# Patient Record
Sex: FEMALE | Race: WHITE | Employment: OTHER | URBAN - METROPOLITAN AREA
[De-identification: names, ages, dates, MRNs, and addresses within clinical notes are randomized per-mention and may not be internally consistent; named-entity substitution may affect disease eponyms.]

---

## 2017-09-07 ENCOUNTER — HOSPITAL ENCOUNTER (OUTPATIENT)
Dept: PREADMISSION TESTING | Age: 82
Discharge: HOME | End: 2017-09-07
Payer: MEDICARE

## 2017-09-07 VITALS
TEMPERATURE: 97.8 F | HEIGHT: 63 IN | WEIGHT: 157.6 LBS | RESPIRATION RATE: 16 BRPM | HEART RATE: 85 BPM | DIASTOLIC BLOOD PRESSURE: 96 MMHG | SYSTOLIC BLOOD PRESSURE: 176 MMHG | OXYGEN SATURATION: 94 % | BODY MASS INDEX: 27.93 KG/M2

## 2017-09-07 DIAGNOSIS — M17.11 PRIMARY OSTEOARTHRITIS OF RIGHT KNEE: Chronic | ICD-10-CM

## 2017-09-07 DIAGNOSIS — Z96.651 STATUS POST TOTAL KNEE REPLACEMENT, RIGHT: Primary | ICD-10-CM

## 2017-09-07 DIAGNOSIS — H35.30 MACULAR DEGENERATION: Chronic | ICD-10-CM

## 2017-09-07 DIAGNOSIS — I10 ESSENTIAL HYPERTENSION: Chronic | ICD-10-CM

## 2017-09-07 PROBLEM — E78.5 HYPERLIPIDEMIA: Status: ACTIVE | Noted: 2017-09-07

## 2017-09-07 LAB
ABO/RH: NORMAL
ANION GAP SERPL CALCULATED.3IONS-SCNC: 20 MEQ/L (ref 7–13)
ANTIBODY SCREEN: NORMAL
BACTERIA: ABNORMAL /HPF
BILIRUBIN URINE: NEGATIVE
BLOOD, URINE: ABNORMAL
BUN BLDV-MCNC: 24 MG/DL (ref 8–23)
CALCIUM SERPL-MCNC: 9.8 MG/DL (ref 8.6–10.2)
CHLORIDE BLD-SCNC: 97 MEQ/L (ref 98–107)
CLARITY: CLEAR
CO2: 25 MEQ/L (ref 22–29)
COLOR: YELLOW
CREAT SERPL-MCNC: 0.55 MG/DL (ref 0.5–0.9)
EPITHELIAL CELLS, UA: ABNORMAL /HPF
GFR AFRICAN AMERICAN: >60
GFR NON-AFRICAN AMERICAN: >60
GLUCOSE BLD-MCNC: 142 MG/DL (ref 74–109)
GLUCOSE URINE: NEGATIVE MG/DL
HCT VFR BLD CALC: 46.7 % (ref 37–47)
HEMOGLOBIN: 16 G/DL (ref 12–16)
KETONES, URINE: NEGATIVE MG/DL
LEUKOCYTE ESTERASE, URINE: ABNORMAL
MCH RBC QN AUTO: 29.8 PG (ref 27–31.3)
MCHC RBC AUTO-ENTMCNC: 34.3 % (ref 33–37)
MCV RBC AUTO: 86.7 FL (ref 82–100)
NITRITE, URINE: NEGATIVE
PDW BLD-RTO: 13.5 % (ref 11.5–14.5)
PH UA: 6.5 (ref 5–9)
PLATELET # BLD: 231 K/UL (ref 130–400)
POTASSIUM SERPL-SCNC: 4 MEQ/L (ref 3.5–5.1)
PROTEIN UA: NEGATIVE MG/DL
RBC # BLD: 5.39 M/UL (ref 4.2–5.4)
RBC UA: ABNORMAL /HPF (ref 0–2)
SODIUM BLD-SCNC: 142 MEQ/L (ref 132–144)
SPECIFIC GRAVITY UA: 1.02 (ref 1–1.03)
URINE REFLEX TO CULTURE: YES
UROBILINOGEN, URINE: 0.2 E.U./DL
WBC # BLD: 8.6 K/UL (ref 4.8–10.8)
WBC UA: ABNORMAL /HPF (ref 0–5)
YEAST: PRESENT

## 2017-09-07 PROCEDURE — 86850 RBC ANTIBODY SCREEN: CPT

## 2017-09-07 PROCEDURE — 80048 BASIC METABOLIC PNL TOTAL CA: CPT

## 2017-09-07 PROCEDURE — 86900 BLOOD TYPING SEROLOGIC ABO: CPT

## 2017-09-07 PROCEDURE — 81001 URINALYSIS AUTO W/SCOPE: CPT

## 2017-09-07 PROCEDURE — 85027 COMPLETE CBC AUTOMATED: CPT

## 2017-09-07 PROCEDURE — 87086 URINE CULTURE/COLONY COUNT: CPT

## 2017-09-07 PROCEDURE — 86901 BLOOD TYPING SEROLOGIC RH(D): CPT

## 2017-09-07 RX ORDER — SAW/VIT E/SOD SEL/LYC/BETA/PYG 160-100
TABLET ORAL
COMMUNITY

## 2017-09-07 RX ORDER — SODIUM CHLORIDE 0.9 % (FLUSH) 0.9 %
10 SYRINGE (ML) INJECTION EVERY 12 HOURS SCHEDULED
Status: CANCELLED | OUTPATIENT
Start: 2017-09-07

## 2017-09-07 RX ORDER — SODIUM CHLORIDE, SODIUM LACTATE, POTASSIUM CHLORIDE, CALCIUM CHLORIDE 600; 310; 30; 20 MG/100ML; MG/100ML; MG/100ML; MG/100ML
INJECTION, SOLUTION INTRAVENOUS CONTINUOUS
Status: CANCELLED | OUTPATIENT
Start: 2017-09-07

## 2017-09-07 RX ORDER — SODIUM CHLORIDE 0.9 % (FLUSH) 0.9 %
10 SYRINGE (ML) INJECTION PRN
Status: CANCELLED | OUTPATIENT
Start: 2017-09-07

## 2017-09-07 RX ORDER — LIDOCAINE HYDROCHLORIDE 10 MG/ML
1 INJECTION, SOLUTION EPIDURAL; INFILTRATION; INTRACAUDAL; PERINEURAL
Status: CANCELLED | OUTPATIENT
Start: 2017-09-07 | End: 2017-09-07

## 2017-09-09 LAB — URINE CULTURE, ROUTINE: NORMAL

## 2017-09-14 ENCOUNTER — ANESTHESIA EVENT (OUTPATIENT)
Dept: OPERATING ROOM | Age: 82
DRG: 470 | End: 2017-09-14
Payer: MEDICARE

## 2017-09-15 ENCOUNTER — APPOINTMENT (OUTPATIENT)
Dept: GENERAL RADIOLOGY | Age: 82
DRG: 470 | End: 2017-09-15
Attending: ORTHOPAEDIC SURGERY
Payer: MEDICARE

## 2017-09-15 ENCOUNTER — HOSPITAL ENCOUNTER (INPATIENT)
Age: 82
LOS: 4 days | Discharge: HOME HEALTH | DRG: 470 | End: 2017-09-19
Attending: ORTHOPAEDIC SURGERY | Admitting: ORTHOPAEDIC SURGERY
Payer: MEDICARE

## 2017-09-15 ENCOUNTER — ANESTHESIA (OUTPATIENT)
Dept: OPERATING ROOM | Age: 82
DRG: 470 | End: 2017-09-15
Payer: MEDICARE

## 2017-09-15 VITALS — OXYGEN SATURATION: 97 % | DIASTOLIC BLOOD PRESSURE: 56 MMHG | TEMPERATURE: 95.9 F | SYSTOLIC BLOOD PRESSURE: 104 MMHG

## 2017-09-15 DIAGNOSIS — Z96.651 STATUS POST TOTAL KNEE REPLACEMENT, RIGHT: Primary | ICD-10-CM

## 2017-09-15 DIAGNOSIS — M51.37 DEGENERATION OF INTERVERTEBRAL DISC OF LUMBOSACRAL REGION: ICD-10-CM

## 2017-09-15 DIAGNOSIS — M17.11 PRIMARY OSTEOARTHRITIS OF RIGHT KNEE: ICD-10-CM

## 2017-09-15 PROCEDURE — 2500000003 HC RX 250 WO HCPCS: Performed by: ORTHOPAEDIC SURGERY

## 2017-09-15 PROCEDURE — 2580000003 HC RX 258: Performed by: ORTHOPAEDIC SURGERY

## 2017-09-15 PROCEDURE — C1729 CATH, DRAINAGE: HCPCS | Performed by: ORTHOPAEDIC SURGERY

## 2017-09-15 PROCEDURE — 6360000002 HC RX W HCPCS: Performed by: ORTHOPAEDIC SURGERY

## 2017-09-15 PROCEDURE — 2500000003 HC RX 250 WO HCPCS: Performed by: NURSE ANESTHETIST, CERTIFIED REGISTERED

## 2017-09-15 PROCEDURE — 3700000001 HC ADD 15 MINUTES (ANESTHESIA): Performed by: ORTHOPAEDIC SURGERY

## 2017-09-15 PROCEDURE — 6370000000 HC RX 637 (ALT 250 FOR IP): Performed by: INTERNAL MEDICINE

## 2017-09-15 PROCEDURE — 1210000000 HC MED SURG R&B

## 2017-09-15 PROCEDURE — 3600000014 HC SURGERY LEVEL 4 ADDTL 15MIN: Performed by: ORTHOPAEDIC SURGERY

## 2017-09-15 PROCEDURE — 6360000002 HC RX W HCPCS: Performed by: NURSE ANESTHETIST, CERTIFIED REGISTERED

## 2017-09-15 PROCEDURE — 3600000004 HC SURGERY LEVEL 4 BASE: Performed by: ORTHOPAEDIC SURGERY

## 2017-09-15 PROCEDURE — 3700000000 HC ANESTHESIA ATTENDED CARE: Performed by: ORTHOPAEDIC SURGERY

## 2017-09-15 PROCEDURE — 6360000002 HC RX W HCPCS: Performed by: ANESTHESIOLOGY

## 2017-09-15 PROCEDURE — 7100000000 HC PACU RECOVERY - FIRST 15 MIN: Performed by: ORTHOPAEDIC SURGERY

## 2017-09-15 PROCEDURE — 2580000003 HC RX 258: Performed by: STUDENT IN AN ORGANIZED HEALTH CARE EDUCATION/TRAINING PROGRAM

## 2017-09-15 PROCEDURE — 7100000001 HC PACU RECOVERY - ADDTL 15 MIN: Performed by: ORTHOPAEDIC SURGERY

## 2017-09-15 PROCEDURE — G8988 SELF CARE GOAL STATUS: HCPCS

## 2017-09-15 PROCEDURE — 97166 OT EVAL MOD COMPLEX 45 MIN: CPT

## 2017-09-15 PROCEDURE — G8979 MOBILITY GOAL STATUS: HCPCS

## 2017-09-15 PROCEDURE — 6370000000 HC RX 637 (ALT 250 FOR IP): Performed by: ORTHOPAEDIC SURGERY

## 2017-09-15 PROCEDURE — C1713 ANCHOR/SCREW BN/BN,TIS/BN: HCPCS | Performed by: ORTHOPAEDIC SURGERY

## 2017-09-15 PROCEDURE — 2500000003 HC RX 250 WO HCPCS: Performed by: STUDENT IN AN ORGANIZED HEALTH CARE EDUCATION/TRAINING PROGRAM

## 2017-09-15 PROCEDURE — G8987 SELF CARE CURRENT STATUS: HCPCS

## 2017-09-15 PROCEDURE — A6199 ALGINATE DRSG WOUND FILLER: HCPCS | Performed by: ORTHOPAEDIC SURGERY

## 2017-09-15 PROCEDURE — 97162 PT EVAL MOD COMPLEX 30 MIN: CPT

## 2017-09-15 PROCEDURE — C1776 JOINT DEVICE (IMPLANTABLE): HCPCS | Performed by: ORTHOPAEDIC SURGERY

## 2017-09-15 PROCEDURE — 73560 X-RAY EXAM OF KNEE 1 OR 2: CPT

## 2017-09-15 PROCEDURE — 64447 NJX AA&/STRD FEMORAL NRV IMG: CPT | Performed by: ANESTHESIOLOGY

## 2017-09-15 PROCEDURE — G8978 MOBILITY CURRENT STATUS: HCPCS

## 2017-09-15 DEVICE — COMPONENT FEM SZ 6 STD R KNEE CO CHROM CEM POST STBL COR: Type: IMPLANTABLE DEVICE | Site: KNEE | Status: FUNCTIONAL

## 2017-09-15 DEVICE — COMPONENT PAT DIA32MM THK8.5MM KNEE POLY CEM CONVENTIONAL: Type: IMPLANTABLE DEVICE | Site: KNEE | Status: FUNCTIONAL

## 2017-09-15 DEVICE — COMPONENT ARTC SURF PS 6-9 EF 12 MM RT TIB KNEE POLYETH: Type: IMPLANTABLE DEVICE | Site: KNEE | Status: FUNCTIONAL

## 2017-09-15 DEVICE — PSN TIB STM 5 DEG SZ E R: Type: IMPLANTABLE DEVICE | Site: KNEE | Status: FUNCTIONAL

## 2017-09-15 DEVICE — CEMENT BNE 20ML 40GM ACRYL RESIN HI VISC RADPQ FAST SET: Type: IMPLANTABLE DEVICE | Site: KNEE | Status: FUNCTIONAL

## 2017-09-15 RX ORDER — OXYCODONE HYDROCHLORIDE AND ACETAMINOPHEN 5; 325 MG/1; MG/1
1 TABLET ORAL EVERY 4 HOURS PRN
Status: DISCONTINUED | OUTPATIENT
Start: 2017-09-15 | End: 2017-09-16

## 2017-09-15 RX ORDER — AMLODIPINE BESYLATE 5 MG/1
5 TABLET ORAL DAILY
Status: DISCONTINUED | OUTPATIENT
Start: 2017-09-16 | End: 2017-09-19 | Stop reason: HOSPADM

## 2017-09-15 RX ORDER — METOPROLOL SUCCINATE 50 MG/1
50 TABLET, EXTENDED RELEASE ORAL DAILY
Status: DISCONTINUED | OUTPATIENT
Start: 2017-09-15 | End: 2017-09-19 | Stop reason: HOSPADM

## 2017-09-15 RX ORDER — FENTANYL CITRATE 50 UG/ML
INJECTION, SOLUTION INTRAMUSCULAR; INTRAVENOUS
Status: DISCONTINUED
Start: 2017-09-15 | End: 2017-09-15

## 2017-09-15 RX ORDER — ONDANSETRON 2 MG/ML
INJECTION INTRAMUSCULAR; INTRAVENOUS PRN
Status: DISCONTINUED | OUTPATIENT
Start: 2017-09-15 | End: 2017-09-15 | Stop reason: SDUPTHER

## 2017-09-15 RX ORDER — OXYCODONE HYDROCHLORIDE AND ACETAMINOPHEN 5; 325 MG/1; MG/1
2 TABLET ORAL EVERY 4 HOURS PRN
Status: DISCONTINUED | OUTPATIENT
Start: 2017-09-15 | End: 2017-09-16

## 2017-09-15 RX ORDER — ACETAMINOPHEN 325 MG/1
325 TABLET ORAL EVERY 4 HOURS PRN
Status: DISCONTINUED | OUTPATIENT
Start: 2017-09-15 | End: 2017-09-15 | Stop reason: HOSPADM

## 2017-09-15 RX ORDER — PRAVASTATIN SODIUM 40 MG
40 TABLET ORAL NIGHTLY
Status: DISCONTINUED | OUTPATIENT
Start: 2017-09-15 | End: 2017-09-19 | Stop reason: HOSPADM

## 2017-09-15 RX ORDER — SODIUM CHLORIDE 0.9 % (FLUSH) 0.9 %
10 SYRINGE (ML) INJECTION EVERY 12 HOURS SCHEDULED
Status: DISCONTINUED | OUTPATIENT
Start: 2017-09-15 | End: 2017-09-15 | Stop reason: HOSPADM

## 2017-09-15 RX ORDER — BISACODYL 10 MG
10 SUPPOSITORY, RECTAL RECTAL DAILY PRN
Status: DISCONTINUED | OUTPATIENT
Start: 2017-09-15 | End: 2017-09-19 | Stop reason: HOSPADM

## 2017-09-15 RX ORDER — ROPIVACAINE HYDROCHLORIDE 5 MG/ML
INJECTION, SOLUTION EPIDURAL; INFILTRATION; PERINEURAL PRN
Status: DISCONTINUED | OUTPATIENT
Start: 2017-09-15 | End: 2017-09-15 | Stop reason: SDUPTHER

## 2017-09-15 RX ORDER — MIDAZOLAM HYDROCHLORIDE 1 MG/ML
INJECTION INTRAMUSCULAR; INTRAVENOUS PRN
Status: DISCONTINUED | OUTPATIENT
Start: 2017-09-15 | End: 2017-09-15 | Stop reason: SDUPTHER

## 2017-09-15 RX ORDER — SODIUM CHLORIDE, SODIUM LACTATE, POTASSIUM CHLORIDE, CALCIUM CHLORIDE 600; 310; 30; 20 MG/100ML; MG/100ML; MG/100ML; MG/100ML
INJECTION, SOLUTION INTRAVENOUS CONTINUOUS
Status: DISCONTINUED | OUTPATIENT
Start: 2017-09-15 | End: 2017-09-15

## 2017-09-15 RX ORDER — HYDROCODONE BITARTRATE AND ACETAMINOPHEN 5; 325 MG/1; MG/1
1 TABLET ORAL PRN
Status: DISCONTINUED | OUTPATIENT
Start: 2017-09-15 | End: 2017-09-15 | Stop reason: HOSPADM

## 2017-09-15 RX ORDER — GLYCOPYRROLATE 0.2 MG/ML
INJECTION INTRAMUSCULAR; INTRAVENOUS PRN
Status: DISCONTINUED | OUTPATIENT
Start: 2017-09-15 | End: 2017-09-15 | Stop reason: SDUPTHER

## 2017-09-15 RX ORDER — MORPHINE SULFATE 4 MG/ML
4 INJECTION, SOLUTION INTRAMUSCULAR; INTRAVENOUS
Status: DISCONTINUED | OUTPATIENT
Start: 2017-09-15 | End: 2017-09-16

## 2017-09-15 RX ORDER — SODIUM CHLORIDE 0.9 % (FLUSH) 0.9 %
10 SYRINGE (ML) INJECTION EVERY 12 HOURS SCHEDULED
Status: DISCONTINUED | OUTPATIENT
Start: 2017-09-15 | End: 2017-09-19 | Stop reason: HOSPADM

## 2017-09-15 RX ORDER — LIDOCAINE HYDROCHLORIDE 10 MG/ML
1 INJECTION, SOLUTION EPIDURAL; INFILTRATION; INTRACAUDAL; PERINEURAL
Status: COMPLETED | OUTPATIENT
Start: 2017-09-15 | End: 2017-09-15

## 2017-09-15 RX ORDER — MIDAZOLAM HYDROCHLORIDE 1 MG/ML
INJECTION INTRAMUSCULAR; INTRAVENOUS
Status: DISCONTINUED
Start: 2017-09-15 | End: 2017-09-15

## 2017-09-15 RX ORDER — KETOROLAC TROMETHAMINE 15 MG/ML
15 INJECTION, SOLUTION INTRAMUSCULAR; INTRAVENOUS EVERY 6 HOURS
Status: DISCONTINUED | OUTPATIENT
Start: 2017-09-15 | End: 2017-09-15 | Stop reason: HOSPADM

## 2017-09-15 RX ORDER — SODIUM CHLORIDE 0.9 % (FLUSH) 0.9 %
10 SYRINGE (ML) INJECTION PRN
Status: DISCONTINUED | OUTPATIENT
Start: 2017-09-15 | End: 2017-09-15 | Stop reason: HOSPADM

## 2017-09-15 RX ORDER — HYDROCODONE BITARTRATE AND ACETAMINOPHEN 5; 325 MG/1; MG/1
2 TABLET ORAL PRN
Status: DISCONTINUED | OUTPATIENT
Start: 2017-09-15 | End: 2017-09-15 | Stop reason: HOSPADM

## 2017-09-15 RX ORDER — MAGNESIUM HYDROXIDE 1200 MG/15ML
LIQUID ORAL CONTINUOUS PRN
Status: DISCONTINUED | OUTPATIENT
Start: 2017-09-15 | End: 2017-09-15 | Stop reason: HOSPADM

## 2017-09-15 RX ORDER — FENTANYL CITRATE 50 UG/ML
50 INJECTION, SOLUTION INTRAMUSCULAR; INTRAVENOUS EVERY 10 MIN PRN
Status: DISCONTINUED | OUTPATIENT
Start: 2017-09-15 | End: 2017-09-15 | Stop reason: HOSPADM

## 2017-09-15 RX ORDER — DEXAMETHASONE SODIUM PHOSPHATE 4 MG/ML
INJECTION, SOLUTION INTRA-ARTICULAR; INTRALESIONAL; INTRAMUSCULAR; INTRAVENOUS; SOFT TISSUE PRN
Status: DISCONTINUED | OUTPATIENT
Start: 2017-09-15 | End: 2017-09-15 | Stop reason: SDUPTHER

## 2017-09-15 RX ORDER — FENTANYL CITRATE 50 UG/ML
INJECTION, SOLUTION INTRAMUSCULAR; INTRAVENOUS PRN
Status: DISCONTINUED | OUTPATIENT
Start: 2017-09-15 | End: 2017-09-15 | Stop reason: SDUPTHER

## 2017-09-15 RX ORDER — MORPHINE SULFATE 2 MG/ML
2 INJECTION, SOLUTION INTRAMUSCULAR; INTRAVENOUS
Status: DISCONTINUED | OUTPATIENT
Start: 2017-09-15 | End: 2017-09-16

## 2017-09-15 RX ORDER — DIPHENHYDRAMINE HYDROCHLORIDE 50 MG/ML
12.5 INJECTION INTRAMUSCULAR; INTRAVENOUS
Status: DISCONTINUED | OUTPATIENT
Start: 2017-09-15 | End: 2017-09-15 | Stop reason: HOSPADM

## 2017-09-15 RX ORDER — SENNA AND DOCUSATE SODIUM 50; 8.6 MG/1; MG/1
1 TABLET, FILM COATED ORAL DAILY
Status: DISCONTINUED | OUTPATIENT
Start: 2017-09-15 | End: 2017-09-19 | Stop reason: HOSPADM

## 2017-09-15 RX ORDER — LIDOCAINE HYDROCHLORIDE 10 MG/ML
1 INJECTION, SOLUTION EPIDURAL; INFILTRATION; INTRACAUDAL; PERINEURAL
Status: DISCONTINUED | OUTPATIENT
Start: 2017-09-15 | End: 2017-09-15 | Stop reason: HOSPADM

## 2017-09-15 RX ORDER — NALOXONE HYDROCHLORIDE 0.4 MG/ML
0.4 INJECTION, SOLUTION INTRAMUSCULAR; INTRAVENOUS; SUBCUTANEOUS PRN
Status: DISCONTINUED | OUTPATIENT
Start: 2017-09-15 | End: 2017-09-19 | Stop reason: HOSPADM

## 2017-09-15 RX ORDER — MEPERIDINE HYDROCHLORIDE 25 MG/ML
12.5 INJECTION INTRAMUSCULAR; INTRAVENOUS; SUBCUTANEOUS EVERY 5 MIN PRN
Status: DISCONTINUED | OUTPATIENT
Start: 2017-09-15 | End: 2017-09-15 | Stop reason: HOSPADM

## 2017-09-15 RX ORDER — CHLORTHALIDONE 25 MG/1
25 TABLET ORAL DAILY
Status: DISCONTINUED | OUTPATIENT
Start: 2017-09-16 | End: 2017-09-19 | Stop reason: HOSPADM

## 2017-09-15 RX ORDER — DOCUSATE SODIUM 100 MG/1
100 CAPSULE, LIQUID FILLED ORAL 2 TIMES DAILY
Status: DISCONTINUED | OUTPATIENT
Start: 2017-09-15 | End: 2017-09-19 | Stop reason: HOSPADM

## 2017-09-15 RX ORDER — ASPIRIN 81 MG/1
81 TABLET ORAL 2 TIMES DAILY
Status: DISCONTINUED | OUTPATIENT
Start: 2017-09-16 | End: 2017-09-19 | Stop reason: HOSPADM

## 2017-09-15 RX ORDER — METOCLOPRAMIDE HYDROCHLORIDE 5 MG/ML
10 INJECTION INTRAMUSCULAR; INTRAVENOUS
Status: DISCONTINUED | OUTPATIENT
Start: 2017-09-15 | End: 2017-09-15 | Stop reason: HOSPADM

## 2017-09-15 RX ORDER — ONDANSETRON 2 MG/ML
4 INJECTION INTRAMUSCULAR; INTRAVENOUS EVERY 6 HOURS PRN
Status: DISCONTINUED | OUTPATIENT
Start: 2017-09-15 | End: 2017-09-19 | Stop reason: HOSPADM

## 2017-09-15 RX ORDER — SODIUM CHLORIDE 9 MG/ML
INJECTION, SOLUTION INTRAVENOUS CONTINUOUS
Status: DISCONTINUED | OUTPATIENT
Start: 2017-09-15 | End: 2017-09-19 | Stop reason: HOSPADM

## 2017-09-15 RX ORDER — KETOROLAC TROMETHAMINE 15 MG/ML
15 INJECTION, SOLUTION INTRAMUSCULAR; INTRAVENOUS EVERY 6 HOURS
Status: COMPLETED | OUTPATIENT
Start: 2017-09-15 | End: 2017-09-16

## 2017-09-15 RX ORDER — PROPOFOL 10 MG/ML
INJECTION, EMULSION INTRAVENOUS PRN
Status: DISCONTINUED | OUTPATIENT
Start: 2017-09-15 | End: 2017-09-15 | Stop reason: SDUPTHER

## 2017-09-15 RX ORDER — SODIUM CHLORIDE 0.9 % (FLUSH) 0.9 %
10 SYRINGE (ML) INJECTION PRN
Status: DISCONTINUED | OUTPATIENT
Start: 2017-09-15 | End: 2017-09-19 | Stop reason: HOSPADM

## 2017-09-15 RX ORDER — MORPHINE SULFATE 1 MG/ML
INJECTION, SOLUTION EPIDURAL; INTRATHECAL; INTRAVENOUS PRN
Status: DISCONTINUED | OUTPATIENT
Start: 2017-09-15 | End: 2017-09-15 | Stop reason: SDUPTHER

## 2017-09-15 RX ORDER — ONDANSETRON 2 MG/ML
4 INJECTION INTRAMUSCULAR; INTRAVENOUS
Status: DISCONTINUED | OUTPATIENT
Start: 2017-09-15 | End: 2017-09-15 | Stop reason: HOSPADM

## 2017-09-15 RX ORDER — PROPOFOL 10 MG/ML
INJECTION, EMULSION INTRAVENOUS CONTINUOUS PRN
Status: DISCONTINUED | OUTPATIENT
Start: 2017-09-15 | End: 2017-09-15 | Stop reason: SDUPTHER

## 2017-09-15 RX ORDER — ACETAMINOPHEN 325 MG/1
650 TABLET ORAL EVERY 4 HOURS PRN
Status: DISCONTINUED | OUTPATIENT
Start: 2017-09-15 | End: 2017-09-19 | Stop reason: HOSPADM

## 2017-09-15 RX ADMIN — PHENYLEPHRINE HYDROCHLORIDE 50 MCG: 10 INJECTION INTRAVENOUS at 10:43

## 2017-09-15 RX ADMIN — PROPOFOL 120 MCG/KG/MIN: 10 INJECTION, EMULSION INTRAVENOUS at 10:00

## 2017-09-15 RX ADMIN — PHENYLEPHRINE HYDROCHLORIDE 50 MCG: 10 INJECTION INTRAVENOUS at 11:00

## 2017-09-15 RX ADMIN — DOCUSATE SODIUM 100 MG: 100 CAPSULE, LIQUID FILLED ORAL at 21:43

## 2017-09-15 RX ADMIN — CEFAZOLIN SODIUM 2 G: 2 SOLUTION INTRAVENOUS at 18:32

## 2017-09-15 RX ADMIN — MORPHINE SULFATE 0.2 MG: 1 INJECTION EPIDURAL; INTRATHECAL; INTRAVENOUS at 09:21

## 2017-09-15 RX ADMIN — PHENYLEPHRINE HYDROCHLORIDE 50 MCG: 10 INJECTION INTRAVENOUS at 10:01

## 2017-09-15 RX ADMIN — SENNOSIDES AND DOCUSATE SODIUM 1 TABLET: 8.6; 5 TABLET ORAL at 21:42

## 2017-09-15 RX ADMIN — PRAVASTATIN SODIUM 40 MG: 40 TABLET ORAL at 21:43

## 2017-09-15 RX ADMIN — FENTANYL CITRATE 100 MCG: 50 INJECTION, SOLUTION INTRAMUSCULAR; INTRAVENOUS at 09:10

## 2017-09-15 RX ADMIN — PROPOFOL 10 MG: 10 INJECTION, EMULSION INTRAVENOUS at 10:00

## 2017-09-15 RX ADMIN — GLYCOPYRROLATE 0.2 MG: 0.2 INJECTION INTRAMUSCULAR; INTRAVENOUS at 10:12

## 2017-09-15 RX ADMIN — OXYCODONE HYDROCHLORIDE AND ACETAMINOPHEN 1 TABLET: 5; 325 TABLET ORAL at 18:32

## 2017-09-15 RX ADMIN — ROPIVACAINE HYDROCHLORIDE 30 ML: 5 INJECTION, SOLUTION EPIDURAL; INFILTRATION; PERINEURAL at 09:15

## 2017-09-15 RX ADMIN — PHENYLEPHRINE HYDROCHLORIDE 50 MCG: 10 INJECTION INTRAVENOUS at 10:35

## 2017-09-15 RX ADMIN — PHENYLEPHRINE HYDROCHLORIDE 50 MCG: 10 INJECTION INTRAVENOUS at 10:26

## 2017-09-15 RX ADMIN — OXYCODONE HYDROCHLORIDE AND ACETAMINOPHEN 2 TABLET: 5; 325 TABLET ORAL at 22:36

## 2017-09-15 RX ADMIN — OXYCODONE HYDROCHLORIDE AND ACETAMINOPHEN 1 TABLET: 5; 325 TABLET ORAL at 14:32

## 2017-09-15 RX ADMIN — PHENYLEPHRINE HYDROCHLORIDE 50 MCG: 10 INJECTION INTRAVENOUS at 10:47

## 2017-09-15 RX ADMIN — PHENYLEPHRINE HYDROCHLORIDE 50 MCG: 10 INJECTION INTRAVENOUS at 11:12

## 2017-09-15 RX ADMIN — SODIUM CHLORIDE, POTASSIUM CHLORIDE, SODIUM LACTATE AND CALCIUM CHLORIDE: 600; 310; 30; 20 INJECTION, SOLUTION INTRAVENOUS at 08:36

## 2017-09-15 RX ADMIN — ONDANSETRON 4 MG: 2 INJECTION INTRAMUSCULAR; INTRAVENOUS at 10:05

## 2017-09-15 RX ADMIN — PHENYLEPHRINE HYDROCHLORIDE 50 MCG: 10 INJECTION INTRAVENOUS at 11:06

## 2017-09-15 RX ADMIN — PHENYLEPHRINE HYDROCHLORIDE 50 MCG: 10 INJECTION INTRAVENOUS at 10:13

## 2017-09-15 RX ADMIN — SODIUM CHLORIDE: 9 INJECTION, SOLUTION INTRAVENOUS at 14:32

## 2017-09-15 RX ADMIN — CEFAZOLIN SODIUM 2 G: 2 SOLUTION INTRAVENOUS at 09:53

## 2017-09-15 RX ADMIN — LIDOCAINE HYDROCHLORIDE 0.5 ML: 10 INJECTION, SOLUTION EPIDURAL; INFILTRATION; INTRACAUDAL; PERINEURAL at 08:36

## 2017-09-15 RX ADMIN — DEXAMETHASONE SODIUM PHOSPHATE 4 MG: 4 INJECTION INTRA-ARTICULAR; INTRALESIONAL; INTRAMUSCULAR; INTRAVENOUS; SOFT TISSUE at 10:04

## 2017-09-15 RX ADMIN — PHENYLEPHRINE HYDROCHLORIDE 50 MCG: 10 INJECTION INTRAVENOUS at 10:40

## 2017-09-15 RX ADMIN — PHENYLEPHRINE HYDROCHLORIDE 50 MCG: 10 INJECTION INTRAVENOUS at 10:55

## 2017-09-15 RX ADMIN — PHENYLEPHRINE HYDROCHLORIDE 50 MCG: 10 INJECTION INTRAVENOUS at 10:22

## 2017-09-15 RX ADMIN — KETOROLAC TROMETHAMINE 15 MG: 15 INJECTION, SOLUTION INTRAMUSCULAR; INTRAVENOUS at 18:33

## 2017-09-15 RX ADMIN — PHENYLEPHRINE HYDROCHLORIDE 50 MCG: 10 INJECTION INTRAVENOUS at 10:30

## 2017-09-15 RX ADMIN — SODIUM CHLORIDE, PRESERVATIVE FREE 10 ML: 5 INJECTION INTRAVENOUS at 21:43

## 2017-09-15 RX ADMIN — MIDAZOLAM HYDROCHLORIDE 2 MG: 1 INJECTION, SOLUTION INTRAMUSCULAR; INTRAVENOUS at 09:10

## 2017-09-15 RX ADMIN — PHENYLEPHRINE HYDROCHLORIDE 50 MCG: 10 INJECTION INTRAVENOUS at 10:18

## 2017-09-15 RX ADMIN — PHENYLEPHRINE HYDROCHLORIDE 50 MCG: 10 INJECTION INTRAVENOUS at 10:24

## 2017-09-15 RX ADMIN — SODIUM CHLORIDE, POTASSIUM CHLORIDE, SODIUM LACTATE AND CALCIUM CHLORIDE: 600; 310; 30; 20 INJECTION, SOLUTION INTRAVENOUS at 10:13

## 2017-09-15 ASSESSMENT — PAIN SCALES - GENERAL
PAINLEVEL_OUTOF10: 7
PAINLEVEL_OUTOF10: 4
PAINLEVEL_OUTOF10: 7

## 2017-09-16 ENCOUNTER — APPOINTMENT (OUTPATIENT)
Dept: CT IMAGING | Age: 82
DRG: 470 | End: 2017-09-16
Attending: ORTHOPAEDIC SURGERY
Payer: MEDICARE

## 2017-09-16 LAB
ANION GAP SERPL CALCULATED.3IONS-SCNC: 14 MEQ/L (ref 7–13)
BUN BLDV-MCNC: 30 MG/DL (ref 8–23)
CALCIUM SERPL-MCNC: 8.7 MG/DL (ref 8.6–10.2)
CHLORIDE BLD-SCNC: 98 MEQ/L (ref 98–107)
CO2: 25 MEQ/L (ref 22–29)
CREAT SERPL-MCNC: 0.65 MG/DL (ref 0.5–0.9)
GFR AFRICAN AMERICAN: >60
GFR NON-AFRICAN AMERICAN: >60
GLUCOSE BLD-MCNC: 106 MG/DL (ref 74–109)
HCT VFR BLD CALC: 36.3 % (ref 37–47)
HEMOGLOBIN: 12.3 G/DL (ref 12–16)
MCH RBC QN AUTO: 30.2 PG (ref 27–31.3)
MCHC RBC AUTO-ENTMCNC: 34 % (ref 33–37)
MCV RBC AUTO: 88.7 FL (ref 82–100)
PDW BLD-RTO: 13.7 % (ref 11.5–14.5)
PLATELET # BLD: 196 K/UL (ref 130–400)
POTASSIUM SERPL-SCNC: 3.8 MEQ/L (ref 3.5–5.1)
RBC # BLD: 4.09 M/UL (ref 4.2–5.4)
SODIUM BLD-SCNC: 137 MEQ/L (ref 132–144)
WBC # BLD: 10.3 K/UL (ref 4.8–10.8)

## 2017-09-16 PROCEDURE — 71275 CT ANGIOGRAPHY CHEST: CPT

## 2017-09-16 PROCEDURE — 2500000003 HC RX 250 WO HCPCS: Performed by: NURSE PRACTITIONER

## 2017-09-16 PROCEDURE — S0028 INJECTION, FAMOTIDINE, 20 MG: HCPCS | Performed by: NURSE PRACTITIONER

## 2017-09-16 PROCEDURE — 6370000000 HC RX 637 (ALT 250 FOR IP): Performed by: INTERNAL MEDICINE

## 2017-09-16 PROCEDURE — 6370000000 HC RX 637 (ALT 250 FOR IP): Performed by: ORTHOPAEDIC SURGERY

## 2017-09-16 PROCEDURE — 2700000000 HC OXYGEN THERAPY PER DAY

## 2017-09-16 PROCEDURE — 85027 COMPLETE CBC AUTOMATED: CPT

## 2017-09-16 PROCEDURE — 0SRC0J9 REPLACEMENT OF RIGHT KNEE JOINT WITH SYNTHETIC SUBSTITUTE, CEMENTED, OPEN APPROACH: ICD-10-PCS | Performed by: ORTHOPAEDIC SURGERY

## 2017-09-16 PROCEDURE — 6370000000 HC RX 637 (ALT 250 FOR IP): Performed by: NURSE PRACTITIONER

## 2017-09-16 PROCEDURE — 93005 ELECTROCARDIOGRAM TRACING: CPT

## 2017-09-16 PROCEDURE — 80048 BASIC METABOLIC PNL TOTAL CA: CPT

## 2017-09-16 PROCEDURE — 36415 COLL VENOUS BLD VENIPUNCTURE: CPT

## 2017-09-16 PROCEDURE — 2580000003 HC RX 258: Performed by: ORTHOPAEDIC SURGERY

## 2017-09-16 PROCEDURE — 6360000004 HC RX CONTRAST MEDICATION: Performed by: RADIOLOGY

## 2017-09-16 PROCEDURE — 3E0T3BZ INTRODUCTION OF ANESTHETIC AGENT INTO PERIPHERAL NERVES AND PLEXI, PERCUTANEOUS APPROACH: ICD-10-PCS | Performed by: ANESTHESIOLOGY

## 2017-09-16 PROCEDURE — 1210000000 HC MED SURG R&B

## 2017-09-16 PROCEDURE — 6360000002 HC RX W HCPCS: Performed by: INTERNAL MEDICINE

## 2017-09-16 PROCEDURE — 6360000002 HC RX W HCPCS: Performed by: ORTHOPAEDIC SURGERY

## 2017-09-16 RX ORDER — HYDROCODONE BITARTRATE AND ACETAMINOPHEN 5; 325 MG/1; MG/1
1 TABLET ORAL EVERY 4 HOURS PRN
Status: DISCONTINUED | OUTPATIENT
Start: 2017-09-16 | End: 2017-09-16

## 2017-09-16 RX ORDER — HYDROCODONE BITARTRATE AND ACETAMINOPHEN 5; 325 MG/1; MG/1
2 TABLET ORAL EVERY 4 HOURS PRN
Status: DISCONTINUED | OUTPATIENT
Start: 2017-09-16 | End: 2017-09-16

## 2017-09-16 RX ORDER — HYDROCODONE BITARTRATE AND ACETAMINOPHEN 5; 325 MG/1; MG/1
1 TABLET ORAL EVERY 4 HOURS PRN
Qty: 60 TABLET | Refills: 0 | Status: SHIPPED | OUTPATIENT
Start: 2017-09-16 | End: 2017-09-23

## 2017-09-16 RX ORDER — DIPHENHYDRAMINE HYDROCHLORIDE 50 MG/ML
25 INJECTION INTRAMUSCULAR; INTRAVENOUS ONCE
Status: COMPLETED | OUTPATIENT
Start: 2017-09-16 | End: 2017-09-16

## 2017-09-16 RX ORDER — ASPIRIN 81 MG/1
81 TABLET ORAL 2 TIMES DAILY
Qty: 60 TABLET | Refills: 0 | Status: SHIPPED | OUTPATIENT
Start: 2017-09-16

## 2017-09-16 RX ORDER — DIPHENHYDRAMINE HCL 25 MG
25 TABLET ORAL ONCE
Status: COMPLETED | OUTPATIENT
Start: 2017-09-16 | End: 2017-09-16

## 2017-09-16 RX ORDER — KETOROLAC TROMETHAMINE 15 MG/ML
15 INJECTION, SOLUTION INTRAMUSCULAR; INTRAVENOUS EVERY 8 HOURS PRN
Status: DISCONTINUED | OUTPATIENT
Start: 2017-09-16 | End: 2017-09-19 | Stop reason: HOSPADM

## 2017-09-16 RX ORDER — FAMOTIDINE 20 MG/1
20 TABLET, FILM COATED ORAL 2 TIMES DAILY
Status: DISCONTINUED | OUTPATIENT
Start: 2017-09-16 | End: 2017-09-16 | Stop reason: SDUPTHER

## 2017-09-16 RX ORDER — IBUPROFEN 400 MG/1
400 TABLET ORAL EVERY 6 HOURS PRN
Status: DISCONTINUED | OUTPATIENT
Start: 2017-09-16 | End: 2017-09-16

## 2017-09-16 RX ORDER — PSEUDOEPHEDRINE HCL 30 MG
100 TABLET ORAL 2 TIMES DAILY
Qty: 60 CAPSULE | Refills: 0 | Status: SHIPPED | OUTPATIENT
Start: 2017-09-16 | End: 2018-03-26 | Stop reason: ALTCHOICE

## 2017-09-16 RX ORDER — OXYCODONE HYDROCHLORIDE AND ACETAMINOPHEN 5; 325 MG/1; MG/1
2 TABLET ORAL EVERY 4 HOURS PRN
Status: DISCONTINUED | OUTPATIENT
Start: 2017-09-16 | End: 2017-09-19 | Stop reason: HOSPADM

## 2017-09-16 RX ADMIN — CEFAZOLIN SODIUM 2 G: 2 SOLUTION INTRAVENOUS at 01:46

## 2017-09-16 RX ADMIN — ASPIRIN 81 MG: 81 TABLET, COATED ORAL at 09:32

## 2017-09-16 RX ADMIN — DOCUSATE SODIUM 100 MG: 100 CAPSULE, LIQUID FILLED ORAL at 20:26

## 2017-09-16 RX ADMIN — KETOROLAC TROMETHAMINE 15 MG: 15 INJECTION, SOLUTION INTRAMUSCULAR; INTRAVENOUS at 00:19

## 2017-09-16 RX ADMIN — OXYCODONE HYDROCHLORIDE AND ACETAMINOPHEN 2 TABLET: 5; 325 TABLET ORAL at 20:26

## 2017-09-16 RX ADMIN — FAMOTIDINE 20 MG: 10 INJECTION, SOLUTION INTRAVENOUS at 20:26

## 2017-09-16 RX ADMIN — IOPAMIDOL 100 ML: 755 INJECTION, SOLUTION INTRAVENOUS at 21:38

## 2017-09-16 RX ADMIN — DIPHENHYDRAMINE HCL 25 MG: 25 TABLET ORAL at 11:38

## 2017-09-16 RX ADMIN — SODIUM CHLORIDE: 9 INJECTION, SOLUTION INTRAVENOUS at 20:26

## 2017-09-16 RX ADMIN — SENNOSIDES AND DOCUSATE SODIUM 1 TABLET: 8.6; 5 TABLET ORAL at 09:32

## 2017-09-16 RX ADMIN — SODIUM CHLORIDE, PRESERVATIVE FREE 10 ML: 5 INJECTION INTRAVENOUS at 09:35

## 2017-09-16 RX ADMIN — DIPHENHYDRAMINE HYDROCHLORIDE 25 MG: 50 INJECTION, SOLUTION INTRAMUSCULAR; INTRAVENOUS at 01:00

## 2017-09-16 RX ADMIN — ASPIRIN 81 MG: 81 TABLET, COATED ORAL at 20:26

## 2017-09-16 RX ADMIN — CHLORTHALIDONE 25 MG: 25 TABLET ORAL at 09:32

## 2017-09-16 RX ADMIN — KETOROLAC TROMETHAMINE 15 MG: 15 INJECTION, SOLUTION INTRAMUSCULAR; INTRAVENOUS at 18:50

## 2017-09-16 RX ADMIN — AMLODIPINE BESYLATE 5 MG: 5 TABLET ORAL at 09:32

## 2017-09-16 RX ADMIN — OXYCODONE HYDROCHLORIDE AND ACETAMINOPHEN 2 TABLET: 5; 325 TABLET ORAL at 05:55

## 2017-09-16 RX ADMIN — SODIUM CHLORIDE, PRESERVATIVE FREE 10 ML: 5 INJECTION INTRAVENOUS at 20:26

## 2017-09-16 RX ADMIN — IBUPROFEN 400 MG: 400 TABLET, FILM COATED ORAL at 15:43

## 2017-09-16 RX ADMIN — METOPROLOL SUCCINATE 50 MG: 50 TABLET, EXTENDED RELEASE ORAL at 09:32

## 2017-09-16 RX ADMIN — DOCUSATE SODIUM 100 MG: 100 CAPSULE, LIQUID FILLED ORAL at 09:32

## 2017-09-16 RX ADMIN — OXYCODONE HYDROCHLORIDE AND ACETAMINOPHEN 2 TABLET: 5; 325 TABLET ORAL at 10:03

## 2017-09-16 RX ADMIN — FAMOTIDINE 20 MG: 20 TABLET ORAL at 15:47

## 2017-09-16 RX ADMIN — DIPHENHYDRAMINE HCL 25 MG: 25 TABLET ORAL at 18:17

## 2017-09-16 ASSESSMENT — PAIN SCALES - GENERAL
PAINLEVEL_OUTOF10: 4
PAINLEVEL_OUTOF10: 7
PAINLEVEL_OUTOF10: 8
PAINLEVEL_OUTOF10: 5
PAINLEVEL_OUTOF10: 9
PAINLEVEL_OUTOF10: 7
PAINLEVEL_OUTOF10: 2
PAINLEVEL_OUTOF10: 9
PAINLEVEL_OUTOF10: 7

## 2017-09-16 ASSESSMENT — PAIN DESCRIPTION - PAIN TYPE
TYPE: SURGICAL PAIN
TYPE: ACUTE PAIN;SURGICAL PAIN

## 2017-09-16 ASSESSMENT — ENCOUNTER SYMPTOMS
VOMITING: 0
SHORTNESS OF BREATH: 1
NAUSEA: 0
DIARRHEA: 0

## 2017-09-16 ASSESSMENT — PAIN DESCRIPTION - LOCATION
LOCATION: HIP;INCISION
LOCATION: KNEE
LOCATION: HIP;INCISION
LOCATION: KNEE
LOCATION: HIP;KNEE

## 2017-09-16 ASSESSMENT — PAIN DESCRIPTION - ORIENTATION
ORIENTATION: LEFT;RIGHT
ORIENTATION: LEFT
ORIENTATION: RIGHT
ORIENTATION: RIGHT
ORIENTATION: LEFT

## 2017-09-16 ASSESSMENT — PAIN DESCRIPTION - FREQUENCY
FREQUENCY: INTERMITTENT
FREQUENCY: CONTINUOUS
FREQUENCY: CONTINUOUS

## 2017-09-16 ASSESSMENT — PAIN DESCRIPTION - ONSET
ONSET: ON-GOING
ONSET: GRADUAL
ONSET: ON-GOING

## 2017-09-16 ASSESSMENT — PAIN DESCRIPTION - PROGRESSION
CLINICAL_PROGRESSION: GRADUALLY WORSENING
CLINICAL_PROGRESSION: GRADUALLY IMPROVING
CLINICAL_PROGRESSION: GRADUALLY WORSENING
CLINICAL_PROGRESSION: GRADUALLY WORSENING

## 2017-09-16 ASSESSMENT — PAIN DESCRIPTION - DESCRIPTORS: DESCRIPTORS: ACHING;BURNING

## 2017-09-17 LAB
ANION GAP SERPL CALCULATED.3IONS-SCNC: 15 MEQ/L (ref 7–13)
BUN BLDV-MCNC: 22 MG/DL (ref 8–23)
CALCIUM SERPL-MCNC: 8.9 MG/DL (ref 8.6–10.2)
CHLORIDE BLD-SCNC: 97 MEQ/L (ref 98–107)
CO2: 25 MEQ/L (ref 22–29)
CREAT SERPL-MCNC: 0.74 MG/DL (ref 0.5–0.9)
GFR AFRICAN AMERICAN: >60
GFR NON-AFRICAN AMERICAN: >60
GLUCOSE BLD-MCNC: 128 MG/DL (ref 74–109)
HCT VFR BLD CALC: 36.5 % (ref 37–47)
HEMOGLOBIN: 12.4 G/DL (ref 12–16)
MCH RBC QN AUTO: 29.8 PG (ref 27–31.3)
MCHC RBC AUTO-ENTMCNC: 34 % (ref 33–37)
MCV RBC AUTO: 87.7 FL (ref 82–100)
PDW BLD-RTO: 14 % (ref 11.5–14.5)
PLATELET # BLD: 187 K/UL (ref 130–400)
POTASSIUM SERPL-SCNC: 4.8 MEQ/L (ref 3.5–5.1)
RBC # BLD: 4.16 M/UL (ref 4.2–5.4)
SODIUM BLD-SCNC: 137 MEQ/L (ref 132–144)
WBC # BLD: 9.6 K/UL (ref 4.8–10.8)

## 2017-09-17 PROCEDURE — 1210000000 HC MED SURG R&B

## 2017-09-17 PROCEDURE — 6370000000 HC RX 637 (ALT 250 FOR IP): Performed by: PHYSICIAN ASSISTANT

## 2017-09-17 PROCEDURE — 97110 THERAPEUTIC EXERCISES: CPT

## 2017-09-17 PROCEDURE — S0028 INJECTION, FAMOTIDINE, 20 MG: HCPCS | Performed by: NURSE PRACTITIONER

## 2017-09-17 PROCEDURE — 97116 GAIT TRAINING THERAPY: CPT

## 2017-09-17 PROCEDURE — 6370000000 HC RX 637 (ALT 250 FOR IP): Performed by: NURSE PRACTITIONER

## 2017-09-17 PROCEDURE — 80048 BASIC METABOLIC PNL TOTAL CA: CPT

## 2017-09-17 PROCEDURE — 6360000002 HC RX W HCPCS: Performed by: INTERNAL MEDICINE

## 2017-09-17 PROCEDURE — 36415 COLL VENOUS BLD VENIPUNCTURE: CPT

## 2017-09-17 PROCEDURE — 6370000000 HC RX 637 (ALT 250 FOR IP): Performed by: ORTHOPAEDIC SURGERY

## 2017-09-17 PROCEDURE — 97535 SELF CARE MNGMENT TRAINING: CPT

## 2017-09-17 PROCEDURE — 6370000000 HC RX 637 (ALT 250 FOR IP): Performed by: INTERNAL MEDICINE

## 2017-09-17 PROCEDURE — 2500000003 HC RX 250 WO HCPCS: Performed by: NURSE PRACTITIONER

## 2017-09-17 PROCEDURE — 85027 COMPLETE CBC AUTOMATED: CPT

## 2017-09-17 PROCEDURE — 2700000000 HC OXYGEN THERAPY PER DAY

## 2017-09-17 PROCEDURE — 94664 DEMO&/EVAL PT USE INHALER: CPT

## 2017-09-17 PROCEDURE — 6360000002 HC RX W HCPCS: Performed by: ORTHOPAEDIC SURGERY

## 2017-09-17 PROCEDURE — 99222 1ST HOSP IP/OBS MODERATE 55: CPT | Performed by: INTERNAL MEDICINE

## 2017-09-17 RX ORDER — ALBUTEROL SULFATE 2.5 MG/3ML
2.5 SOLUTION RESPIRATORY (INHALATION) EVERY 6 HOURS PRN
Status: DISCONTINUED | OUTPATIENT
Start: 2017-09-17 | End: 2017-09-19 | Stop reason: HOSPADM

## 2017-09-17 RX ORDER — OXYCODONE HYDROCHLORIDE AND ACETAMINOPHEN 5; 325 MG/1; MG/1
1 TABLET ORAL EVERY 4 HOURS PRN
Status: DISCONTINUED | OUTPATIENT
Start: 2017-09-17 | End: 2017-09-19 | Stop reason: HOSPADM

## 2017-09-17 RX ADMIN — PRAVASTATIN SODIUM 40 MG: 40 TABLET ORAL at 00:39

## 2017-09-17 RX ADMIN — ONDANSETRON 4 MG: 2 INJECTION INTRAMUSCULAR; INTRAVENOUS at 13:58

## 2017-09-17 RX ADMIN — OXYCODONE HYDROCHLORIDE AND ACETAMINOPHEN 1 TABLET: 5; 325 TABLET ORAL at 21:20

## 2017-09-17 RX ADMIN — ASPIRIN 81 MG: 81 TABLET, COATED ORAL at 21:20

## 2017-09-17 RX ADMIN — AMLODIPINE BESYLATE 5 MG: 5 TABLET ORAL at 08:57

## 2017-09-17 RX ADMIN — OXYCODONE HYDROCHLORIDE AND ACETAMINOPHEN 2 TABLET: 5; 325 TABLET ORAL at 06:30

## 2017-09-17 RX ADMIN — OXYCODONE HYDROCHLORIDE AND ACETAMINOPHEN 2 TABLET: 5; 325 TABLET ORAL at 10:41

## 2017-09-17 RX ADMIN — DOCUSATE SODIUM 100 MG: 100 CAPSULE, LIQUID FILLED ORAL at 08:56

## 2017-09-17 RX ADMIN — PRAVASTATIN SODIUM 40 MG: 40 TABLET ORAL at 21:19

## 2017-09-17 RX ADMIN — SENNOSIDES AND DOCUSATE SODIUM 1 TABLET: 8.6; 5 TABLET ORAL at 08:56

## 2017-09-17 RX ADMIN — METOPROLOL SUCCINATE 50 MG: 50 TABLET, EXTENDED RELEASE ORAL at 08:57

## 2017-09-17 RX ADMIN — OXYCODONE HYDROCHLORIDE AND ACETAMINOPHEN 2 TABLET: 5; 325 TABLET ORAL at 15:41

## 2017-09-17 RX ADMIN — FAMOTIDINE 20 MG: 10 INJECTION, SOLUTION INTRAVENOUS at 08:56

## 2017-09-17 RX ADMIN — DOCUSATE SODIUM 100 MG: 100 CAPSULE, LIQUID FILLED ORAL at 21:20

## 2017-09-17 RX ADMIN — OXYCODONE HYDROCHLORIDE AND ACETAMINOPHEN 2 TABLET: 5; 325 TABLET ORAL at 00:31

## 2017-09-17 RX ADMIN — CHLORTHALIDONE 25 MG: 25 TABLET ORAL at 08:56

## 2017-09-17 RX ADMIN — ASPIRIN 81 MG: 81 TABLET, COATED ORAL at 12:22

## 2017-09-17 RX ADMIN — KETOROLAC TROMETHAMINE 15 MG: 15 INJECTION, SOLUTION INTRAMUSCULAR; INTRAVENOUS at 21:20

## 2017-09-17 ASSESSMENT — PAIN DESCRIPTION - FREQUENCY: FREQUENCY: INTERMITTENT

## 2017-09-17 ASSESSMENT — PAIN DESCRIPTION - ONSET: ONSET: GRADUAL

## 2017-09-17 ASSESSMENT — PAIN DESCRIPTION - PROGRESSION
CLINICAL_PROGRESSION: GRADUALLY WORSENING
CLINICAL_PROGRESSION: RESOLVED
CLINICAL_PROGRESSION: GRADUALLY WORSENING
CLINICAL_PROGRESSION: GRADUALLY WORSENING
CLINICAL_PROGRESSION: RESOLVED
CLINICAL_PROGRESSION: RESOLVED
CLINICAL_PROGRESSION: GRADUALLY WORSENING
CLINICAL_PROGRESSION: RESOLVED

## 2017-09-17 ASSESSMENT — PAIN SCALES - GENERAL
PAINLEVEL_OUTOF10: 7
PAINLEVEL_OUTOF10: 2
PAINLEVEL_OUTOF10: 0
PAINLEVEL_OUTOF10: 0
PAINLEVEL_OUTOF10: 7
PAINLEVEL_OUTOF10: 3
PAINLEVEL_OUTOF10: 5
PAINLEVEL_OUTOF10: 7
PAINLEVEL_OUTOF10: 8
PAINLEVEL_OUTOF10: 2

## 2017-09-17 ASSESSMENT — ENCOUNTER SYMPTOMS
VOMITING: 0
SHORTNESS OF BREATH: 0
NAUSEA: 0

## 2017-09-17 ASSESSMENT — PAIN DESCRIPTION - DESCRIPTORS
DESCRIPTORS: ACHING
DESCRIPTORS: ACHING;BURNING;CONSTANT

## 2017-09-17 ASSESSMENT — PAIN DESCRIPTION - PAIN TYPE
TYPE: SURGICAL PAIN

## 2017-09-17 ASSESSMENT — PAIN DESCRIPTION - ORIENTATION
ORIENTATION: RIGHT

## 2017-09-17 ASSESSMENT — PAIN DESCRIPTION - LOCATION
LOCATION: KNEE

## 2017-09-17 ASSESSMENT — ACTIVITIES OF DAILY LIVING (ADL): EFFECT OF PAIN ON DAILY ACTIVITIES: F

## 2017-09-18 ENCOUNTER — APPOINTMENT (OUTPATIENT)
Dept: GENERAL RADIOLOGY | Age: 82
DRG: 470 | End: 2017-09-18
Attending: ORTHOPAEDIC SURGERY
Payer: MEDICARE

## 2017-09-18 LAB
HCT VFR BLD CALC: 36.5 % (ref 37–47)
HEMOGLOBIN: 12.6 G/DL (ref 12–16)
LV EF: 60 %
LVEF MODALITY: NORMAL
MCH RBC QN AUTO: 30 PG (ref 27–31.3)
MCHC RBC AUTO-ENTMCNC: 34.5 % (ref 33–37)
MCV RBC AUTO: 86.8 FL (ref 82–100)
PDW BLD-RTO: 13.7 % (ref 11.5–14.5)
PLATELET # BLD: 185 K/UL (ref 130–400)
RBC # BLD: 4.2 M/UL (ref 4.2–5.4)
WBC # BLD: 9.7 K/UL (ref 4.8–10.8)

## 2017-09-18 PROCEDURE — 99232 SBSQ HOSP IP/OBS MODERATE 35: CPT | Performed by: INTERNAL MEDICINE

## 2017-09-18 PROCEDURE — 94762 N-INVAS EAR/PLS OXIMTRY CONT: CPT

## 2017-09-18 PROCEDURE — 71020 XR CHEST STANDARD TWO VW: CPT

## 2017-09-18 PROCEDURE — S0028 INJECTION, FAMOTIDINE, 20 MG: HCPCS | Performed by: NURSE PRACTITIONER

## 2017-09-18 PROCEDURE — 6370000000 HC RX 637 (ALT 250 FOR IP): Performed by: INTERNAL MEDICINE

## 2017-09-18 PROCEDURE — 2580000003 HC RX 258: Performed by: ORTHOPAEDIC SURGERY

## 2017-09-18 PROCEDURE — 2500000003 HC RX 250 WO HCPCS: Performed by: NURSE PRACTITIONER

## 2017-09-18 PROCEDURE — 1210000000 HC MED SURG R&B

## 2017-09-18 PROCEDURE — 6370000000 HC RX 637 (ALT 250 FOR IP): Performed by: ORTHOPAEDIC SURGERY

## 2017-09-18 PROCEDURE — 6370000000 HC RX 637 (ALT 250 FOR IP): Performed by: PHYSICIAN ASSISTANT

## 2017-09-18 PROCEDURE — 93306 TTE W/DOPPLER COMPLETE: CPT

## 2017-09-18 PROCEDURE — 97116 GAIT TRAINING THERAPY: CPT

## 2017-09-18 PROCEDURE — 36415 COLL VENOUS BLD VENIPUNCTURE: CPT

## 2017-09-18 PROCEDURE — 85027 COMPLETE CBC AUTOMATED: CPT

## 2017-09-18 PROCEDURE — 97110 THERAPEUTIC EXERCISES: CPT

## 2017-09-18 RX ADMIN — AMLODIPINE BESYLATE 5 MG: 5 TABLET ORAL at 09:37

## 2017-09-18 RX ADMIN — SENNOSIDES AND DOCUSATE SODIUM 1 TABLET: 8.6; 5 TABLET ORAL at 09:37

## 2017-09-18 RX ADMIN — METOPROLOL SUCCINATE 50 MG: 50 TABLET, EXTENDED RELEASE ORAL at 09:37

## 2017-09-18 RX ADMIN — MAGNESIUM HYDROXIDE 30 ML: 400 SUSPENSION ORAL at 18:08

## 2017-09-18 RX ADMIN — ASPIRIN 81 MG: 81 TABLET, COATED ORAL at 22:15

## 2017-09-18 RX ADMIN — SODIUM CHLORIDE, PRESERVATIVE FREE 10 ML: 5 INJECTION INTRAVENOUS at 22:17

## 2017-09-18 RX ADMIN — CHLORTHALIDONE 25 MG: 25 TABLET ORAL at 09:37

## 2017-09-18 RX ADMIN — DOCUSATE SODIUM 100 MG: 100 CAPSULE, LIQUID FILLED ORAL at 09:37

## 2017-09-18 RX ADMIN — DOCUSATE SODIUM 100 MG: 100 CAPSULE, LIQUID FILLED ORAL at 22:15

## 2017-09-18 RX ADMIN — FAMOTIDINE 20 MG: 10 INJECTION, SOLUTION INTRAVENOUS at 09:37

## 2017-09-18 RX ADMIN — ASPIRIN 81 MG: 81 TABLET, COATED ORAL at 09:37

## 2017-09-18 RX ADMIN — OXYCODONE HYDROCHLORIDE AND ACETAMINOPHEN 1 TABLET: 5; 325 TABLET ORAL at 06:28

## 2017-09-18 RX ADMIN — OXYCODONE HYDROCHLORIDE AND ACETAMINOPHEN 1 TABLET: 5; 325 TABLET ORAL at 11:04

## 2017-09-18 RX ADMIN — SODIUM CHLORIDE, PRESERVATIVE FREE 10 ML: 5 INJECTION INTRAVENOUS at 09:37

## 2017-09-18 RX ADMIN — PRAVASTATIN SODIUM 40 MG: 40 TABLET ORAL at 22:16

## 2017-09-18 ASSESSMENT — PAIN SCALES - GENERAL
PAINLEVEL_OUTOF10: 5
PAINLEVEL_OUTOF10: 6
PAINLEVEL_OUTOF10: 0
PAINLEVEL_OUTOF10: 0
PAINLEVEL_OUTOF10: 3
PAINLEVEL_OUTOF10: 0

## 2017-09-19 ENCOUNTER — APPOINTMENT (OUTPATIENT)
Dept: GENERAL RADIOLOGY | Age: 82
DRG: 470 | End: 2017-09-19
Attending: ORTHOPAEDIC SURGERY
Payer: MEDICARE

## 2017-09-19 VITALS
HEIGHT: 63 IN | HEART RATE: 77 BPM | BODY MASS INDEX: 27.82 KG/M2 | OXYGEN SATURATION: 95 % | RESPIRATION RATE: 18 BRPM | DIASTOLIC BLOOD PRESSURE: 62 MMHG | TEMPERATURE: 98.1 F | SYSTOLIC BLOOD PRESSURE: 157 MMHG | WEIGHT: 157 LBS

## 2017-09-19 LAB
EKG ATRIAL RATE: 87 BPM
EKG P AXIS: 45 DEGREES
EKG P-R INTERVAL: 218 MS
EKG Q-T INTERVAL: 362 MS
EKG QRS DURATION: 98 MS
EKG QTC CALCULATION (BAZETT): 435 MS
EKG R AXIS: -27 DEGREES
EKG T AXIS: 44 DEGREES
EKG VENTRICULAR RATE: 87 BPM

## 2017-09-19 PROCEDURE — 6370000000 HC RX 637 (ALT 250 FOR IP): Performed by: INTERNAL MEDICINE

## 2017-09-19 PROCEDURE — 97535 SELF CARE MNGMENT TRAINING: CPT

## 2017-09-19 PROCEDURE — 71020 XR CHEST STANDARD TWO VW: CPT

## 2017-09-19 PROCEDURE — 97116 GAIT TRAINING THERAPY: CPT

## 2017-09-19 PROCEDURE — 2500000003 HC RX 250 WO HCPCS: Performed by: NURSE PRACTITIONER

## 2017-09-19 PROCEDURE — 99232 SBSQ HOSP IP/OBS MODERATE 35: CPT | Performed by: INTERNAL MEDICINE

## 2017-09-19 PROCEDURE — 6370000000 HC RX 637 (ALT 250 FOR IP): Performed by: ORTHOPAEDIC SURGERY

## 2017-09-19 PROCEDURE — 93010 ELECTROCARDIOGRAM REPORT: CPT | Performed by: INTERNAL MEDICINE

## 2017-09-19 PROCEDURE — S0028 INJECTION, FAMOTIDINE, 20 MG: HCPCS | Performed by: NURSE PRACTITIONER

## 2017-09-19 PROCEDURE — 6370000000 HC RX 637 (ALT 250 FOR IP): Performed by: PHYSICIAN ASSISTANT

## 2017-09-19 PROCEDURE — 2700000000 HC OXYGEN THERAPY PER DAY

## 2017-09-19 PROCEDURE — 94760 N-INVAS EAR/PLS OXIMETRY 1: CPT

## 2017-09-19 PROCEDURE — 2580000003 HC RX 258: Performed by: ORTHOPAEDIC SURGERY

## 2017-09-19 RX ADMIN — OXYCODONE HYDROCHLORIDE AND ACETAMINOPHEN 1 TABLET: 5; 325 TABLET ORAL at 17:41

## 2017-09-19 RX ADMIN — DOCUSATE SODIUM 100 MG: 100 CAPSULE, LIQUID FILLED ORAL at 09:37

## 2017-09-19 RX ADMIN — SODIUM CHLORIDE, PRESERVATIVE FREE 10 ML: 5 INJECTION INTRAVENOUS at 09:38

## 2017-09-19 RX ADMIN — METOPROLOL SUCCINATE 50 MG: 50 TABLET, EXTENDED RELEASE ORAL at 09:36

## 2017-09-19 RX ADMIN — ASPIRIN 81 MG: 81 TABLET, COATED ORAL at 09:37

## 2017-09-19 RX ADMIN — SENNOSIDES AND DOCUSATE SODIUM 1 TABLET: 8.6; 5 TABLET ORAL at 09:37

## 2017-09-19 RX ADMIN — CHLORTHALIDONE 25 MG: 25 TABLET ORAL at 09:37

## 2017-09-19 RX ADMIN — OXYCODONE HYDROCHLORIDE AND ACETAMINOPHEN 1 TABLET: 5; 325 TABLET ORAL at 07:02

## 2017-09-19 RX ADMIN — OXYCODONE HYDROCHLORIDE AND ACETAMINOPHEN 1 TABLET: 5; 325 TABLET ORAL at 13:07

## 2017-09-19 RX ADMIN — AMLODIPINE BESYLATE 5 MG: 5 TABLET ORAL at 09:37

## 2017-09-19 ASSESSMENT — PAIN DESCRIPTION - ORIENTATION: ORIENTATION: RIGHT

## 2017-09-19 ASSESSMENT — PAIN SCALES - GENERAL
PAINLEVEL_OUTOF10: 2
PAINLEVEL_OUTOF10: 4
PAINLEVEL_OUTOF10: 7
PAINLEVEL_OUTOF10: 7

## 2017-09-19 ASSESSMENT — PAIN DESCRIPTION - DESCRIPTORS: DESCRIPTORS: ACHING

## 2017-09-19 ASSESSMENT — PAIN DESCRIPTION - LOCATION: LOCATION: KNEE

## 2017-09-19 ASSESSMENT — PAIN DESCRIPTION - PAIN TYPE: TYPE: SURGICAL PAIN

## 2017-09-26 ENCOUNTER — TELEPHONE (OUTPATIENT)
Dept: PULMONOLOGY | Age: 82
End: 2017-09-26

## 2017-10-03 ENCOUNTER — OFFICE VISIT (OUTPATIENT)
Dept: PULMONOLOGY | Age: 82
End: 2017-10-03

## 2017-10-03 VITALS
OXYGEN SATURATION: 95 % | SYSTOLIC BLOOD PRESSURE: 138 MMHG | RESPIRATION RATE: 14 BRPM | HEART RATE: 114 BPM | TEMPERATURE: 97.4 F | DIASTOLIC BLOOD PRESSURE: 74 MMHG

## 2017-10-03 DIAGNOSIS — I27.20 PULMONARY HTN (HCC): ICD-10-CM

## 2017-10-03 DIAGNOSIS — J44.9 CHRONIC OBSTRUCTIVE PULMONARY DISEASE, UNSPECIFIED COPD TYPE (HCC): Primary | ICD-10-CM

## 2017-10-03 PROCEDURE — 99214 OFFICE O/P EST MOD 30 MIN: CPT | Performed by: INTERNAL MEDICINE

## 2017-10-03 RX ORDER — OXYCODONE HYDROCHLORIDE AND ACETAMINOPHEN 5; 325 MG/1; MG/1
TABLET ORAL
COMMUNITY
Start: 2017-09-14 | End: 2018-01-19 | Stop reason: ALTCHOICE

## 2017-10-03 RX ORDER — TRAMADOL HYDROCHLORIDE 50 MG/1
TABLET ORAL
COMMUNITY
Start: 2017-09-28 | End: 2018-01-19 | Stop reason: ALTCHOICE

## 2017-10-03 ASSESSMENT — ENCOUNTER SYMPTOMS
RHINORRHEA: 0
WHEEZING: 1
DIARRHEA: 0
NAUSEA: 0
ABDOMINAL PAIN: 0
CHEST TIGHTNESS: 0
SINUS PRESSURE: 0
VOMITING: 0
SHORTNESS OF BREATH: 1
COUGH: 1
SORE THROAT: 0

## 2017-10-03 NOTE — MR AVS SNAPSHOT
After Visit Summary             Josemanuel Garsia   10/3/2017 2:45 PM   Office Visit    Description:  Female : 1932   Provider:  Jluis Knapp MD   Department:  [facility]mon[facility]              Your Follow-Up and Future Appointments         Below is a list of your follow-up and future appointments. This may not be a complete list as you may have made appointments directly with providers that we are not aware of or your providers may have made some for you. Please call your providers to confirm appointments. It is important to keep your appointments. Please bring your current insurance card, photo ID, co-pay, and all medication bottles to your appointment. If self-pay, payment is expected at the time of service. Your To-Do List     Future Appointments Provider Department Dept Phone    2017 1:45 PM Jluis Knapp MD [facility] [phone]    Please arrive 15 minutes prior to appointment time, bring insurance card and photo ID. Future Orders Complete By Expires    FULL PFT STUDY WITH PRE AND POST [PFT30 Custom]  10/3/2017 2017    Follow-Up    Return in about 2 months (around 12/3/2017) for re-evaluation, after PFTs. Information from Your Visit        Department     Name Address Phone Fax    [facility] Pulmonology [address] [address] Иван Jean-Pierre Zaidi Radames [phone]      You Were Seen for:         Comments    Chronic obstructive pulmonary disease, unspecified COPD type [facility])   [[id number]]         Vital Signs     Blood Pressure Pulse Temperature Respirations Oxygen Saturation Smoking Status    138/74 (Site: Right Arm, Position: Sitting, Cuff Size: Medium Adult) 114 97.4 °F (36.3 °C) (Tympanic) 14 95% Former Smoker      Additional Information about your Body Mass Index (BMI)           Your BMI as listed above is considered overweight (25.0-29.9). BMI is an estimate of body fat, calculated from your height and weight.   The higher Additional Information        Basic Information     Date Of Birth Sex Race Ethnicity Preferred Language    12/12/1932 Female White Non-/Non  English      Problem List as of 10/3/2017  Date Reviewed: 10/3/2017                Right knee DJD (Chronic)    Hyperlipidemia    HTN (hypertension) (Chronic)    Macular degeneration (Chronic)    Degeneration of intervertebral disc of lumbosacral region    Osteoporosis      Preventive Care        Date Due    Tetanus Combination Vaccine (1 - Tdap) 12/12/1951    Zoster Vaccine 12/12/1992    Osteoporosis screening or a bone density scan (Dexa) is recommended once at age 72. Based upon the results and risk factors for bone loss, your provider will recommend whether this needs to be repeated. 12/12/1997    Pneumococcal Vaccines (two) for all adults aged 72 and over (1 of 2 - PCV13) 12/12/1997    Yearly Flu Vaccine (1) 9/1/2017            [employer] Signup           Our records indicate that you have an active [employer] account. You can view your After Visit Summary by going to [email or web address] and logging in with your [employer] username and password. If you don't have a [employer] username and password but a parent or guardian has access to your record, the parent or guardian should login with their own [employer] username and password and access your record to view the After Visit Summary. Additional Information  If you have questions, please contact the physician practice where you receive care. Remember, [employer] is NOT to be used for urgent needs. For medical emergencies, dial 911. For questions regarding your [employer] account call [phone]. If you have a clinical question, please call your doctor's office.

## 2017-10-03 NOTE — PROGRESS NOTES
Subjective:     Brigette Castillo is a 80 y.o. female who complains today of:     Chief Complaint   Patient presents with    COPD     needs to be ok'd to start therapy pt desating when she moves around   [address] from Hospital       [facility]  Patient is here for a hospital follow-up visit today. She was hospitalized recently with evidence of hypoxia and respiratory failure following recent total knee arthroplasty. Patient was diagnosed with moderate pulmonary hypertension. She had a CT of the chest done that showed mild emphysematous changes. She's had remote smoking history in the past.  She's never been diagnosed with COPD or had PFTs done before. Nocturnal oximetry testing done before discharge showed significant desaturations and patient was sent home with continuous oxygen therapy. Since then she was noted to have gradual improvement in her breathing as well as oxygenation level she is now only desaturating into the mid to upper 80s with exertion as opposed to the 70s at the time of discharge. She is monitored closely by family. She is being set up for outpatient therapy regarding her knee. We talked about pulmonary rehabilitation today at length.   We also discussed TFTs to assess the presence and the severity of her COPD    Allergies:  Bisphosphonates; Sulfa antibiotics; and Lisinopril  Past Medical History:   Diagnosis Date    HTN (hypertension) 9/7/2017    Hyperlipidemia     Hypertension     Macular degeneration     Osteoporosis     PONV (postoperative nausea and vomiting)     Right knee DJD 9/7/2017     Past Surgical History:   Procedure Laterality Date    APPENDECTOMY      HYSTERECTOMY      OTHER SURGICAL HISTORY Right 08/23/2016    EXCISION UPPER BACK MASS    NE TOTAL KNEE ARTHROPLASTY Right 9/15/2017    RIGHT  KNEE TOTAL ARTHROPLASTY performed by Adela Hernandez MD at [facility]     Family History   Problem Relation Age of Onset    Heart Disease Mother     Heart Disease Father     Cancer Other      breast     Social History     Social History    Marital status: Single     Spouse name: N/A    Number of children: N/A    Years of education: N/A     Occupational History    Not on file. Social History Main Topics    Smoking status: Former Smoker     Quit date: 1/1/1986    Smokeless tobacco: Not on file    Alcohol use No    Drug use: No    Sexual activity: Not on file     Other Topics Concern    Not on file     Social History Narrative         Review of Systems   Constitutional: Positive for fatigue. Negative for appetite change, chills, diaphoresis and fever. HENT: Negative for congestion, nosebleeds, postnasal drip, rhinorrhea, sinus pressure, sneezing and sore throat. Eyes: Negative for visual disturbance. Respiratory: Positive for cough, shortness of breath and wheezing. Negative for chest tightness. Cardiovascular: Positive for leg swelling. Negative for chest pain and palpitations. Gastrointestinal: Negative for abdominal pain, diarrhea, nausea and vomiting. Genitourinary: Negative for dysuria and urgency. Musculoskeletal: Negative for arthralgias, joint swelling and myalgias. Skin: Negative for rash. Allergic/Immunologic: Positive for environmental allergies. Neurological: Negative for tremors, weakness, light-headedness and headaches. Psychiatric/Behavioral: Positive for sleep disturbance. Negative for behavioral problems. Objective:     Vitals:    10/03/17 1507   BP: 138/74   Site: Right Arm   Position: Sitting   Cuff Size: Medium Adult   Pulse: 114   Resp: 14   Temp: 97.4 °F (36.3 °C)   TempSrc: Tympanic   SpO2: 95%         Physical Exam   Constitutional: She is oriented to person, place, and time. She appears well-developed and well-nourished. HENT:   Head: Normocephalic and atraumatic. Mouth/Throat: Oropharynx is clear and moist.   Eyes: EOM are normal. Pupils are equal, round, and reactive to light. Neck: No JVD present.  No tracheal

## 2017-10-12 ENCOUNTER — HOSPITAL ENCOUNTER (OUTPATIENT)
Dept: PULMONOLOGY | Age: 82
Discharge: HOME | End: 2017-10-12
Payer: MEDICARE

## 2017-10-12 DIAGNOSIS — J44.9 CHRONIC OBSTRUCTIVE PULMONARY DISEASE, UNSPECIFIED COPD TYPE (HCC): ICD-10-CM

## 2017-10-12 PROCEDURE — 94010 BREATHING CAPACITY TEST: CPT

## 2017-10-12 PROCEDURE — 94729 DIFFUSING CAPACITY: CPT | Performed by: INTERNAL MEDICINE

## 2017-10-12 PROCEDURE — 94010 BREATHING CAPACITY TEST: CPT | Performed by: INTERNAL MEDICINE

## 2017-10-12 PROCEDURE — 94726 PLETHYSMOGRAPHY LUNG VOLUMES: CPT | Performed by: INTERNAL MEDICINE

## 2017-10-12 PROCEDURE — 94726 PLETHYSMOGRAPHY LUNG VOLUMES: CPT

## 2017-10-12 PROCEDURE — 94729 DIFFUSING CAPACITY: CPT

## 2017-10-16 NOTE — PROCEDURES
[address]                     [address] [place], [address] [facility]                              PULMONARY FUNCTION    PATIENT NAME: Mary Carmen Egan                     :             1932  MED REC NO:   [id number]                           ROOM:  ACCOUNT NO:   [de-identified]                          ADMISSION DATE:  10/12/2017  PROVIDER:     Chika Garcia MD    DATE OF PROCEDURE:  10/12/2017    REASON FOR STUDY:  Shortness of breath and cough. INTERPRETATION:  FVC is 2.21, 98% of predicted. FEV1 is 1.76, 105% of  predicted. FEV1/FVC is 79%. FEF 25-75% is 1.59, 144% of predicted. Lung volume study shows the residual volume is 2.41, 98% of predicted. TLC is 4.91, 100% of predicted. Diffusion capacity is 9.44, 51% of  predicted. Airway resistance is 1.36, 73% of predicted. SUMMARY:  Normal spirometry. Static lung volume study within normal  limits. Diffusion capacity is moderately-severely impaired. Airway  resistance is low. Flow volume loop is not diagnostic of obstructive  or restrictive pulmonary disease. Clinical correlation is requested.           Leif Romano MD    D: 10/16/2017 9:30:34       T: 10/16/2017 10:34:58     [id number]  Job#: [id number]     Doc#: [id number]

## 2017-11-07 ENCOUNTER — HOSPITAL ENCOUNTER (OUTPATIENT)
Dept: PULMONOLOGY | Age: 82
Setting detail: THERAPIES SERIES
Discharge: HOME | End: 2017-11-07
Payer: MEDICARE

## 2017-11-07 PROCEDURE — G0424 PULMONARY REHAB W EXER: HCPCS

## 2017-11-14 ENCOUNTER — HOSPITAL ENCOUNTER (OUTPATIENT)
Dept: PULMONOLOGY | Age: 82
Setting detail: THERAPIES SERIES
Discharge: HOME | End: 2017-11-14
Payer: MEDICARE

## 2017-11-14 PROCEDURE — G0424 PULMONARY REHAB W EXER: HCPCS

## 2017-11-16 ENCOUNTER — HOSPITAL ENCOUNTER (OUTPATIENT)
Dept: PULMONOLOGY | Age: 82
Setting detail: THERAPIES SERIES
Discharge: HOME | End: 2017-11-16
Payer: MEDICARE

## 2017-11-16 PROCEDURE — G0424 PULMONARY REHAB W EXER: HCPCS

## 2017-11-21 ENCOUNTER — HOSPITAL ENCOUNTER (OUTPATIENT)
Dept: PULMONOLOGY | Age: 82
Setting detail: THERAPIES SERIES
Discharge: HOME | End: 2017-11-21
Payer: MEDICARE

## 2017-11-21 PROCEDURE — G0424 PULMONARY REHAB W EXER: HCPCS

## 2017-11-28 ENCOUNTER — HOSPITAL ENCOUNTER (OUTPATIENT)
Dept: PULMONOLOGY | Age: 82
Setting detail: THERAPIES SERIES
Discharge: HOME | End: 2017-11-28
Payer: MEDICARE

## 2017-11-28 PROCEDURE — G0237 THERAPEUTIC PROCD STRG ENDUR: HCPCS

## 2017-11-28 PROCEDURE — G0424 PULMONARY REHAB W EXER: HCPCS

## 2017-11-30 ENCOUNTER — HOSPITAL ENCOUNTER (OUTPATIENT)
Dept: PULMONOLOGY | Age: 82
Setting detail: THERAPIES SERIES
Discharge: HOME | End: 2017-11-30
Payer: MEDICARE

## 2017-11-30 PROCEDURE — G0424 PULMONARY REHAB W EXER: HCPCS

## 2017-11-30 PROCEDURE — G0237 THERAPEUTIC PROCD STRG ENDUR: HCPCS

## 2017-12-04 ENCOUNTER — OFFICE VISIT (OUTPATIENT)
Dept: PULMONOLOGY | Age: 82
End: 2017-12-04

## 2017-12-04 VITALS
OXYGEN SATURATION: 97 % | SYSTOLIC BLOOD PRESSURE: 124 MMHG | HEIGHT: 63 IN | BODY MASS INDEX: 27.11 KG/M2 | DIASTOLIC BLOOD PRESSURE: 64 MMHG | WEIGHT: 153 LBS | TEMPERATURE: 96.4 F | HEART RATE: 64 BPM

## 2017-12-04 DIAGNOSIS — I27.20 PULMONARY HTN (HCC): Primary | ICD-10-CM

## 2017-12-04 PROCEDURE — 99214 OFFICE O/P EST MOD 30 MIN: CPT | Performed by: INTERNAL MEDICINE

## 2017-12-04 ASSESSMENT — ENCOUNTER SYMPTOMS
COUGH: 0
CHEST TIGHTNESS: 0
VOMITING: 0
SHORTNESS OF BREATH: 0
SORE THROAT: 0
SINUS PRESSURE: 0
RHINORRHEA: 0
DIARRHEA: 0
NAUSEA: 0
WHEEZING: 0
ABDOMINAL PAIN: 0

## 2017-12-04 NOTE — PROGRESS NOTES
and fever. HENT: Negative for congestion, nosebleeds, postnasal drip, rhinorrhea, sinus pressure, sneezing and sore throat. Eyes: Negative for visual disturbance. Respiratory: Negative for cough, chest tightness, shortness of breath and wheezing. Cardiovascular: Negative for chest pain, palpitations and leg swelling. Gastrointestinal: Negative for abdominal pain, diarrhea, nausea and vomiting. Genitourinary: Negative for dysuria and urgency. Musculoskeletal: Negative for arthralgias, joint swelling and myalgias. Skin: Negative for rash. Allergic/Immunologic: Negative for environmental allergies. Neurological: Negative for tremors, weakness, light-headedness and headaches. Psychiatric/Behavioral: Negative for behavioral problems and sleep disturbance. Objective:     Vitals:    12/04/17 1413   BP: 124/64   Site: Right Arm   Position: Sitting   Cuff Size: Large Adult   Pulse: 64   Temp: 96.4 °F (35.8 °C)   TempSrc: Tympanic   SpO2: 97%   Weight: 153 lb (69.4 kg)   Height: 5' 3\" (1.6 m)         Physical Exam   Constitutional: She is oriented to person, place, and time. She appears well-developed and well-nourished. HENT:   Head: Normocephalic and atraumatic. Mouth/Throat: Oropharynx is clear and moist.   Eyes: EOM are normal. Pupils are equal, round, and reactive to light. Neck: No JVD present. No tracheal deviation present. No thyromegaly present. Cardiovascular: Normal rate and regular rhythm. Exam reveals no gallop. No murmur heard. Pulmonary/Chest: She has no wheezes. She has no rales. She exhibits no tenderness. Abdominal: She exhibits no distension. Musculoskeletal: Normal range of motion. She exhibits no edema. Neurological: She is alert and oriented to person, place, and time. Coordination normal.   Skin: Skin is warm and dry. No rash noted. Psychiatric: She has a normal mood and affect. Assessment:     1.  Pulmonary HTN  ECHO Complete 2D W Doppler W Color     The diagnosis of pulmonary hypertension, the possibility of improving status with use of oxygen therapy at night and with exertion, and possibility of need for treatment in case of progression despite oxygen use were all discussed today at length. For now will continue with oxygen therapy, pulmonary rehabilitation, and repeat an echo in 3 months      Plan:     Orders Placed This Encounter   Procedures    ECHO Complete 2D W Doppler W Color     Standing Status:   Future     Standing Expiration Date:   4/4/2018     Order Specific Question:   Reason for exam:     Answer:   Pulmonary hypertension     No orders of the defined types were placed in this encounter. Return in about 4 months (around 4/4/2018) for re-evaluation, review tests.       Yuliet Felton MD

## 2017-12-05 ENCOUNTER — HOSPITAL ENCOUNTER (OUTPATIENT)
Dept: PULMONOLOGY | Age: 82
Setting detail: THERAPIES SERIES
Discharge: HOME | End: 2017-12-05
Payer: MEDICARE

## 2017-12-05 DIAGNOSIS — I27.20 PULMONARY HTN (HCC): Primary | ICD-10-CM

## 2017-12-05 PROCEDURE — G0237 THERAPEUTIC PROCD STRG ENDUR: HCPCS

## 2017-12-07 ENCOUNTER — HOSPITAL ENCOUNTER (OUTPATIENT)
Dept: PULMONOLOGY | Age: 82
Setting detail: THERAPIES SERIES
Discharge: HOME | End: 2017-12-07
Payer: MEDICARE

## 2017-12-07 PROCEDURE — G0237 THERAPEUTIC PROCD STRG ENDUR: HCPCS

## 2017-12-12 ENCOUNTER — HOSPITAL ENCOUNTER (OUTPATIENT)
Dept: PULMONOLOGY | Age: 82
Setting detail: THERAPIES SERIES
Discharge: HOME | End: 2017-12-12
Payer: MEDICARE

## 2017-12-12 PROCEDURE — G0237 THERAPEUTIC PROCD STRG ENDUR: HCPCS

## 2017-12-19 ENCOUNTER — HOSPITAL ENCOUNTER (OUTPATIENT)
Dept: PULMONOLOGY | Age: 82
Setting detail: THERAPIES SERIES
Discharge: HOME | End: 2017-12-19
Payer: MEDICARE

## 2017-12-19 PROCEDURE — G0237 THERAPEUTIC PROCD STRG ENDUR: HCPCS

## 2017-12-26 ENCOUNTER — HOSPITAL ENCOUNTER (OUTPATIENT)
Dept: PULMONOLOGY | Age: 82
Setting detail: THERAPIES SERIES
Discharge: HOME | End: 2017-12-26
Payer: MEDICARE

## 2017-12-26 PROCEDURE — G0237 THERAPEUTIC PROCD STRG ENDUR: HCPCS

## 2017-12-28 ENCOUNTER — HOSPITAL ENCOUNTER (OUTPATIENT)
Dept: PULMONOLOGY | Age: 82
Setting detail: THERAPIES SERIES
Discharge: HOME | End: 2017-12-28
Payer: MEDICARE

## 2017-12-28 PROCEDURE — G0237 THERAPEUTIC PROCD STRG ENDUR: HCPCS

## 2018-01-04 ENCOUNTER — HOSPITAL ENCOUNTER (OUTPATIENT)
Dept: PULMONOLOGY | Age: 83
Setting detail: THERAPIES SERIES
Discharge: HOME | End: 2018-01-04
Payer: MEDICARE

## 2018-01-04 PROCEDURE — G0237 THERAPEUTIC PROCD STRG ENDUR: HCPCS

## 2018-01-09 ENCOUNTER — HOSPITAL ENCOUNTER (OUTPATIENT)
Dept: PULMONOLOGY | Age: 83
Setting detail: THERAPIES SERIES
Discharge: HOME | End: 2018-01-09
Payer: MEDICARE

## 2018-01-09 PROCEDURE — G0237 THERAPEUTIC PROCD STRG ENDUR: HCPCS

## 2018-01-11 ENCOUNTER — HOSPITAL ENCOUNTER (OUTPATIENT)
Dept: PULMONOLOGY | Age: 83
Setting detail: THERAPIES SERIES
Discharge: HOME | End: 2018-01-11
Payer: MEDICARE

## 2018-01-11 PROCEDURE — G0237 THERAPEUTIC PROCD STRG ENDUR: HCPCS

## 2018-01-18 ENCOUNTER — HOSPITAL ENCOUNTER (OUTPATIENT)
Dept: PULMONOLOGY | Age: 83
Setting detail: THERAPIES SERIES
Discharge: HOME | End: 2018-01-18
Payer: MEDICARE

## 2018-01-18 PROCEDURE — G0237 THERAPEUTIC PROCD STRG ENDUR: HCPCS

## 2018-01-23 ENCOUNTER — HOSPITAL ENCOUNTER (OUTPATIENT)
Dept: PULMONOLOGY | Age: 83
Setting detail: THERAPIES SERIES
Discharge: HOME | End: 2018-01-23
Payer: MEDICARE

## 2018-01-23 PROCEDURE — G0237 THERAPEUTIC PROCD STRG ENDUR: HCPCS

## 2018-01-25 ENCOUNTER — HOSPITAL ENCOUNTER (OUTPATIENT)
Dept: PULMONOLOGY | Age: 83
Setting detail: THERAPIES SERIES
Discharge: HOME | End: 2018-01-25
Payer: MEDICARE

## 2018-01-25 PROCEDURE — G0237 THERAPEUTIC PROCD STRG ENDUR: HCPCS

## 2018-02-01 ENCOUNTER — HOSPITAL ENCOUNTER (OUTPATIENT)
Dept: PULMONOLOGY | Age: 83
Setting detail: THERAPIES SERIES
Discharge: HOME | End: 2018-02-01
Payer: MEDICARE

## 2018-02-01 PROCEDURE — G0237 THERAPEUTIC PROCD STRG ENDUR: HCPCS

## 2018-02-06 ENCOUNTER — HOSPITAL ENCOUNTER (OUTPATIENT)
Dept: PULMONOLOGY | Age: 83
Setting detail: THERAPIES SERIES
Discharge: HOME | End: 2018-02-06
Payer: MEDICARE

## 2018-02-06 PROCEDURE — G0237 THERAPEUTIC PROCD STRG ENDUR: HCPCS

## 2018-02-08 ENCOUNTER — HOSPITAL ENCOUNTER (OUTPATIENT)
Dept: PULMONOLOGY | Age: 83
Setting detail: THERAPIES SERIES
Discharge: HOME | End: 2018-02-08
Payer: MEDICARE

## 2018-02-08 PROCEDURE — G0237 THERAPEUTIC PROCD STRG ENDUR: HCPCS

## 2018-02-13 ENCOUNTER — HOSPITAL ENCOUNTER (OUTPATIENT)
Dept: PULMONOLOGY | Age: 83
Setting detail: THERAPIES SERIES
Discharge: HOME | End: 2018-02-13
Payer: MEDICARE

## 2018-02-13 PROCEDURE — G0237 THERAPEUTIC PROCD STRG ENDUR: HCPCS

## 2018-02-15 ENCOUNTER — HOSPITAL ENCOUNTER (OUTPATIENT)
Dept: PULMONOLOGY | Age: 83
Setting detail: THERAPIES SERIES
Discharge: HOME | End: 2018-02-15
Payer: MEDICARE

## 2018-02-15 PROCEDURE — G0237 THERAPEUTIC PROCD STRG ENDUR: HCPCS

## 2018-02-20 ENCOUNTER — HOSPITAL ENCOUNTER (OUTPATIENT)
Dept: PULMONOLOGY | Age: 83
Setting detail: THERAPIES SERIES
Discharge: HOME | End: 2018-02-20
Payer: MEDICARE

## 2018-02-20 PROCEDURE — G0237 THERAPEUTIC PROCD STRG ENDUR: HCPCS

## 2018-02-22 ENCOUNTER — HOSPITAL ENCOUNTER (OUTPATIENT)
Dept: PULMONOLOGY | Age: 83
Setting detail: THERAPIES SERIES
Discharge: HOME | End: 2018-02-22
Payer: MEDICARE

## 2018-02-22 PROCEDURE — G0237 THERAPEUTIC PROCD STRG ENDUR: HCPCS

## 2018-02-27 ENCOUNTER — HOSPITAL ENCOUNTER (OUTPATIENT)
Dept: PULMONOLOGY | Age: 83
Setting detail: THERAPIES SERIES
Discharge: HOME | End: 2018-02-27
Payer: MEDICARE

## 2018-02-27 PROCEDURE — G0237 THERAPEUTIC PROCD STRG ENDUR: HCPCS

## 2018-03-01 ENCOUNTER — HOSPITAL ENCOUNTER (OUTPATIENT)
Dept: PULMONOLOGY | Age: 83
Setting detail: THERAPIES SERIES
Discharge: HOME | End: 2018-03-01
Payer: MEDICARE

## 2018-03-01 PROCEDURE — G0237 THERAPEUTIC PROCD STRG ENDUR: HCPCS

## 2018-03-02 ENCOUNTER — OFFICE VISIT (OUTPATIENT)
Dept: PULMONOLOGY | Age: 83
End: 2018-03-02
Payer: MEDICARE

## 2018-03-02 ENCOUNTER — TELEPHONE (OUTPATIENT)
Dept: PULMONOLOGY | Age: 83
End: 2018-03-02

## 2018-03-02 VITALS
HEART RATE: 88 BPM | SYSTOLIC BLOOD PRESSURE: 134 MMHG | HEIGHT: 63 IN | OXYGEN SATURATION: 88 % | DIASTOLIC BLOOD PRESSURE: 86 MMHG | WEIGHT: 153 LBS | BODY MASS INDEX: 27.11 KG/M2

## 2018-03-02 DIAGNOSIS — R09.02 HYPOXIA: ICD-10-CM

## 2018-03-02 DIAGNOSIS — I27.20 PULMONARY HYPERTENSION (HCC): Primary | ICD-10-CM

## 2018-03-02 PROCEDURE — 99214 OFFICE O/P EST MOD 30 MIN: CPT | Performed by: PHYSICIAN ASSISTANT

## 2018-03-02 ASSESSMENT — ENCOUNTER SYMPTOMS
SORE THROAT: 0
STRIDOR: 0
TROUBLE SWALLOWING: 0
SINUS PAIN: 0
CHEST TIGHTNESS: 0
SINUS PRESSURE: 0
VOICE CHANGE: 0
COUGH: 0
SHORTNESS OF BREATH: 0
BACK PAIN: 0
RHINORRHEA: 0
ABDOMINAL PAIN: 0
WHEEZING: 0

## 2018-03-02 NOTE — PROGRESS NOTES
 Sulfa Antibiotics Swelling    Lisinopril Other (See Comments)     ACE inhibitors make her cough       Current Outpatient Prescriptions   Medication Sig Dispense Refill    OXYGEN Please supply patient with a portable oxygen concentrator 1 Units 0    aspirin 81 MG EC tablet Take 1 tablet by mouth 2 times daily 60 tablet 0    docusate sodium (COLACE, DULCOLAX) 100 MG CAPS Take 100 mg by mouth 2 times daily 60 capsule 0    Magnesium 400 MG CAPS Take by mouth      Probiotic Product (PROBIOTIC & ACIDOPHILUS EX ST) CAPS Take by mouth      Multiple Vitamins-Minerals (PRESERVISION AREDS 2+MULTI VIT PO) Take by mouth      metoprolol succinate ER (TOPROL XL) 50 MG XL tablet Take one(1) tablet daily.  amLODIPine (NORVASC) 5 MG tablet       chlorthalidone (HYGROTON) 25 MG tablet TAKE 1 TABLET BY MOUTH every other day  3    meloxicam (MOBIC) 15 MG tablet TAKE 1 TABLET BY MOUTH DAILY AS NEEDED  0    pravastatin (PRAVACHOL) 40 MG tablet       vitamin D (CHOLECALCIFEROL) 1000 UNIT TABS tablet Take 1,000 Units by mouth daily      Omega-3 Fatty Acids (FISH OIL PO) Take by mouth      Multiple Vitamins-Minerals (VITEYES AREDS ADVANCED PO) Take by mouth      Nutritional Supplements (JUICE PLUS FIBRE PO) Take by mouth       No current facility-administered medications for this visit. Review of Systems   Constitutional: Negative for activity change, appetite change, fatigue, fever and unexpected weight change. HENT: Negative for postnasal drip, rhinorrhea, sinus pain, sinus pressure, sneezing, sore throat, tinnitus, trouble swallowing and voice change. Eyes: Negative for visual disturbance. Respiratory: Negative for cough, chest tightness, shortness of breath, wheezing and stridor. Cardiovascular: Negative for chest pain and leg swelling. Gastrointestinal: Negative for abdominal pain. Musculoskeletal: Negative for arthralgias and back pain. Skin: Negative for rash and wound.

## 2018-03-02 NOTE — TELEPHONE ENCOUNTER
Pt was in for an appointment today and was told to call back with the kind of concentrator she wants. Its Inogen 1 G4.

## 2018-03-05 ENCOUNTER — HOSPITAL ENCOUNTER (OUTPATIENT)
Dept: NON INVASIVE DIAGNOSTICS | Age: 83
Discharge: HOME | End: 2018-03-05
Payer: MEDICARE

## 2018-03-05 DIAGNOSIS — I27.20 PULMONARY HTN (HCC): ICD-10-CM

## 2018-03-05 LAB
LV EF: 60 %
LVEF MODALITY: NORMAL

## 2018-03-05 PROCEDURE — 93306 TTE W/DOPPLER COMPLETE: CPT

## 2018-03-06 ENCOUNTER — HOSPITAL ENCOUNTER (OUTPATIENT)
Dept: PULMONOLOGY | Age: 83
Setting detail: THERAPIES SERIES
Discharge: HOME | End: 2018-03-06
Payer: MEDICARE

## 2018-03-06 PROCEDURE — G0237 THERAPEUTIC PROCD STRG ENDUR: HCPCS

## 2018-03-08 ENCOUNTER — HOSPITAL ENCOUNTER (OUTPATIENT)
Dept: PULMONOLOGY | Age: 83
Setting detail: THERAPIES SERIES
Discharge: HOME | End: 2018-03-08
Payer: MEDICARE

## 2018-03-08 PROCEDURE — G0237 THERAPEUTIC PROCD STRG ENDUR: HCPCS

## 2018-03-13 ENCOUNTER — HOSPITAL ENCOUNTER (OUTPATIENT)
Dept: PULMONOLOGY | Age: 83
Setting detail: THERAPIES SERIES
Discharge: HOME | End: 2018-03-13
Payer: MEDICARE

## 2018-03-13 ENCOUNTER — OFFICE VISIT (OUTPATIENT)
Dept: FAMILY MEDICINE CLINIC | Age: 83
End: 2018-03-13
Payer: MEDICARE

## 2018-03-13 ENCOUNTER — HOSPITAL ENCOUNTER (OUTPATIENT)
Dept: GENERAL RADIOLOGY | Age: 83
Discharge: HOME | End: 2018-03-15
Payer: MEDICARE

## 2018-03-13 VITALS
SYSTOLIC BLOOD PRESSURE: 116 MMHG | DIASTOLIC BLOOD PRESSURE: 72 MMHG | TEMPERATURE: 98.4 F | OXYGEN SATURATION: 96 % | WEIGHT: 154 LBS | BODY MASS INDEX: 27.28 KG/M2 | HEART RATE: 67 BPM

## 2018-03-13 DIAGNOSIS — R05.9 COUGH: ICD-10-CM

## 2018-03-13 DIAGNOSIS — R05.9 COUGH: Primary | ICD-10-CM

## 2018-03-13 PROCEDURE — 71046 X-RAY EXAM CHEST 2 VIEWS: CPT

## 2018-03-13 PROCEDURE — G0237 THERAPEUTIC PROCD STRG ENDUR: HCPCS

## 2018-03-13 PROCEDURE — 99213 OFFICE O/P EST LOW 20 MIN: CPT | Performed by: NURSE PRACTITIONER

## 2018-03-13 ASSESSMENT — ENCOUNTER SYMPTOMS
RHINORRHEA: 0
SHORTNESS OF BREATH: 1
WHEEZING: 0
SORE THROAT: 1
COUGH: 1

## 2018-03-15 ENCOUNTER — HOSPITAL ENCOUNTER (OUTPATIENT)
Dept: PULMONOLOGY | Age: 83
Setting detail: THERAPIES SERIES
Discharge: HOME | End: 2018-03-15
Payer: MEDICARE

## 2018-03-15 PROCEDURE — G0237 THERAPEUTIC PROCD STRG ENDUR: HCPCS

## 2018-03-20 ENCOUNTER — HOSPITAL ENCOUNTER (OUTPATIENT)
Dept: PULMONOLOGY | Age: 83
Setting detail: THERAPIES SERIES
Discharge: HOME | End: 2018-03-20
Payer: MEDICARE

## 2018-03-20 PROCEDURE — G0237 THERAPEUTIC PROCD STRG ENDUR: HCPCS

## 2018-03-21 ENCOUNTER — TELEPHONE (OUTPATIENT)
Dept: PULMONOLOGY | Age: 83
End: 2018-03-21

## 2018-03-21 DIAGNOSIS — I27.20 PULMONARY HYPERTENSION (HCC): ICD-10-CM

## 2018-03-21 DIAGNOSIS — R09.02 HYPOXIA: ICD-10-CM

## 2018-03-22 ENCOUNTER — HOSPITAL ENCOUNTER (OUTPATIENT)
Dept: PULMONOLOGY | Age: 83
Setting detail: THERAPIES SERIES
Discharge: HOME | End: 2018-03-22
Payer: MEDICARE

## 2018-03-22 PROCEDURE — G0237 THERAPEUTIC PROCD STRG ENDUR: HCPCS

## 2018-03-26 ENCOUNTER — OFFICE VISIT (OUTPATIENT)
Dept: FAMILY MEDICINE CLINIC | Age: 83
End: 2018-03-26
Payer: MEDICARE

## 2018-03-26 VITALS
DIASTOLIC BLOOD PRESSURE: 60 MMHG | HEART RATE: 66 BPM | RESPIRATION RATE: 20 BRPM | BODY MASS INDEX: 27.46 KG/M2 | HEIGHT: 63 IN | SYSTOLIC BLOOD PRESSURE: 126 MMHG | OXYGEN SATURATION: 95 % | WEIGHT: 155 LBS | TEMPERATURE: 98 F

## 2018-03-26 DIAGNOSIS — E78.00 PURE HYPERCHOLESTEROLEMIA: ICD-10-CM

## 2018-03-26 DIAGNOSIS — I10 ESSENTIAL HYPERTENSION: Primary | Chronic | ICD-10-CM

## 2018-03-26 DIAGNOSIS — M81.0 AGE-RELATED OSTEOPOROSIS WITHOUT CURRENT PATHOLOGICAL FRACTURE: ICD-10-CM

## 2018-03-26 DIAGNOSIS — J98.4 PULMONARY DISORDER: Chronic | ICD-10-CM

## 2018-03-26 DIAGNOSIS — R73.09 ABNORMAL GLUCOSE: ICD-10-CM

## 2018-03-26 PROBLEM — H54.8 LEGAL BLINDNESS: Status: ACTIVE | Noted: 2018-03-23

## 2018-03-26 PROBLEM — Z96.1 PSEUDOPHAKIA: Status: ACTIVE | Noted: 2018-03-23

## 2018-03-26 PROBLEM — H35.3133 NONEXUDATIVE AGE-RELATED MACULAR DEGENERATION, BILATERAL, ADVANCED ATROPHIC WITHOUT SUBFOVEAL INVOLVEMENT: Status: ACTIVE | Noted: 2018-03-23

## 2018-03-26 PROBLEM — H53.413: Status: ACTIVE | Noted: 2018-03-23

## 2018-03-26 PROCEDURE — 99204 OFFICE O/P NEW MOD 45 MIN: CPT | Performed by: FAMILY MEDICINE

## 2018-03-26 RX ORDER — CHLORTHALIDONE 25 MG/1
TABLET ORAL
COMMUNITY
Start: 2018-02-18 | End: 2018-03-26

## 2018-03-26 RX ORDER — POTASSIUM CHLORIDE 20 MEQ/1
20 TABLET, EXTENDED RELEASE ORAL DAILY
COMMUNITY
Start: 2017-12-04

## 2018-03-26 RX ORDER — PRAVASTATIN SODIUM 40 MG
TABLET ORAL
COMMUNITY
Start: 2017-12-26 | End: 2018-03-26

## 2018-03-26 RX ORDER — ANTACID TABLETS 648 MG/1
1 TABLET, CHEWABLE ORAL 2 TIMES DAILY
Qty: 60 TABLET | Refills: 11 | Status: SHIPPED | OUTPATIENT
Start: 2018-03-26 | End: 2019-03-26

## 2018-03-26 RX ORDER — AMLODIPINE BESYLATE 5 MG/1
TABLET ORAL
COMMUNITY
Start: 2017-11-29 | End: 2018-03-26

## 2018-03-26 ASSESSMENT — PATIENT HEALTH QUESTIONNAIRE - PHQ9
SUM OF ALL RESPONSES TO PHQ QUESTIONS 1-9: 0
2. FEELING DOWN, DEPRESSED OR HOPELESS: 0
1. LITTLE INTEREST OR PLEASURE IN DOING THINGS: 0
SUM OF ALL RESPONSES TO PHQ9 QUESTIONS 1 & 2: 0

## 2018-03-26 NOTE — PROGRESS NOTES
reviewed. Objective    Vitals:    03/26/18 1317   BP: 126/60   Site: Left Arm   Position: Sitting   Cuff Size: Medium Adult   Pulse: 66   Resp: 20   Temp: 98 °F (36.7 °C)   TempSrc: Temporal   SpO2: 95%   Weight: 155 lb (70.3 kg)   Height: 5' 3\" (1.6 m)       Physical Exam   Constitutional: She is oriented to person, place, and time. She appears well-developed and well-nourished. HENT:   Head: Normocephalic and atraumatic. Eyes: Conjunctivae are normal. No scleral icterus. Neck: Neck supple. Carotid bruit is not present. No thyromegaly present. Cardiovascular: Normal rate, regular rhythm, S1 normal, S2 normal, normal heart sounds and intact distal pulses. Pulmonary/Chest: Effort normal and breath sounds normal. She has no wheezes. She has no rales. Abdominal: Soft. Bowel sounds are normal. She exhibits no distension and no mass. There is no tenderness. Musculoskeletal: She exhibits no edema. Lymphadenopathy:     She has no cervical adenopathy. Neurological: She is alert and oriented to person, place, and time. Skin: Skin is warm and dry. Psychiatric: She has a normal mood and affect. Assessment & Plan   1. Essential hypertension  Comprehensive Metabolic Panel    Lipid, Fasting    TSH Without Reflex   2. Pure hypercholesterolemia  Comprehensive Metabolic Panel    Lipid, Fasting   3. Pulmonary disorder     4. Abnormal glucose  Hemoglobin A1C   5. Age-related osteoporosis without current pathological fracture  calcium carbonate 648 MG TABS     1. Hypertension - stable, well-controlled; continue current meds; follow labs; reviewed healthy diet, exercise, importance of maintaining healthy weight and med compliance. 2. Pulmonary d/o, not COPD - improving, fairly controlled; continue current meds; follow labs; reviewed healthy diet, exercise, importance of maintaining healthy weight and med compliance. Has pulm f/u.     3.HLD - stable, well-controlled; continue current meds though discuss lowering dose with cardiologist given age; follow labs; reviewed healthy diet, exercise, importance of maintaining healthy weight and med compliance. 4. Abnl glucose - A1C ordered. 5. Osteoporosis - added calcium 650 BID to Keke D and prolia. May need Colace. Reviewed with the patient: current clinical status, medications, activities and diet.      Side effects, adverse effects of the medication prescribed today, as well as treatment plan/ rationale and result expectations have been discussed with the patient who expresses understanding and desires to proceed.     Close follow up to evaluate treatment results and for coordination of care. I have reviewed the patient's medical history in detail and updated the computerized patient record. Orders Placed This Encounter   Procedures    Hemoglobin A1C     Standing Status:   Future     Standing Expiration Date:   3/26/2019    Comprehensive Metabolic Panel     Standing Status:   Future     Standing Expiration Date:   3/26/2019    Lipid, Fasting     Standing Status:   Future     Standing Expiration Date:   3/26/2019    TSH Without Reflex     Standing Status:   Future     Standing Expiration Date:   3/26/2019     Orders Placed This Encounter   Medications    calcium carbonate 648 MG TABS     Sig: Take 1 tablet by mouth 2 times daily     Dispense:  60 tablet     Refill:  11     Medications Discontinued During This Encounter   Medication Reason    pravastatin (PRAVACHOL) 40 MG tablet     chlorthalidone (HYGROTON) 25 MG tablet     amLODIPine (NORVASC) 5 MG tablet     Nutritional Supplements (JUICE PLUS FIBRE PO) Therapy completed    meloxicam (MOBIC) 15 MG tablet Therapy completed    docusate sodium (COLACE, DULCOLAX) 100 MG CAPS Therapy completed    Multiple Vitamins-Minerals (VITEYES AREDS ADVANCED PO) Duplicate Order     Return in about 3 months (around 6/26/2018) for HTN, HLD.         Controlled Substances Monitoring: German Goss MD

## 2018-03-27 ENCOUNTER — HOSPITAL ENCOUNTER (OUTPATIENT)
Dept: PULMONOLOGY | Age: 83
Setting detail: THERAPIES SERIES
Discharge: HOME | End: 2018-03-27
Payer: MEDICARE

## 2018-03-27 DIAGNOSIS — E78.00 PURE HYPERCHOLESTEROLEMIA: ICD-10-CM

## 2018-03-27 DIAGNOSIS — I10 ESSENTIAL HYPERTENSION: Chronic | ICD-10-CM

## 2018-03-27 DIAGNOSIS — R73.09 ABNORMAL GLUCOSE: ICD-10-CM

## 2018-03-27 LAB
ALBUMIN SERPL-MCNC: 4.3 G/DL (ref 3.9–4.9)
ALP BLD-CCNC: 99 U/L (ref 40–130)
ALT SERPL-CCNC: 14 U/L (ref 0–33)
ANION GAP SERPL CALCULATED.3IONS-SCNC: 15 MEQ/L (ref 7–13)
AST SERPL-CCNC: 18 U/L (ref 0–35)
BILIRUB SERPL-MCNC: 0.6 MG/DL (ref 0–1.2)
BUN BLDV-MCNC: 29 MG/DL (ref 8–23)
CALCIUM SERPL-MCNC: 10.1 MG/DL (ref 8.6–10.2)
CHLORIDE BLD-SCNC: 97 MEQ/L (ref 98–107)
CHOLESTEROL, FASTING: 170 MG/DL (ref 0–199)
CO2: 28 MEQ/L (ref 22–29)
CREAT SERPL-MCNC: 0.7 MG/DL (ref 0.5–0.9)
GFR AFRICAN AMERICAN: >60
GFR NON-AFRICAN AMERICAN: >60
GLOBULIN: 2.9 G/DL (ref 2.3–3.5)
GLUCOSE BLD-MCNC: 135 MG/DL (ref 74–109)
HBA1C MFR BLD: 6.1 % (ref 4.8–5.9)
HDLC SERPL-MCNC: 36 MG/DL (ref 40–59)
LDL CHOLESTEROL CALCULATED: 74 MG/DL (ref 0–129)
POTASSIUM SERPL-SCNC: 4.2 MEQ/L (ref 3.5–5.1)
SODIUM BLD-SCNC: 140 MEQ/L (ref 132–144)
TOTAL PROTEIN: 7.2 G/DL (ref 6.4–8.1)
TRIGLYCERIDE, FASTING: 299 MG/DL (ref 0–200)
TSH SERPL DL<=0.05 MIU/L-ACNC: 1.63 UIU/ML (ref 0.27–4.2)

## 2018-03-27 PROCEDURE — G0237 THERAPEUTIC PROCD STRG ENDUR: HCPCS

## 2018-03-29 ENCOUNTER — HOSPITAL ENCOUNTER (OUTPATIENT)
Dept: PULMONOLOGY | Age: 83
Setting detail: THERAPIES SERIES
Discharge: HOME | End: 2018-03-29
Payer: MEDICARE

## 2018-03-29 PROCEDURE — G0237 THERAPEUTIC PROCD STRG ENDUR: HCPCS

## 2018-04-03 ENCOUNTER — HOSPITAL ENCOUNTER (OUTPATIENT)
Dept: PULMONOLOGY | Age: 83
Setting detail: THERAPIES SERIES
Discharge: HOME | End: 2018-04-03
Payer: MEDICARE

## 2018-04-03 PROCEDURE — G0237 THERAPEUTIC PROCD STRG ENDUR: HCPCS

## 2018-04-09 ENCOUNTER — OFFICE VISIT (OUTPATIENT)
Dept: PULMONOLOGY | Age: 83
End: 2018-04-09
Payer: MEDICARE

## 2018-04-09 VITALS
RESPIRATION RATE: 16 BRPM | WEIGHT: 154 LBS | HEIGHT: 63 IN | HEART RATE: 76 BPM | TEMPERATURE: 96.2 F | SYSTOLIC BLOOD PRESSURE: 130 MMHG | DIASTOLIC BLOOD PRESSURE: 60 MMHG | OXYGEN SATURATION: 96 % | BODY MASS INDEX: 27.29 KG/M2

## 2018-04-09 DIAGNOSIS — I27.20 PULMONARY HYPERTENSION (HCC): Primary | ICD-10-CM

## 2018-04-09 PROCEDURE — 99214 OFFICE O/P EST MOD 30 MIN: CPT | Performed by: INTERNAL MEDICINE

## 2018-04-09 ASSESSMENT — ENCOUNTER SYMPTOMS
WHEEZING: 0
COUGH: 0
SHORTNESS OF BREATH: 1
SINUS PRESSURE: 0
DIARRHEA: 0
ABDOMINAL PAIN: 0
CONSTIPATION: 0
RHINORRHEA: 0
BLOOD IN STOOL: 0
BACK PAIN: 0
CHEST TIGHTNESS: 0

## 2018-10-09 ENCOUNTER — OFFICE VISIT (OUTPATIENT)
Dept: PULMONOLOGY | Age: 83
End: 2018-10-09
Payer: MEDICARE

## 2018-10-09 VITALS
DIASTOLIC BLOOD PRESSURE: 60 MMHG | HEIGHT: 63 IN | WEIGHT: 158 LBS | BODY MASS INDEX: 28 KG/M2 | HEART RATE: 70 BPM | SYSTOLIC BLOOD PRESSURE: 114 MMHG | OXYGEN SATURATION: 95 % | TEMPERATURE: 97.4 F

## 2018-10-09 DIAGNOSIS — I27.20 PULMONARY HYPERTENSION (HCC): Primary | ICD-10-CM

## 2018-10-09 DIAGNOSIS — J30.9 ALLERGIC RHINITIS, UNSPECIFIED SEASONALITY, UNSPECIFIED TRIGGER: ICD-10-CM

## 2018-10-09 PROCEDURE — 99213 OFFICE O/P EST LOW 20 MIN: CPT | Performed by: INTERNAL MEDICINE

## 2018-10-09 ASSESSMENT — ENCOUNTER SYMPTOMS
SINUS PRESSURE: 0
SORE THROAT: 0
CHEST TIGHTNESS: 0
SHORTNESS OF BREATH: 0
DIARRHEA: 0
COUGH: 1
NAUSEA: 0
VOMITING: 0
ABDOMINAL PAIN: 0
RHINORRHEA: 0
WHEEZING: 0

## 2018-11-26 ENCOUNTER — CARE COORDINATION (OUTPATIENT)
Dept: CARE COORDINATION | Age: 83
End: 2018-11-26

## 2019-04-18 ENCOUNTER — OFFICE VISIT (OUTPATIENT)
Dept: PULMONOLOGY | Age: 84
End: 2019-04-18
Payer: MEDICARE

## 2019-04-18 VITALS
RESPIRATION RATE: 16 BRPM | HEART RATE: 70 BPM | HEIGHT: 63 IN | SYSTOLIC BLOOD PRESSURE: 134 MMHG | BODY MASS INDEX: 28.46 KG/M2 | TEMPERATURE: 96.4 F | DIASTOLIC BLOOD PRESSURE: 64 MMHG | OXYGEN SATURATION: 89 % | WEIGHT: 160.6 LBS

## 2019-04-18 DIAGNOSIS — J30.9 ALLERGIC RHINITIS, UNSPECIFIED SEASONALITY, UNSPECIFIED TRIGGER: ICD-10-CM

## 2019-04-18 DIAGNOSIS — R09.02 HYPOXIA: ICD-10-CM

## 2019-04-18 DIAGNOSIS — I27.20 PULMONARY HYPERTENSION (HCC): Primary | ICD-10-CM

## 2019-04-18 PROCEDURE — 99214 OFFICE O/P EST MOD 30 MIN: CPT | Performed by: INTERNAL MEDICINE

## 2019-04-18 ASSESSMENT — ENCOUNTER SYMPTOMS
VOMITING: 0
RHINORRHEA: 0
NAUSEA: 0
WHEEZING: 0
COUGH: 0
SHORTNESS OF BREATH: 1
DIARRHEA: 0
CHEST TIGHTNESS: 0
ABDOMINAL PAIN: 0
SORE THROAT: 0
SINUS PRESSURE: 0

## 2019-04-18 NOTE — PROGRESS NOTES
Subjective:     Terese Morrell is a 80 y.o. female who complains today of:     Chief Complaint   Patient presents with    Follow-up     6 month f/u for pulmonary hypertension       HPI  Patient is here for a follow-up visit regarding pulmonary hypertension associated with exertional dyspnea and hypoxia. Since last visit patient reports no change in her respiratory status. She has no breathing difficulties at rest but gets somewhat dyspneic with exertion. She reports no increase in her level of exertional dyspnea. She remains very active and remains noncompliant with use of oxygen on exertion. She does use her oxygen at night. Allergies:  Bisphosphonates; Sulfa antibiotics; and Lisinopril  Past Medical History:   Diagnosis Date    HTN (hypertension) 9/7/2017    Hyperlipidemia     Hypertension     Macular degeneration     Osteoporosis     PONV (postoperative nausea and vomiting)     Pulmonary hypertension (HCC)     Right knee DJD 9/7/2017     Past Surgical History:   Procedure Laterality Date    APPENDECTOMY      HYSTERECTOMY      OTHER SURGICAL HISTORY Right 08/23/2016    EXCISION UPPER BACK MASS    UT TOTAL KNEE ARTHROPLASTY Right 9/15/2017    RIGHT  KNEE TOTAL ARTHROPLASTY performed by Dom Luna MD at [facility] [place]     Family History   Problem Relation Age of Onset    Heart Disease Mother     Heart Disease Father     Cancer Other         breast     Social History     Socioeconomic History    Marital status:       Spouse name: Not on file    Number of children: Not on file    Years of education: Not on file    Highest education level: Not on file   Occupational History    Not on file   Social Needs    Financial resource strain: Not on file    Food insecurity:     Worry: Not on file     Inability: Not on file    Transportation needs:     Medical: Not on file     Non-medical: Not on file   Tobacco Use    Smoking status: Former Smoker     Last attempt to quit: 1/1/1986 Years since quittin.3    Smokeless tobacco: Never Used   Substance and Sexual Activity    Alcohol use: No    Drug use: No    Sexual activity: Not on file   Lifestyle    Physical activity:     Days per week: Not on file     Minutes per session: Not on file    Stress: Not on file   Relationships    Social connections:     Talks on phone: Not on file     Gets together: Not on file     Attends Gnosticist service: Not on file     Active member of club or organization: Not on file     Attends meetings of clubs or organizations: Not on file     Relationship status: Not on file    Intimate partner violence:     Fear of current or ex partner: Not on file     Emotionally abused: Not on file     Physically abused: Not on file     Forced sexual activity: Not on file   Other Topics Concern    Not on file   Social History Narrative    Not on file         Review of Systems   Constitutional: Positive for fatigue. Negative for appetite change, chills, diaphoresis and fever. HENT: Negative for congestion, nosebleeds, postnasal drip, rhinorrhea, sinus pressure, sneezing and sore throat. Eyes: Negative for visual disturbance. Respiratory: Positive for shortness of breath. Negative for cough, chest tightness and wheezing. Cardiovascular: Positive for leg swelling. Negative for chest pain and palpitations. Gastrointestinal: Negative for abdominal pain, diarrhea, nausea and vomiting. Genitourinary: Negative for dysuria and urgency. Musculoskeletal: Negative for arthralgias, joint swelling and myalgias. Skin: Negative for rash. Allergic/Immunologic: Negative for environmental allergies. Neurological: Negative for tremors, weakness, light-headedness and headaches. Psychiatric/Behavioral: Positive for sleep disturbance. Negative for behavioral problems.          Objective:     Vitals:    19 1320   BP: 134/64   Site: Left Upper Arm   Position: Sitting   Cuff Size: Medium Adult   Pulse: 70   Resp: 16 Temp: 96.4 °F (35.8 °C)   TempSrc: Tympanic   SpO2: (!) 89%   Weight: 160 lb 9.6 oz (72.8 kg)   Height: 5' 3\" (1.6 m)         Physical Exam   Constitutional: She is oriented to person, place, and time. She appears well-developed and well-nourished. HENT:   Head: Normocephalic and atraumatic. Mouth/Throat: Oropharynx is clear and moist.   Eyes: Pupils are equal, round, and reactive to light. EOM are normal.   Neck: No JVD present. No tracheal deviation present. No thyromegaly present. Cardiovascular: Normal rate and regular rhythm. Exam reveals no gallop. No murmur heard. Pulmonary/Chest: She has no wheezes. She has no rales. She exhibits no tenderness. Abdominal: She exhibits no distension. Musculoskeletal: Normal range of motion. She exhibits no edema. Neurological: She is alert and oriented to person, place, and time. Coordination normal.   Skin: Skin is warm and dry. No rash noted. Psychiatric: She has a normal mood and affect. Assessment:      Diagnosis Orders   1. Pulmonary hypertension ([address]     2. Allergic rhinitis, unspecified seasonality, unspecified trigger     3. Hypoxia       Discussed the importance of oxygen use on exertion again today. She has been very resistant to the idea and she remains not interested in initiating oxygen use with ambulation or exertion. Generally she is stable, the possibility of progression with persistent hypoxia was discussed      Plan:     No orders of the defined types were placed in this encounter. No orders of the defined types were placed in this encounter. Return in about 6 months (around 10/18/2019) for re-evaluation.        Juan Manuel Bhagat MD

## 2021-08-13 ENCOUNTER — APPOINTMENT (OUTPATIENT)
Dept: GENERAL RADIOLOGY | Age: 86
End: 2021-08-13
Payer: MEDICARE

## 2021-08-13 ENCOUNTER — APPOINTMENT (OUTPATIENT)
Dept: CT IMAGING | Age: 86
End: 2021-08-13
Payer: MEDICARE

## 2021-08-13 ENCOUNTER — APPOINTMENT (OUTPATIENT)
Dept: INFUSION THERAPY | Age: 86
End: 2021-08-13
Payer: MEDICARE

## 2021-08-13 ENCOUNTER — HOSPITAL ENCOUNTER (OUTPATIENT)
Dept: INFUSION THERAPY | Age: 86
Setting detail: INFUSION SERIES
Discharge: HOME | End: 2021-08-13
Payer: MEDICARE

## 2021-08-13 ENCOUNTER — HOSPITAL ENCOUNTER (EMERGENCY)
Age: 86
Discharge: HOME | End: 2021-08-13
Attending: EMERGENCY MEDICINE
Payer: MEDICARE

## 2021-08-13 VITALS
DIASTOLIC BLOOD PRESSURE: 64 MMHG | OXYGEN SATURATION: 96 % | TEMPERATURE: 98.3 F | HEART RATE: 75 BPM | RESPIRATION RATE: 18 BRPM | SYSTOLIC BLOOD PRESSURE: 112 MMHG

## 2021-08-13 VITALS
DIASTOLIC BLOOD PRESSURE: 48 MMHG | WEIGHT: 140 LBS | HEART RATE: 79 BPM | OXYGEN SATURATION: 94 % | HEIGHT: 63 IN | BODY MASS INDEX: 24.8 KG/M2 | SYSTOLIC BLOOD PRESSURE: 109 MMHG | RESPIRATION RATE: 18 BRPM | TEMPERATURE: 99.6 F

## 2021-08-13 DIAGNOSIS — U07.1 COVID-19: Primary | ICD-10-CM

## 2021-08-13 DIAGNOSIS — U07.1 COVID-19: ICD-10-CM

## 2021-08-13 LAB
ALBUMIN SERPL-MCNC: 3.5 G/DL (ref 3.5–4.6)
ALP BLD-CCNC: 73 U/L (ref 40–130)
ALT SERPL-CCNC: 17 U/L (ref 0–33)
ANION GAP SERPL CALCULATED.3IONS-SCNC: 16 MEQ/L (ref 9–15)
AST SERPL-CCNC: 34 U/L (ref 0–35)
BACTERIA: NEGATIVE /HPF
BASOPHILS ABSOLUTE: 0 K/UL (ref 0–0.2)
BASOPHILS RELATIVE PERCENT: 0.4 %
BILIRUB SERPL-MCNC: 0.4 MG/DL (ref 0.2–0.7)
BILIRUBIN URINE: NEGATIVE
BLOOD, URINE: NEGATIVE
BUN BLDV-MCNC: 39 MG/DL (ref 8–23)
CALCIUM SERPL-MCNC: 8.5 MG/DL (ref 8.5–9.9)
CHLORIDE BLD-SCNC: 94 MEQ/L (ref 95–107)
CLARITY: CLEAR
CO2: 25 MEQ/L (ref 20–31)
COLOR: YELLOW
CREAT SERPL-MCNC: 1.1 MG/DL (ref 0.5–0.9)
EOSINOPHILS ABSOLUTE: 0 K/UL (ref 0–0.7)
EOSINOPHILS RELATIVE PERCENT: 0.1 %
EPITHELIAL CELLS, UA: NORMAL /HPF (ref 0–5)
GFR AFRICAN AMERICAN: 56.6
GFR NON-AFRICAN AMERICAN: 46.8
GLOBULIN: 3.1 G/DL (ref 2.3–3.5)
GLUCOSE BLD-MCNC: 139 MG/DL (ref 70–99)
GLUCOSE URINE: NEGATIVE MG/DL
HCT VFR BLD CALC: 43.4 % (ref 37–47)
HEMOGLOBIN: 14.7 G/DL (ref 12–16)
HYALINE CASTS: NORMAL /HPF (ref 0–5)
INR BLD: 1
KETONES, URINE: NEGATIVE MG/DL
LEUKOCYTE ESTERASE, URINE: ABNORMAL
LYMPHOCYTES ABSOLUTE: 0.4 K/UL (ref 1–4.8)
LYMPHOCYTES RELATIVE PERCENT: 8.2 %
MAGNESIUM: 1.5 MG/DL (ref 1.7–2.4)
MCH RBC QN AUTO: 30.5 PG (ref 27–31.3)
MCHC RBC AUTO-ENTMCNC: 33.8 % (ref 33–37)
MCV RBC AUTO: 90.3 FL (ref 82–100)
MONOCYTES ABSOLUTE: 0.3 K/UL (ref 0.2–0.8)
MONOCYTES RELATIVE PERCENT: 6.1 %
NEUTROPHILS ABSOLUTE: 4.6 K/UL (ref 1.4–6.5)
NEUTROPHILS RELATIVE PERCENT: 85.2 %
NITRITE, URINE: NEGATIVE
PDW BLD-RTO: 12.7 % (ref 11.5–14.5)
PH UA: 5 (ref 5–9)
PLATELET # BLD: 148 K/UL (ref 130–400)
POTASSIUM SERPL-SCNC: 3.9 MEQ/L (ref 3.4–4.9)
PROTEIN UA: 30 MG/DL
PROTHROMBIN TIME: 13.6 SEC (ref 12.3–14.9)
RBC # BLD: 4.81 M/UL (ref 4.2–5.4)
RBC UA: NORMAL /HPF (ref 0–5)
SARS-COV-2, NAAT: DETECTED
SODIUM BLD-SCNC: 135 MEQ/L (ref 135–144)
SPECIFIC GRAVITY UA: 1.01 (ref 1–1.03)
TOTAL PROTEIN: 6.6 G/DL (ref 6.3–8)
TROPONIN: <0.01 NG/ML (ref 0–0.01)
URINE REFLEX TO CULTURE: ABNORMAL
UROBILINOGEN, URINE: 0.2 E.U./DL
WBC # BLD: 5.4 K/UL (ref 4.8–10.8)
WBC UA: NORMAL /HPF (ref 0–5)

## 2021-08-13 PROCEDURE — 80053 COMPREHEN METABOLIC PANEL: CPT

## 2021-08-13 PROCEDURE — 71045 X-RAY EXAM CHEST 1 VIEW: CPT

## 2021-08-13 PROCEDURE — 6360000002 HC RX W HCPCS: Performed by: EMERGENCY MEDICINE

## 2021-08-13 PROCEDURE — 2580000003 HC RX 258

## 2021-08-13 PROCEDURE — 6360000004 HC RX CONTRAST MEDICATION: Performed by: EMERGENCY MEDICINE

## 2021-08-13 PROCEDURE — 71275 CT ANGIOGRAPHY CHEST: CPT

## 2021-08-13 PROCEDURE — 87635 SARS-COV-2 COVID-19 AMP PRB: CPT

## 2021-08-13 PROCEDURE — M0243 CASIRIVI AND IMDEVI INFUSION: HCPCS

## 2021-08-13 PROCEDURE — 2500000003 HC RX 250 WO HCPCS: Performed by: EMERGENCY MEDICINE

## 2021-08-13 PROCEDURE — 2580000003 HC RX 258: Performed by: EMERGENCY MEDICINE

## 2021-08-13 PROCEDURE — 36415 COLL VENOUS BLD VENIPUNCTURE: CPT

## 2021-08-13 PROCEDURE — 84484 ASSAY OF TROPONIN QUANT: CPT

## 2021-08-13 PROCEDURE — 85025 COMPLETE CBC W/AUTO DIFF WBC: CPT

## 2021-08-13 PROCEDURE — 81001 URINALYSIS AUTO W/SCOPE: CPT

## 2021-08-13 PROCEDURE — 6370000000 HC RX 637 (ALT 250 FOR IP): Performed by: EMERGENCY MEDICINE

## 2021-08-13 PROCEDURE — 87040 BLOOD CULTURE FOR BACTERIA: CPT

## 2021-08-13 PROCEDURE — 83735 ASSAY OF MAGNESIUM: CPT

## 2021-08-13 PROCEDURE — 85610 PROTHROMBIN TIME: CPT

## 2021-08-13 RX ORDER — 0.9 % SODIUM CHLORIDE 0.9 %
1000 INTRAVENOUS SOLUTION INTRAVENOUS ONCE
Status: COMPLETED | OUTPATIENT
Start: 2021-08-13 | End: 2021-08-13

## 2021-08-13 RX ORDER — DEXAMETHASONE SODIUM PHOSPHATE 10 MG/ML
6 INJECTION INTRAMUSCULAR; INTRAVENOUS ONCE
Status: COMPLETED | OUTPATIENT
Start: 2021-08-13 | End: 2021-08-13

## 2021-08-13 RX ORDER — SODIUM CHLORIDE 9 MG/ML
INJECTION, SOLUTION INTRAVENOUS
Status: COMPLETED
Start: 2021-08-13 | End: 2021-08-13

## 2021-08-13 RX ORDER — SODIUM CHLORIDE 0.9 % (FLUSH) 0.9 %
10 SYRINGE (ML) INJECTION 2 TIMES DAILY
Status: DISCONTINUED | OUTPATIENT
Start: 2021-08-13 | End: 2021-08-13 | Stop reason: HOSPADM

## 2021-08-13 RX ORDER — MAGNESIUM SULFATE IN WATER 40 MG/ML
2000 INJECTION, SOLUTION INTRAVENOUS ONCE
Status: COMPLETED | OUTPATIENT
Start: 2021-08-13 | End: 2021-08-13

## 2021-08-13 RX ORDER — BUDESONIDE AND FORMOTEROL FUMARATE DIHYDRATE 160; 4.5 UG/1; UG/1
2 AEROSOL RESPIRATORY (INHALATION) 2 TIMES DAILY
Qty: 3 INHALER | Refills: 1 | Status: SHIPPED | OUTPATIENT
Start: 2021-08-13 | End: 2021-10-13 | Stop reason: ALTCHOICE

## 2021-08-13 RX ORDER — ACETAMINOPHEN 500 MG
1000 TABLET ORAL ONCE
Status: COMPLETED | OUTPATIENT
Start: 2021-08-13 | End: 2021-08-13

## 2021-08-13 RX ORDER — DEXAMETHASONE 6 MG/1
6 TABLET ORAL
Qty: 7 TABLET | Refills: 0 | Status: ON HOLD | OUTPATIENT
Start: 2021-08-13 | End: 2021-08-26

## 2021-08-13 RX ADMIN — ACETAMINOPHEN 1000 MG: 500 TABLET ORAL at 11:18

## 2021-08-13 RX ADMIN — SODIUM CHLORIDE 250 ML: 9 INJECTION, SOLUTION INTRAVENOUS at 14:35

## 2021-08-13 RX ADMIN — SODIUM CHLORIDE 1000 ML: 9 INJECTION, SOLUTION INTRAVENOUS at 11:18

## 2021-08-13 RX ADMIN — DEXAMETHASONE SODIUM PHOSPHATE 6 MG: 10 INJECTION INTRAMUSCULAR; INTRAVENOUS at 11:55

## 2021-08-13 RX ADMIN — MAGNESIUM SULFATE HEPTAHYDRATE 2000 MG: 40 INJECTION, SOLUTION INTRAVENOUS at 13:27

## 2021-08-13 RX ADMIN — IOPAMIDOL 100 ML: 755 INJECTION, SOLUTION INTRAVENOUS at 13:21

## 2021-08-13 RX ADMIN — SODIUM CHLORIDE 1000 ML: 9 INJECTION, SOLUTION INTRAVENOUS at 12:40

## 2021-08-13 RX ADMIN — IMDEVIMAB: 1332 INJECTION, SOLUTION, CONCENTRATE INTRAVENOUS at 14:53

## 2021-08-13 ASSESSMENT — ENCOUNTER SYMPTOMS
EYE PAIN: 0
ABDOMINAL PAIN: 0
VOMITING: 0
SORE THROAT: 0
DIARRHEA: 1
COUGH: 1
NAUSEA: 0
CHEST TIGHTNESS: 0
SHORTNESS OF BREATH: 0

## 2021-08-13 ASSESSMENT — PAIN SCALES - GENERAL
PAINLEVEL_OUTOF10: 0
PAINLEVEL_OUTOF10: 0

## 2021-08-13 NOTE — ED NOTES
Bed: 28  Expected date: 8/13/21  Expected time:   Means of arrival:   Comments:     Kiana Almanzar RN  08/13/21 1100

## 2021-08-13 NOTE — ED NOTES
Patient is to go to the infusion center directly from the ER. Transported patient via wheelchair with family. Aware of the need for increased O2 while sick. They have a pulse ox at home to monitor her. Berto Veras, [place]  08/13/21 4781

## 2021-08-13 NOTE — ED NOTES
Patient to CT via cart. Kieran Betancourt, [facility] [address]  08/13/21 5516

## 2021-08-13 NOTE — ED TRIAGE NOTES
Pt arrived via ems from home with co possible covid   Pt has been weak and had diarrhea since Monday. Pt is aox4 pwd.

## 2021-08-13 NOTE — ED NOTES
Patient assisted to use bathroom. Minimal exertion and patient saturations decreased to 84% on 2L. Patient does not wear oxygen during the day, but does wear 2L at night. Chandra Rebolledo [facility]  08/13/21 1262

## 2021-08-13 NOTE — FLOWSHEET NOTE
Patient to the floor via wheelchair for her Regeneron infusion. Placed in a negative airflow room. Vital signs taken. Oxygen is on at 3 liters nasal cannula. sats are 97 %. Educated both verbal and handout regarding this medication. Made aware of the emergency use and that its experimental. Agrees to proceed with treatment. Call light within reach.

## 2021-08-13 NOTE — ED PROVIDER NOTES
[facility] ED  EMERGENCY DEPARTMENT ENCOUNTER      Pt Name: Kenny Eli  MRN: [id number]  Armsarnoldogfmaggie 12/12/1932  Date of evaluation: 8/13/2021  Provider: Leatha Del Angel DO    CHIEF COMPLAINT       Chief Complaint   Patient presents with    Concern For COVID-19         HISTORY OF PRESENT ILLNESS   (Location/Symptom, Timing/Onset, Context/Setting, Quality, Duration, Modifying Factors, Severity)  Note limiting factors. Kenny Eli is a 80 y.o. female who presents to the emergency department . Patient comes in with some dry cough, general weakness and fatigue. Had diarrhea for a few days but that is better. Poor appetite but not vomiting. Patient was exposed to Covid at a wedding this past week. Positive fevers and chills. Generally patient lives alone independently. HPI    Nursing Notes were reviewed. REVIEW OF SYSTEMS    (2-9 systems for level 4, 10 or more for level 5)     Review of Systems   Constitutional: Positive for activity change, appetite change, fatigue and fever. HENT: Negative for congestion and sore throat. Eyes: Negative for pain and visual disturbance. Respiratory: Positive for cough. Negative for chest tightness and shortness of breath. Cardiovascular: Negative for chest pain. Gastrointestinal: Positive for diarrhea. Negative for abdominal pain, nausea and vomiting. Endocrine: Negative for polydipsia. Genitourinary: Negative for flank pain and urgency. Musculoskeletal: Negative for gait problem and neck stiffness. Skin: Negative for rash. Neurological: Negative for weakness, light-headedness and headaches. Psychiatric/Behavioral: Negative for confusion and sleep disturbance. Except as noted above the remainder of the review of systems was reviewed and negative.        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         SURGICAL HISTORY       Past Surgical History:   Procedure Laterality Date    APPENDECTOMY      HYSTERECTOMY      OTHER SURGICAL HISTORY Right 08/23/2016    EXCISION UPPER BACK MASS    CO TOTAL KNEE ARTHROPLASTY Right 9/15/2017    RIGHT  KNEE TOTAL ARTHROPLASTY performed by Reg Aquino MD at [address] [place] [address]       Previous Medications    AMLODIPINE (NORVASC) 5 MG TABLET        ASPIRIN 81 MG EC TABLET    Take 1 tablet by mouth 2 times daily    CHLORTHALIDONE (HYGROTON) 25 MG TABLET    TAKE 1 TABLET BY MOUTH DAILY    COENZYME Q10 (CO Q 10 PO)    Take by mouth    DENOSUMAB (PROLIA SC)    Inject into the skin    MAGNESIUM 400 MG CAPS    Take by mouth    METOPROLOL SUCCINATE ER (TOPROL XL) 50 MG XL TABLET    Take one(1) tablet daily. MULTIPLE VITAMINS-MINERALS (PRESERVISION AREDS 2+MULTI VIT PO)    Take by mouth    OMEGA-3 FATTY ACIDS (FISH OIL PO)    Take by mouth    OXYGEN    Please supply patient with a portable oxygen concentrator    POTASSIUM CHLORIDE (KLOR-CON M20) 20 MEQ EXTENDED RELEASE TABLET        PRAVASTATIN (PRAVACHOL) 40 MG TABLET        PROBIOTIC PRODUCT (PROBIOTIC & ACIDOPHILUS EX ST) CAPS    Take by mouth    VITAMIN D (CHOLECALCIFEROL) 1000 UNIT TABS TABLET    Take 1,000 Units by mouth daily       ALLERGIES     Bisphosphonates, Sulfa antibiotics, and Lisinopril    FAMILY HISTORY       Family History   Problem Relation Age of Onset    Heart Disease Mother     Heart Disease Father     Cancer Other         breast          SOCIAL HISTORY       Social History     Socioeconomic History    Marital status:       Spouse name: None    Number of children: None    Years of education: None    Highest education level: None   Occupational History    None   Tobacco Use    Smoking status: Former Smoker     Quit date: 1/1/1986 Years since quittin.6    Smokeless tobacco: Never Used   Substance and Sexual Activity    Alcohol use: No    Drug use: No    Sexual activity: None   Other Topics Concern    None   Social History Narrative    None     Social Determinants of Health     Financial Resource Strain:     Difficulty of Paying Living Expenses:    Food Insecurity:     Worried About Running Out of Food in the Last Year:     Ran Out of Food in the Last Year:    Transportation Needs:     Lack of Transportation (Medical):  Lack of Transportation (Non-Medical):    Physical Activity:     Days of Exercise per Week:     Minutes of Exercise per Session:    Stress:     Feeling of Stress :    Social Connections:     Frequency of Communication with Friends and Family:     Frequency of Social Gatherings with Friends and Family:     Attends Cheondoism Services:     Active Member of Clubs or Organizations:     Attends Club or Organization Meetings:     Marital Status:    Intimate Partner Violence:     Fear of Current or Ex-Partner:     Emotionally Abused:     Physically Abused:     Sexually Abused:        SCREENINGS                        PHYSICAL EXAM    (up to 7 for level 4, 8 or more for level 5)     ED Triage Vitals [21 1105]   BP Temp Temp Source Pulse Resp SpO2 Height Weight   (!) 140/61 101.5 °F (38.6 °C) Oral 95 18 95 % 5' 3\" (1.6 m) 140 lb (63.5 kg)       Physical Exam  Vitals and nursing note reviewed. Constitutional:       General: She is not in acute distress. Appearance: She is well-developed. She is ill-appearing. She is not diaphoretic. HENT:      Head: Normocephalic and atraumatic. Right Ear: External ear normal.      Left Ear: External ear normal.      Nose: Nose normal.      Mouth/Throat:      Mouth: Mucous membranes are dry. Pharynx: No oropharyngeal exudate. Eyes:      Conjunctiva/sclera: Conjunctivae normal.      Pupils: Pupils are equal, round, and reactive to light.    Neck: Thyroid: No thyromegaly. Vascular: No JVD. Trachea: No tracheal deviation. Cardiovascular:      Rate and Rhythm: Normal rate and regular rhythm. Heart sounds: Normal heart sounds. No murmur heard. Pulmonary:      Effort: Pulmonary effort is normal. No respiratory distress. Breath sounds: Normal breath sounds. No wheezing or rales. Abdominal:      General: Bowel sounds are normal.      Palpations: Abdomen is soft. Tenderness: There is no abdominal tenderness. There is no guarding. Musculoskeletal:         General: Normal range of motion. Cervical back: Normal range of motion and neck supple. Skin:     General: Skin is warm and dry. Findings: No rash. Neurological:      General: No focal deficit present. Mental Status: She is alert and oriented to person, place, and time. Cranial Nerves: No cranial nerve deficit. Psychiatric:         Behavior: Behavior normal.         DIAGNOSTIC RESULTS     EKG: All EKG's are interpreted by the Emergency Department Physician who either signs or Co-signs this chart in the absence of a cardiologist.        RADIOLOGY:   Non-plain film images such as CT, Ultrasound and MRI are read by the radiologist. Plain radiographic images are visualized and preliminarily interpreted by the emergency physician with the below findings:        Interpretation per the Radiologist below, if available at the time of this note:    CTA Chest W WO  (PE study)   Final Result      Negative CTA of the chest. No evidence of thoracic aortic dissection or aneurysm. The study is negative for pulmonary emboli. There are patchy bilateral alveolar opacities worrisome for early infiltrates possible viral pneumonia. Differential diagnosis includes COVID-19. XR CHEST PORTABLE   Final Result   Increased pulmonary markings indicate fluid overload, CHF, or viral pneumonia.                   ED BEDSIDE ULTRASOUND:   Performed by ED Physician - none    LABS:  Labs Reviewed   COVID-19, RAPID - Abnormal; Notable for the following components:       Result Value    SARS-CoV-2, NAAT DETECTED (*)     All other components within normal limits    Narrative:     Michelle CARDENAS tel. [phone],  called xavier navarro, 08/13/2021 11:42, by Earnestine Andrade   MAGNESIUM - Abnormal; Notable for the following components:    Magnesium 1.5 (*)     All other components within normal limits   CBC WITH AUTO DIFFERENTIAL - Abnormal; Notable for the following components:    Lymphocytes Absolute 0.4 (*)     All other components within normal limits   COMPREHENSIVE METABOLIC PANEL - Abnormal; Notable for the following components:    Chloride 94 (*)     Anion Gap 16 (*)     Glucose 139 (*)     BUN 39 (*)     CREATININE 1.10 (*)     GFR Non- 46.8 (*)     GFR  56.6 (*)     All other components within normal limits   URINE RT REFLEX TO CULTURE - Abnormal; Notable for the following components:    Protein, UA 30 (*)     Leukocyte Esterase, Urine TRACE (*)     All other components within normal limits   CULTURE, BLOOD 2   CULTURE, BLOOD 1   TROPONIN   MICROSCOPIC URINALYSIS   PROTIME-INR   LACTIC ACID, PLASMA       All other labs were within normal range or not returned as of this dictation. EMERGENCY DEPARTMENT COURSE and DIFFERENTIAL DIAGNOSIS/MDM:   Vitals:    Vitals:    08/13/21 1200 08/13/21 1228 08/13/21 1300 08/13/21 1330   BP: (!) 94/41  (!) 86/59 (!) 109/48   Pulse: 89   79   Resp: 20   18   Temp:  99.6 °F (37.6 °C)     TempSrc:  Oral     SpO2: 96%  95% 94%   Weight:       Height:           Patient here with Covid. Does have viral pneumonia on CAT scan. Patient given 2 L of fluid IV Decadron. Patient to go to infusion center for monoclonal antibody infusion. This is a weekend and I am concerned that this patient will deteriorate. Trying to give her every chance she can to do well and not be hospitalized.     MDM      REASSESSMENT          CRITICAL CARE TIME   Total Critical Care time was 30 minutes, excluding separately reportable procedures. There was a high probability of clinically significant/life threatening deterioration in the patient's condition which required my urgent intervention. CONSULTS:  None    PROCEDURES:  Unless otherwise noted below, none     Procedures        FINAL IMPRESSION      1. COVID-19          DISPOSITION/PLAN   DISPOSITION Decision To Discharge 08/13/2021 01:46:46 PM      PATIENT REFERRED TO:  Vy Pena MD  [facility], [address] [place] [address],[address]  [address] [facility]  [phone]      As needed      DISCHARGE MEDICATIONS:  New Prescriptions    BUDESONIDE-FORMOTEROL (SYMBICORT) 160-4.5 MCG/ACT AERO    Inhale 2 puffs into the lungs 2 times daily    DEXAMETHASONE (DECADRON) 6 MG TABLET    Take 1 tablet by mouth daily (with breakfast) for 10 days     Controlled Substances Monitoring:     No flowsheet data found.     (Please note that portions of this note were completed with a voice recognition program.  Efforts were made to edit the dictations but occasionally words are mis-transcribed.)    Stuart Pierce DO (electronically signed)  Attending Emergency Physician            Stuart Pierce DO  08/13/21 1068

## 2021-08-16 ENCOUNTER — CARE COORDINATION (OUTPATIENT)
Dept: CARE COORDINATION | Age: 86
End: 2021-08-16

## 2021-08-16 ENCOUNTER — TELEPHONE (OUTPATIENT)
Dept: FAMILY MEDICINE CLINIC | Age: 86
End: 2021-08-16

## 2021-08-16 NOTE — TELEPHONE ENCOUNTER
PLEASE SEND ORDER TO MEDICAL SERVICES  FOR O2 . 3L [place]  EQUIPMENT SERVICES. ACM CALLED [employer] [facility]PL[employer], SPOKE TO SIMA  THEY WILL NEED ORDER, NOTES AND DEMOGRAPHICS ACM ADVISED HER TO CALL DAUGHTER.

## 2021-08-16 NOTE — CARE COORDINATION
Patient contacted regarding COVID-19 risk, exposure, diagnosis, pulse oximeter ordered at discharge and monoclonal antibody infusion follow up. Discussed COVID-19 related testing which was available at this time. Test results were positive. Patient informed of results, if available? Yes. Ambulatory Care Manager contacted the caregiver by telephone to perform post discharge assessment. Call within 2 business days of discharge: Yes. Verified name and  with caregiver as identifiers. Provided introduction to self, and explanation of the CTN/ACM role, and reason for call due to risk factors for infection and/or exposure to COVID-19. Symptoms reviewed with caregiver who verbalized the following symptoms: fatigue, no new symptoms and no worsening symptoms. Due to no new or worsening symptoms encounter was not routed to provider for escalation. Discussed follow-up appointments. If no appointment was previously scheduled, appointment scheduling offered: Yes. [address] follow up appointment(s): No future appointments. Non-[facility] follow up appointment(s):     Non-face-to-face services provided:  Obtained and reviewed discharge summary and/or continuity of care documents  Education of patient/family/caregiver/guardian to support self-management-COVID      Advance Care Planning:   Does patient have an Advance Directive:  reviewed and current. Educated patient about risk for severe COVID-19 due to risk factors according to CDC guidelines. ACM reviewed discharge instructions, medical action plan and red flag symptoms with the caregiver who verbalized understanding. Discussed COVID vaccination status: Yes. Education provided on COVID-19 vaccination as appropriate. Discussed exposure protocols and quarantine with CDC Guidelines. Caregiver was given an opportunity to verbalize any questions and concerns and agrees to contact ACM or health care provider for questions related to their healthcare.     Reviewed and educated caregiver on any new and changed medications related to discharge diagnosis     Was patient discharged with a pulse oximeter? No and BUT HAS ONE  O2 SATS 89-90 BUT DOESN'T SUSTAIN GOES BACK UP TO 93-96 Discussed and confirmed pulse oximeter discharge instructions and when to notify provider or seek emergency care. ACM provided contact information. Plan for follow-up call in 3-5 days based on severity of symptoms and risk factors. ACM SPOKE TO DAUGHTER SHE REPORTS MOTHER DOING OKAY. O2 SATS 93-95 BUT SOME TIMES DROPS TO 88-89%  ACM REVIEWED PO2 MONITORING AND COVID S/S  DAUGHTER ADVISED IF SATS SUSTAIN < 90 TO RETURN TO ED FOR EVALUATION. SHE REPORTS MOTHER HAS O2 AT HOME BUT SERVICE LIGHT ON. ACM CONTACTED OFFICE AND SUGGESTED THEY SEND O2 ORDER TO MEDICAL SERVICE COMPANY. ACM ALSO CALLED SIMA TO ASSIST WITH PATIENT NEEDS. ACM REVIEWED COVID  EDU/RISKS AND S/S  ACM REVIEWED CDC GUIDELINES. Steps to help prevent the spread of COVID-19 if you are sick  SOURCE - https://sen-linares.info/. html     Stay home except to get medical care   ; Stay home: People who are mildly ill with COVID-19 are able to isolate at home during their illness. You should restrict activities outside your home, except for getting medical care.   ; Avoid public areas: Do not go to work, school, or public areas.   ; Avoid public transportation: Avoid using public transportation, ride-sharing, or taxis.  ; Separate yourself from other people and animals in your home   ; Stay away from others: As much as possible, you should stay in a specific room and away from other people in your home. Also, you should use a separate bathroom, if available.   ; Limit contact with pets & animals: You should restrict contact with pets and other animals while you are sick with COVID-19, just like you would around other people.  Although there have not been reports of pets or other animals becoming sick with COVID-19, it is still recommended that people sick with COVID-19 limit contact with animals until more information is known about the virus. ; When possible, have another member of your household care for your animals while you are sick. If you are sick with COVID-19, avoid contact with your pet, including petting, snuggling, being kissed or licked, and sharing food. If you must care for your pet or be around animals while you are sick, wash your hands before and after you interact with pets and wear a facemask. See COVID-19 and Animals for more information. Other considerations   The ill person should eat/be fed in their room if possible. Non-disposable  items used should be handled with gloves and washed with hot water or in a . Clean hands after handling used  items.  If possible, dedicate a lined trash can for the ill person. Use gloves when removing garbage bags, handling, and disposing of trash. Wash hands after handling or disposing of trash.  Consider consulting with your local health department about trash disposal guidance if available. Information for Household Members and Caregivers of Someone who is Sick   Call ahead before visiting your doctor   Call ahead: If you have a medical appointment, call the healthcare provider and tell them that you have or may have COVID-19. This will help the healthcare provider's office take steps to keep other people from getting infected or exposed. Wear a facemask if you are sick   ; If you are sick: You should wear a facemask when you are around other people (e.g., sharing a room or vehicle) or pets and before you enter a healthcare provider's office.    ; If you are caring for others: If the person who is sick is not able to wear a facemask (for example, because it causes trouble breathing), then people who live with the person who is sick should not stay in the same room with them, or they should wear a facemask if they enter a room with the person who is sick. Cover your coughs and sneezes   ; Cover: Cover your mouth and nose with a tissue when you cough or sneeze.   ; Dispose: Throw used tissues in a lined trash can.   ; Wash hands: Immediately wash your hands with soap and water for at least 20 seconds or, if soap and water are not available, clean your hands with an alcohol-based hand  that contains at least 60% alcohol. Clean your hands often   ; Wash hands: Wash your hands often with soap and water for at least 20 seconds, especially after blowing your nose, coughing, or sneezing; going to the bathroom; and before eating or preparing food.   ; Hand : If soap and water are not readily available, use an alcohol-based hand  with at least 60% alcohol, covering all surfaces of your hands and rubbing them together until they feel dry.   ; Soap and water: Soap and water are the best option if hands are visibly dirty.   ; Avoid touching: Avoid touching your eyes, nose, and mouth with unwashed hands. Handwashing Tips   ; Wet your hands with clean, running water (warm or cold), turn off the tap, and apply soap.  ; Lather your hands by rubbing them together with the soap. Lather the backs of your hands, between your fingers, and under your nails. ; Scrub your hands for at least 20 seconds. Need a timer? Hum the [place] from beginning to end twice.  ; Rinse your hands well under clean, running water.  ; Dry your hands using a clean towel or air dry them. Avoid sharing personal household items   ; Do not share: You should not share dishes, drinking glasses, cups, eating utensils, towels, or bedding with other people or pets in your home.   ; Wash thoroughly after use: After using these items, they should be washed thoroughly with soap and water.   Clean all high-touch surfaces everyday   ; Clean and disinfect: Practice routine cleaning of high touch surfaces.  ; High touch surfaces include counters, tabletops, doorknobs, bathroom fixtures, toilets, phones, keyboards, tablets, and bedside tables.  ; Disinfect areas with bodily fluids: Also, clean any surfaces that may have blood, stool, or body fluids on them.   ; Household : Use a household cleaning spray or wipe, according to the label instructions. Labels contain instructions for safe and effective use of the cleaning product including precautions you should take when applying the product, such as wearing gloves and making sure you have good ventilation during use of the product. Monitor your symptoms   Seek medical attention: Seek prompt medical attention if your illness is worsening     (e.g., difficulty breathing).   ; Call your doctor: Before seeking care, call your healthcare provider and tell them that you have, or are being evaluated for, COVID-19.   ; Wear a facemask when sick: Put on a facemask before you enter the facility. These steps will help the healthcare provider's office to keep other people in the office or waiting room from getting infected or exposed. ; Alert health department: Ask your healthcare provider to call the local or [facility] health department. Persons who are placed under active monitoring or facilitated self-monitoring should follow instructions provided by their local health department or occupational health professionals, as appropriate.  ; Call 911 if you have a medical emergency: If you have a medical emergency and need to call 911, notify the dispatch personnel that you have, or are being evaluated for COVID-19. If possible, put on a facemask before emergency medical services arrive. Patient informed that the current CDC recommendations are that if you symptoms are mild to go home and self quarantine for 10 days from the start of symptoms.  Kendalakash was advised and educated about the importance of watching closely for new symptoms of shortness of breath severe weakness severe diarrhea or any

## 2021-08-16 NOTE — TELEPHONE ENCOUNTER
Patients daughter Antonio Alatorre is calling to ask if pcp would write a new script for patients oxygen machine because the current machine needs to be serviced. She states a yellow light came on the machine and she is having a hard time finding a place to service it quickly. She states patient is covid positive. Antonio Alatorre # [phone]    Please advise and thank you.

## 2021-08-17 ENCOUNTER — CARE COORDINATION (OUTPATIENT)
Dept: CARE COORDINATION | Age: 86
End: 2021-08-17

## 2021-08-17 NOTE — TELEPHONE ENCOUNTER
ACM  Called pulm arranged for vv as patient has not been seen since 2019 and has covid. Daughter aware of next steps to obtain new o2 concentrator.

## 2021-08-18 ENCOUNTER — APPOINTMENT (OUTPATIENT)
Dept: CT IMAGING | Age: 86
DRG: 177 | End: 2021-08-18
Payer: MEDICARE

## 2021-08-18 ENCOUNTER — HOSPITAL ENCOUNTER (INPATIENT)
Age: 86
LOS: 8 days | Discharge: HOME | DRG: 177 | End: 2021-08-26
Attending: EMERGENCY MEDICINE
Payer: MEDICARE

## 2021-08-18 ENCOUNTER — APPOINTMENT (OUTPATIENT)
Dept: GENERAL RADIOLOGY | Age: 86
DRG: 177 | End: 2021-08-18
Payer: MEDICARE

## 2021-08-18 DIAGNOSIS — U07.1 COVID-19: Primary | ICD-10-CM

## 2021-08-18 DIAGNOSIS — R09.02 HYPOXIA: ICD-10-CM

## 2021-08-18 PROBLEM — J06.9 ACUTE RESPIRATORY DISEASE DUE TO COVID-19 VIRUS: Status: ACTIVE | Noted: 2021-08-18

## 2021-08-18 LAB
ALBUMIN SERPL-MCNC: 3.7 G/DL (ref 3.5–4.6)
ALP BLD-CCNC: 94 U/L (ref 40–130)
ALT SERPL-CCNC: 30 U/L (ref 0–33)
ANION GAP SERPL CALCULATED.3IONS-SCNC: 14 MEQ/L (ref 9–15)
ANISOCYTOSIS: ABNORMAL
AST SERPL-CCNC: 25 U/L (ref 0–35)
BACTERIA: ABNORMAL /HPF
BASE EXCESS ARTERIAL: 6 (ref -3–3)
BASOPHILS ABSOLUTE: 0 K/UL (ref 0–0.2)
BASOPHILS RELATIVE PERCENT: 0.5 %
BILIRUB SERPL-MCNC: 0.7 MG/DL (ref 0.2–0.7)
BILIRUBIN URINE: NEGATIVE
BLOOD, URINE: ABNORMAL
BUN BLDV-MCNC: 47 MG/DL (ref 8–23)
CALCIUM IONIZED: 1.32 MMOL/L (ref 1.12–1.32)
CALCIUM SERPL-MCNC: 10.6 MG/DL (ref 8.5–9.9)
CHLORIDE BLD-SCNC: 89 MEQ/L (ref 95–107)
CLARITY: CLEAR
CO2: 30 MEQ/L (ref 20–31)
COLOR: YELLOW
CREAT SERPL-MCNC: 0.67 MG/DL (ref 0.5–0.9)
CULTURE, BLOOD 2: NORMAL
D DIMER: 0.96 MG/L FEU (ref 0–0.5)
EKG ATRIAL RATE: 90 BPM
EKG P AXIS: 23 DEGREES
EKG P-R INTERVAL: 160 MS
EKG Q-T INTERVAL: 354 MS
EKG QRS DURATION: 82 MS
EKG QTC CALCULATION (BAZETT): 433 MS
EKG R AXIS: -31 DEGREES
EKG T AXIS: 6 DEGREES
EKG VENTRICULAR RATE: 90 BPM
EOSINOPHILS ABSOLUTE: 0 K/UL (ref 0–0.7)
EOSINOPHILS RELATIVE PERCENT: 0.1 %
EPITHELIAL CELLS, UA: ABNORMAL /HPF (ref 0–5)
GFR AFRICAN AMERICAN: >60
GFR AFRICAN AMERICAN: >60
GFR NON-AFRICAN AMERICAN: 59
GFR NON-AFRICAN AMERICAN: >60
GLOBULIN: 3 G/DL (ref 2.3–3.5)
GLUCOSE BLD-MCNC: 224 MG/DL (ref 70–99)
GLUCOSE BLD-MCNC: 238 MG/DL (ref 60–115)
GLUCOSE URINE: 100 MG/DL
HCO3 ARTERIAL: 30 MMOL/L (ref 21–29)
HCT VFR BLD CALC: 42.5 % (ref 37–47)
HEMOGLOBIN: 13.6 GM/DL (ref 12–16)
HEMOGLOBIN: 14.7 G/DL (ref 12–16)
HYALINE CASTS: ABNORMAL /HPF (ref 0–5)
KETONES, URINE: NEGATIVE MG/DL
LACTATE: 2.77 MMOL/L (ref 0.4–2)
LEUKOCYTE ESTERASE, URINE: NEGATIVE
LYMPHOCYTES ABSOLUTE: 0.4 K/UL (ref 1–4.8)
LYMPHOCYTES RELATIVE PERCENT: 3 %
MAGNESIUM: 1.7 MG/DL (ref 1.7–2.4)
MCH RBC QN AUTO: 30.6 PG (ref 27–31.3)
MCHC RBC AUTO-ENTMCNC: 34.7 % (ref 33–37)
MCV RBC AUTO: 88.3 FL (ref 82–100)
MICROCYTES: ABNORMAL
MONOCYTES ABSOLUTE: 0.6 K/UL (ref 0.2–0.8)
MONOCYTES RELATIVE PERCENT: 4.9 %
NEUTROPHILS ABSOLUTE: 10.9 K/UL (ref 1.4–6.5)
NEUTROPHILS RELATIVE PERCENT: 92 %
NITRITE, URINE: NEGATIVE
O2 SAT, ARTERIAL: 97 % (ref 93–100)
PCO2 ARTERIAL: 44 MM HG (ref 35–45)
PDW BLD-RTO: 12.4 % (ref 11.5–14.5)
PERFORMED ON: ABNORMAL
PH ARTERIAL: 7.44 (ref 7.35–7.45)
PH UA: 6.5 (ref 5–9)
PLATELET # BLD: 340 K/UL (ref 130–400)
PLATELET SLIDE REVIEW: NORMAL
PO2 ARTERIAL: 88 MM HG (ref 75–108)
POC CHLORIDE: 96 MEQ/L (ref 99–110)
POC CREATININE WHOLE BLOOD: 0.7
POC CREATININE: 0.9 MG/DL (ref 0.6–1.2)
POC FIO2: 75
POC HEMATOCRIT: 40 % (ref 36–48)
POC POTASSIUM: 3.1 MEQ/L (ref 3.5–5.1)
POC SAMPLE TYPE: ABNORMAL
POC SODIUM: 137 MEQ/L (ref 136–145)
POTASSIUM SERPL-SCNC: 3.2 MEQ/L (ref 3.4–4.9)
PROCALCITONIN: 0.13 NG/ML (ref 0–0.15)
PROTEIN UA: ABNORMAL MG/DL
RBC # BLD: 4.81 M/UL (ref 4.2–5.4)
RBC UA: ABNORMAL /HPF (ref 0–5)
SMUDGE CELLS: 5.9
SODIUM BLD-SCNC: 133 MEQ/L (ref 135–144)
SPECIFIC GRAVITY UA: 1.03 (ref 1–1.03)
TCO2 ARTERIAL: 31 (ref 22–29)
TOTAL PROTEIN: 6.7 G/DL (ref 6.3–8)
TROPONIN: <0.01 NG/ML (ref 0–0.01)
URINE REFLEX TO CULTURE: ABNORMAL
UROBILINOGEN, URINE: 0.2 E.U./DL
WBC # BLD: 11.8 K/UL (ref 4.8–10.8)
WBC UA: ABNORMAL /HPF (ref 0–5)

## 2021-08-18 PROCEDURE — 83735 ASSAY OF MAGNESIUM: CPT

## 2021-08-18 PROCEDURE — 36415 COLL VENOUS BLD VENIPUNCTURE: CPT

## 2021-08-18 PROCEDURE — 6360000004 HC RX CONTRAST MEDICATION: Performed by: EMERGENCY MEDICINE

## 2021-08-18 PROCEDURE — 93010 ELECTROCARDIOGRAM REPORT: CPT | Performed by: INTERNAL MEDICINE

## 2021-08-18 PROCEDURE — 84484 ASSAY OF TROPONIN QUANT: CPT

## 2021-08-18 PROCEDURE — 87040 BLOOD CULTURE FOR BACTERIA: CPT

## 2021-08-18 PROCEDURE — 85379 FIBRIN DEGRADATION QUANT: CPT

## 2021-08-18 PROCEDURE — 84145 PROCALCITONIN (PCT): CPT

## 2021-08-18 PROCEDURE — 84132 ASSAY OF SERUM POTASSIUM: CPT

## 2021-08-18 PROCEDURE — 71275 CT ANGIOGRAPHY CHEST: CPT

## 2021-08-18 PROCEDURE — 81001 URINALYSIS AUTO W/SCOPE: CPT

## 2021-08-18 PROCEDURE — 6360000002 HC RX W HCPCS: Performed by: INTERNAL MEDICINE

## 2021-08-18 PROCEDURE — 93005 ELECTROCARDIOGRAM TRACING: CPT | Performed by: EMERGENCY MEDICINE

## 2021-08-18 PROCEDURE — 2700000000 HC OXYGEN THERAPY PER DAY

## 2021-08-18 PROCEDURE — 80053 COMPREHEN METABOLIC PANEL: CPT

## 2021-08-18 PROCEDURE — 99285 EMERGENCY DEPT VISIT HI MDM: CPT

## 2021-08-18 PROCEDURE — 82330 ASSAY OF CALCIUM: CPT

## 2021-08-18 PROCEDURE — 71045 X-RAY EXAM CHEST 1 VIEW: CPT

## 2021-08-18 PROCEDURE — 82435 ASSAY OF BLOOD CHLORIDE: CPT

## 2021-08-18 PROCEDURE — 82803 BLOOD GASES ANY COMBINATION: CPT

## 2021-08-18 PROCEDURE — 82565 ASSAY OF CREATININE: CPT

## 2021-08-18 PROCEDURE — 85025 COMPLETE CBC W/AUTO DIFF WBC: CPT

## 2021-08-18 PROCEDURE — 6370000000 HC RX 637 (ALT 250 FOR IP): Performed by: INTERNAL MEDICINE

## 2021-08-18 PROCEDURE — 2580000003 HC RX 258: Performed by: INTERNAL MEDICINE

## 2021-08-18 PROCEDURE — 2000000000 HC ICU R&B

## 2021-08-18 PROCEDURE — 84295 ASSAY OF SERUM SODIUM: CPT

## 2021-08-18 PROCEDURE — 2500000003 HC RX 250 WO HCPCS: Performed by: INTERNAL MEDICINE

## 2021-08-18 PROCEDURE — 83605 ASSAY OF LACTIC ACID: CPT

## 2021-08-18 PROCEDURE — XW033E5 INTRODUCTION OF REMDESIVIR ANTI-INFECTIVE INTO PERIPHERAL VEIN, PERCUTANEOUS APPROACH, NEW TECHNOLOGY GROUP 5: ICD-10-PCS | Performed by: INTERNAL MEDICINE

## 2021-08-18 PROCEDURE — 94761 N-INVAS EAR/PLS OXIMETRY MLT: CPT

## 2021-08-18 PROCEDURE — 85014 HEMATOCRIT: CPT

## 2021-08-18 RX ORDER — PRAVASTATIN SODIUM 40 MG
40 TABLET ORAL NIGHTLY
Status: DISCONTINUED | OUTPATIENT
Start: 2021-08-18 | End: 2021-08-26 | Stop reason: HOSPADM

## 2021-08-18 RX ORDER — ACETAMINOPHEN 650 MG/1
650 SUPPOSITORY RECTAL EVERY 6 HOURS PRN
Status: DISCONTINUED | OUTPATIENT
Start: 2021-08-18 | End: 2021-08-26 | Stop reason: HOSPADM

## 2021-08-18 RX ORDER — ACETAMINOPHEN 325 MG/1
650 TABLET ORAL EVERY 6 HOURS PRN
Status: DISCONTINUED | OUTPATIENT
Start: 2021-08-18 | End: 2021-08-26 | Stop reason: HOSPADM

## 2021-08-18 RX ORDER — SODIUM CHLORIDE 0.9 % (FLUSH) 0.9 %
5-40 SYRINGE (ML) INJECTION PRN
Status: DISCONTINUED | OUTPATIENT
Start: 2021-08-18 | End: 2021-08-26 | Stop reason: HOSPADM

## 2021-08-18 RX ORDER — VITAMIN B COMPLEX
2000 TABLET ORAL DAILY
Status: DISCONTINUED | OUTPATIENT
Start: 2021-08-18 | End: 2021-08-26 | Stop reason: HOSPADM

## 2021-08-18 RX ORDER — ONDANSETRON 4 MG/1
4 TABLET, ORALLY DISINTEGRATING ORAL EVERY 8 HOURS PRN
Status: DISCONTINUED | OUTPATIENT
Start: 2021-08-18 | End: 2021-08-26 | Stop reason: HOSPADM

## 2021-08-18 RX ORDER — SODIUM CHLORIDE 0.9 % (FLUSH) 0.9 %
5-40 SYRINGE (ML) INJECTION EVERY 12 HOURS SCHEDULED
Status: DISCONTINUED | OUTPATIENT
Start: 2021-08-18 | End: 2021-08-26 | Stop reason: HOSPADM

## 2021-08-18 RX ORDER — ALBUTEROL SULFATE 90 UG/1
2 AEROSOL, METERED RESPIRATORY (INHALATION) EVERY 6 HOURS PRN
Status: DISCONTINUED | OUTPATIENT
Start: 2021-08-18 | End: 2021-08-19

## 2021-08-18 RX ORDER — ASPIRIN 81 MG/1
81 TABLET ORAL 2 TIMES DAILY
Status: DISCONTINUED | OUTPATIENT
Start: 2021-08-18 | End: 2021-08-26 | Stop reason: HOSPADM

## 2021-08-18 RX ORDER — SODIUM CHLORIDE 9 MG/ML
25 INJECTION, SOLUTION INTRAVENOUS PRN
Status: DISCONTINUED | OUTPATIENT
Start: 2021-08-18 | End: 2021-08-26 | Stop reason: HOSPADM

## 2021-08-18 RX ORDER — GUAIFENESIN/DEXTROMETHORPHAN 100-10MG/5
5 SYRUP ORAL EVERY 4 HOURS PRN
Status: DISCONTINUED | OUTPATIENT
Start: 2021-08-18 | End: 2021-08-26 | Stop reason: HOSPADM

## 2021-08-18 RX ORDER — METOPROLOL SUCCINATE 50 MG/1
50 TABLET, EXTENDED RELEASE ORAL DAILY
Status: DISCONTINUED | OUTPATIENT
Start: 2021-08-18 | End: 2021-08-26 | Stop reason: HOSPADM

## 2021-08-18 RX ORDER — POLYETHYLENE GLYCOL 3350 17 G/17G
17 POWDER, FOR SOLUTION ORAL DAILY PRN
Status: DISCONTINUED | OUTPATIENT
Start: 2021-08-18 | End: 2021-08-26 | Stop reason: HOSPADM

## 2021-08-18 RX ORDER — ONDANSETRON 2 MG/ML
4 INJECTION INTRAMUSCULAR; INTRAVENOUS EVERY 6 HOURS PRN
Status: DISCONTINUED | OUTPATIENT
Start: 2021-08-18 | End: 2021-08-26 | Stop reason: HOSPADM

## 2021-08-18 RX ORDER — SODIUM CHLORIDE 0.9 % (FLUSH) 0.9 %
10 SYRINGE (ML) INJECTION
Status: DISPENSED | OUTPATIENT
Start: 2021-08-18 | End: 2021-08-18

## 2021-08-18 RX ORDER — DEXAMETHASONE SODIUM PHOSPHATE 4 MG/ML
6 INJECTION, SOLUTION INTRA-ARTICULAR; INTRALESIONAL; INTRAMUSCULAR; INTRAVENOUS; SOFT TISSUE EVERY 24 HOURS
Status: DISCONTINUED | OUTPATIENT
Start: 2021-08-18 | End: 2021-08-26 | Stop reason: HOSPADM

## 2021-08-18 RX ADMIN — ACETAMINOPHEN 650 MG: 325 TABLET ORAL at 18:40

## 2021-08-18 RX ADMIN — SODIUM CHLORIDE, PRESERVATIVE FREE 10 ML: 5 INJECTION INTRAVENOUS at 19:32

## 2021-08-18 RX ADMIN — FAMOTIDINE 20 MG: 10 INJECTION INTRAVENOUS at 18:40

## 2021-08-18 RX ADMIN — METOPROLOL SUCCINATE 50 MG: 50 TABLET, FILM COATED, EXTENDED RELEASE ORAL at 20:18

## 2021-08-18 RX ADMIN — IOPAMIDOL 100 ML: 755 INJECTION, SOLUTION INTRAVENOUS at 15:14

## 2021-08-18 RX ADMIN — DEXAMETHASONE SODIUM PHOSPHATE 6 MG: 4 INJECTION, SOLUTION INTRAMUSCULAR; INTRAVENOUS at 18:40

## 2021-08-18 RX ADMIN — PRAVASTATIN SODIUM 40 MG: 40 TABLET ORAL at 20:18

## 2021-08-18 RX ADMIN — REMDESIVIR 200 MG: 100 INJECTION, POWDER, LYOPHILIZED, FOR SOLUTION INTRAVENOUS at 20:19

## 2021-08-18 RX ADMIN — ASPIRIN 81 MG: 81 TABLET, COATED ORAL at 20:18

## 2021-08-18 RX ADMIN — Medication 10 ML: at 21:17

## 2021-08-18 RX ADMIN — ENOXAPARIN SODIUM 30 MG: 30 INJECTION SUBCUTANEOUS at 20:18

## 2021-08-18 RX ADMIN — Medication 2000 UNITS: at 18:40

## 2021-08-18 ASSESSMENT — ENCOUNTER SYMPTOMS
SHORTNESS OF BREATH: 1
WHEEZING: 1
COUGH: 1

## 2021-08-18 ASSESSMENT — PAIN SCALES - GENERAL: PAINLEVEL_OUTOF10: 3

## 2021-08-18 NOTE — ED PROVIDER NOTES
[address] [facility] ED  EMERGENCY MEDICINE     Pt Name: Sharmila Leyva  MRN: [id number]  [place] 12/12/1932  Date of evaluation: 8/18/2021  PCP:    Ivett Benjamin MD  Provider: Kelly Rubio DO    CHIEF COMPLAINT       Chief Complaint   Patient presents with    Shortness of Breath    Positive For Covid-19       HISTORY OF PRESENT ILLNESS    HPI     26-year-old female with history of hypertension and COPD presents to the emergency department with complaint of difficulty breathing and shortness of breath. Patient has been coughing since last Monday and was recently diagnosed with Covid on 8/13/2021 in the emergency department. She wears 2 L of oxygen at home at only at night. Patient states she started developing increased worsening breathing over the past couple of days. During her hospital stay on the 13th, she was sent home after getting an infusion of the monoclonal antibody regimen. They increased her oxygen to 3 L all the time after being discharged. Patient was satting 75% on room air and 85% on 5 L. Patient denies diarrhea nausea vomiting chest pain. She denies abdominal pain. Denies fevers or chills. Triage notes and Nursing notes were reviewed by myself. Any discrepancies are addressed above.     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       SURGICAL HISTORY       Past Surgical History:   Procedure Laterality Date    APPENDECTOMY      HYSTERECTOMY      OTHER SURGICAL HISTORY Right 08/23/2016    EXCISION UPPER BACK MASS    TN TOTAL KNEE ARTHROPLASTY Right 9/15/2017    RIGHT  KNEE TOTAL ARTHROPLASTY performed by Tete Wild MD at [address] [place] [address]       Previous Medications    AMLODIPINE (NORVASC) 5 MG TABLET        ASPIRIN 81 MG EC TABLET    Take 1 tablet by mouth 2 times daily    BUDESONIDE-FORMOTEROL (SYMBICORT) 160-4.5 MCG/ACT AERO    Inhale 2 puffs into the lungs 2 times daily    CHLORTHALIDONE (HYGROTON) 25 MG TABLET    TAKE 1 TABLET BY MOUTH DAILY    COENZYME Q10 (CO Q 10 PO)    Take by mouth    DENOSUMAB (PROLIA SC)    Inject into the skin    DEXAMETHASONE (DECADRON) 6 MG TABLET    Take 1 tablet by mouth daily (with breakfast) for 10 days    MAGNESIUM 400 MG CAPS    Take by mouth    METOPROLOL SUCCINATE ER (TOPROL XL) 50 MG XL TABLET    Take one(1) tablet daily. MULTIPLE VITAMINS-MINERALS (PRESERVISION AREDS 2+MULTI VIT PO)    Take by mouth    OMEGA-3 FATTY ACIDS (FISH OIL PO)    Take by mouth    OXYGEN    Please supply patient with a portable oxygen concentrator    POTASSIUM CHLORIDE (KLOR-CON M20) 20 MEQ EXTENDED RELEASE TABLET        PRAVASTATIN (PRAVACHOL) 40 MG TABLET        PROBIOTIC PRODUCT (PROBIOTIC & ACIDOPHILUS EX ST) CAPS    Take by mouth    VITAMIN D (CHOLECALCIFEROL) 1000 UNIT TABS TABLET    Take 1,000 Units by mouth daily       ALLERGIES       Allergies   Allergen Reactions    Bisphosphonates Swelling    Sulfa Antibiotics Swelling    Lisinopril Other (See Comments)     ACE inhibitors make her cough       FAMILY HISTORY       Family History   Problem Relation Age of Onset    Heart Disease Mother     Heart Disease Father     Cancer Other         breast        SOCIAL HISTORY       Social History     Socioeconomic History    Marital status:       Spouse name: None    Number of children: None    Years of education: None    Highest education level: None   Occupational History    None   Tobacco Use    Smoking status: Former Smoker     Quit date: 1986     Years since quittin.6    Smokeless tobacco: Never Used   Substance and Sexual Activity    Alcohol use: No    Drug use: No    Sexual activity: None   Other Topics Concern    None   Social History Narrative    None     Social Determinants of Health     Financial Resource Strain:     Difficulty of Paying Living Expenses:    Food Insecurity:     Worried About Running Out of Food in the Last Year:     [address] in the Last Year:    Transportation Needs:     Lack of Transportation (Medical):  Lack of Transportation (Non-Medical):    Physical Activity:     Days of Exercise per Week:     Minutes of Exercise per Session:    Stress:     Feeling of Stress :    Social Connections:     Frequency of Communication with Friends and Family:     Frequency of Social Gatherings with Friends and Family:     Attends Orthodoxy Services:     Active Member of Clubs or Organizations:     Attends Club or Organization Meetings:     Marital Status:    Intimate Partner Violence:     Fear of Current or Ex-Partner:     Emotionally Abused:     Physically Abused:     Sexually Abused:        REVIEW OF SYSTEMS     Review of Systems   Constitutional: Positive for appetite change, chills, fatigue and fever. Respiratory: Positive for cough, shortness of breath and wheezing. Except as noted above the remainder of the review of systems was reviewed and is negative. SCREENINGS        [place] Coma Scale  Eye Opening: Spontaneous  Best Verbal Response: Oriented  Best Motor Response: Extension to pain  Alexander Coma Scale Score: 11               PHYSICAL EXAM    (up to 7 for level 4, 8 or more for level 5)     ED Triage Vitals [08/18/21 1305]   BP Temp Temp Source Pulse Resp SpO2 Height Weight   (!) 141/70 97.6 °F (36.4 °C) Oral 96 26 (!) 85 % 5' 3\" (1.6 m) 137 lb (62.1 kg)       Physical Exam  Constitutional:       General: She is not in acute distress. Appearance: Normal appearance. She is normal weight. She is not ill-appearing. HENT:      Head: Normocephalic and atraumatic. Right Ear: External ear normal.      Left Ear: External ear normal.      Nose: Nose normal. No congestion or rhinorrhea. Mouth/Throat:      Mouth: Mucous membranes are moist.      Pharynx: Oropharynx is clear. No oropharyngeal exudate or posterior oropharyngeal erythema. Eyes:      General:         Right eye: No discharge. Left eye: No discharge. Extraocular Movements: Extraocular movements intact. Conjunctiva/sclera: Conjunctivae normal.      Pupils: Pupils are equal, round, and reactive to light. Cardiovascular:      Rate and Rhythm: Normal rate and regular rhythm. Pulses: Normal pulses. Pulmonary:      Effort: Pulmonary effort is normal. Tachypnea present. No respiratory distress. Breath sounds: Examination of the right-lower field reveals rales. Examination of the left-lower field reveals rhonchi and rales. Rhonchi and rales present. No wheezing. Abdominal:      General: Abdomen is flat. Bowel sounds are normal. There is no distension. Tenderness: There is no abdominal tenderness. There is no right CVA tenderness, left CVA tenderness, guarding or rebound. Musculoskeletal:         General: No swelling or tenderness. Normal range of motion. Cervical back: Normal range of motion and neck supple. Right lower leg: No edema. Left lower leg: No edema. Skin:     General: Skin is warm and dry. Capillary Refill: Capillary refill takes less than 2 seconds. Neurological:      General: No focal deficit present. Mental Status: She is alert. Psychiatric:         Mood and Affect: Mood normal.           DIAGNOSTIC RESULTS     EKG:(none if blank)  All EKGs are interpreted by the Emergency Department Physician who either signs or Co-signs this chart in the absence of a cardiologist.    EKG performed at 1345 normal sinus rhythm heart rate 90 bpm  no significant ST-T segment elevation or depression. RADIOLOGY: (none if blank)   I directly visualized the following images and reviewed the radiologist interpretations.   Interpretation per the Radiologist below, if available at the time of this note:  CTA Chest W WO  (PE study)   Final Result   1. NO FINDINGS CENTRAL, PROXIMAL OR PROXIMAL- SEGMENTAL PULMONARY EMBOLI. .     2. THERE IS MILD EMPHYSEMATOUS DISEASE AND CHRONIC INTERSTITIAL CHANGES WITH WORSENING AREAS OF PATCHY MULTIFOCAL to COALESCENT AIRSPACE DISEASE WITHIN THE LUNG PARENCHYMA. COMMONLY REPORT IMAGING FEATURES OF (COVID-19) PNEUMONIA ARE PRESENT. OTHER    PROCESSES SUCH AS INFLUENZA, PNEUMONIA AND ORGANIZING PNEUMONIA AS CAN BE SEEN WITH DRUG TOXICITY AND CONNECTIVE TISSUE DISEASE CAN CAUSE A SIMILAR IMAGING PATTERN. 3. MODERATE TO LARGE HIATAL HERNIA. 4. NODULE LEFT LOBE OF THYROID. CORRELATE CLINICALLY FOLLOW-UP      Other findings detailed above      All CT scans at this facility use dose modulation, iterative reconstruction, and/or weight based dosing when appropriate to reduce radiation dose to as low as reasonably achievable.             XR CHEST PORTABLE    (Results Pending)       LABS:  Labs Reviewed   COMPREHENSIVE METABOLIC PANEL - Abnormal; Notable for the following components:       Result Value    Sodium 133 (*)     Potassium 3.2 (*)     Chloride 89 (*)     Glucose 224 (*)     BUN 47 (*)     Calcium 10.6 (*)     All other components within normal limits   CBC WITH AUTO DIFFERENTIAL - Abnormal; Notable for the following components:    WBC 11.8 (*)     All other components within normal limits   D-DIMER, QUANTITATIVE - Abnormal; Notable for the following components:    D-Dimer, Quant 0.96 (*)     All other components within normal limits    Narrative:     CALL  [facility] tel. [phone],  DIMER results called to and read back by Mary Galvan, 08/18/2021 14:24, by  [facility]   POCT ARTERIAL - Abnormal; Notable for the following components:    POC Potassium 3.1 (*)     POC Chloride 96 (*)     POC Glucose 238 (*)     GFR Non- 59 (*)     pO2, Arterial 88 (*)     HCO3, Arterial 30.0 (*)     Base Excess, Arterial 6 (*)     O2 Sat, Arterial 97 (*)     TCO2, Arterial 31 (*)     Lactate 2.77 (*)     All other components within normal limits   POCT CREATININE - URINE - Normal   CULTURE, BLOOD 1   CULTURE, BLOOD 2   MAGNESIUM   TROPONIN   URINE RT REFLEX TO CULTURE       All other labs were within normal range or not returned as of this dictation. Please note, any cultures that may have been sent were not resulted at the time of this patient visit. EMERGENCY DEPARTMENT COURSE and Medical Decision Making:     Vitals:    Vitals:    08/18/21 1401 08/18/21 1430 08/18/21 1432 08/18/21 1500   BP: (!) 108/54 (!) 107/52  (!) 119/50   Pulse: 77 76  85   Resp: 20 24  25   Temp:       TempSrc:       SpO2: 97% 97% 96% 94%   Weight:       Height:           PROCEDURES: (None if blank)  Procedures       MDM     Patient is Covid positive. She has had an increase need of oxygen over the past few days. She was 75% on room air, 85% on 5 L, and is tolerating 50% high flow oxygen well. Patient says that she just feels fatigued but she feels a lot better with her breathing right now. Lab work is otherwise status quo. Her CTA does not show evidence for pulmonary embolus but does look like it is worsened since 5 days ago when it was done the first time. Patient will be admitted to the hospital in the ICU secondary to increased oxygen needs. Strict return precautions and follow up instructions were discussed with the patient with which the patient agrees    ED Medications administered this visit:    Medications   sodium chloride flush 0.9 % injection 10 mL (has no administration in time range)   iopamidol (ISOVUE-370) 76 % injection 100 mL (100 mLs Intravenous Given 8/18/21 5554)         FINAL IMPRESSION      1. COVID-19    2. Hypoxia          DISPOSITION/PLAN   DISPOSITION Decision To Admit 08/18/2021 03:46:11 PM      PATIENT REFERRED TO:  No follow-up provider specified.     DISCHARGE MEDICATIONS:  New Prescriptions    No medications on file              [id number] [facility] 9100 Leida Toro DO (electronically signed)  Attending Physician, Emergency Department          Zechariah Lopez DO  08/18/21 3602

## 2021-08-18 NOTE — PROGRESS NOTES
Renal Adjustment Per Protocol: famotidine 20 mg twice daily has been changed to famotidine 20 mg once daily based on:       Recent Labs     08/18/21  1459   CREATININE 0.9   Estimated Creatinine Clearance: 36 mL/min (based on SCr of 0.9 mg/dL). From LexiComp:   If usual dose is 20 mg twice daily and CrCl is 30 to <60; 20 mg once daily or 40 mg every other day. Please call pharmacy () with any questions or concerns. Thanks!     Yoly Pat, PharmD  PGY-1 Pharmacy Resident  8/18/2021 6:03 PM

## 2021-08-18 NOTE — CARE COORDINATION
[facility] Case Management   Initial Discharge Assessment    Met with patient at bedside in ER to discuss discharge plan. PCP: Lisa Glass MD                                  Date of Last Visit: none recent  If no PCP, list provided? N/A    Discharge Planning    Living Arrangements: Patient lives independently at home in a single-level house. Who do you live with? Patient lives alone. Who helps you with your care: Patient reports she is independent with ADLs and IADLs at baseline but that she has [place] kids who can help\" her if needed. If lives at home: Do you have any barriers navigating in your home? No barriers at baseline. Patient can perform ADL? Yes    Current Services (outpatient and in home): None at present. Patient [facility] go [facility] the [facility] on [facility]/13 for monoclonal antibody infusion. Dialysis: No    Is transportation available to get to your appointments? Yes - Patient's daughter transports her. DME Equipment: None. Patient states \"I've never needed anything before. \"    Respiratory equipment: PRN/HS Oxygen 2 Liters    Respiratory provider: Not known     Pharmacy: [facility] in [address] with Medication Assistance Program?  No      Patient agreeable to [facility]? Yes, [place]la    Patient agreeable to SNF/Rehab? No    Other discharge needs identified? Other - TBD based on response to treatment and needs at [facility]. Does Patient Have a High-Risk for Readmission Diagnosis (CHF, PN, MI, COPD)? Yes     History: htn, pulm htn, COVID pneumonia (new diagnosis)    Initial Discharge Plan? (Note: please see concurrent daily documentation for any updates after initial note). Home with [facility].     Readmission Risk              Risk of Unplanned Readmission:  0         Electronically signed by Delmar Jackman RN on 8/18/2021 at 4:17 PM

## 2021-08-18 NOTE — ED NOTES
Patient placed on High Flow, O2 Sat. 83%. Dr. Milady Whelan notified. Placed patient back on NRB at [address] [place] [address], O2 Sat.  97%     Ricky Cody RN  08/18/21 7294

## 2021-08-18 NOTE — ACP (ADVANCE CARE PLANNING)
Advance Care Planning     Advance Care Planning Activator (Inpatient)  Conversation Note      Date of ACP Conversation: 8/18/2021     Conversation Conducted with: Patient with Decision Making Capacity    ACP Activator: Carlo Parry RN    Health Care Decision Maker:     Current Designated Health Care Decision Maker:     Primary Decision Maker: Prabha Butt - Child - [phone] *     * Patient has seven children and she states they \"are all number one when it comes to decision making\" but that her daughter Otoniel Carlisle is the easiest to get in touch with and will notify all other children. Click here to complete Healthcare Decision Makers including section of the Healthcare Decision Maker Relationship (ie \"Primary\")      Care Preferences    No mechanical ventilation or CPR per the patient's wishes. Patient states all of her children are aware of this. Ventilation: \"If you were in your present state of health and suddenly became very ill and were unable to breathe on your own, what would your preference be about the use of a ventilator (breathing machine) if it were available to you? \"      Would the patient desire the use of ventilator (breathing machine)?: No, not even short-term. \"If your health worsens and it becomes clear that your chance of recovery is unlikely, what would your preference be about the use of a ventilator (breathing machine) if it were available to you? \"     Would the patient desire the use of ventilator (breathing machine)?: No    Resuscitation  \"CPR works best to restart the heart when there is a sudden event, like a heart attack, in someone who is otherwise healthy. Unfortunately, CPR does not typically restart the heart for people who have serious health conditions or who are very sick. \"    \"In the event your heart stopped as a result of an underlying serious health condition, would you want attempts to be made to restart your heart (answer \"yes\" for attempt to resuscitate) or would you prefer a natural death (answer \"no\" for do not attempt to resuscitate)? \" no     [x] Yes   [] No   Educated Patient / Anali Correa regarding differences between Advance Directives and portable DNR orders. Length of ACP Conversation in minutes:      Conversation Outcomes:  [x] ACP discussion completed  [] Existing advance directive reviewed with patient; no changes to patient's previously recorded wishes  [] New Advance Directive completed  [] Portable Do Not Rescitate prepared for Provider review and signature  [] POLST/POST/MOLST/MOST prepared for Provider review and signature    Follow-up plan:    [x] Schedule follow-up conversation to continue planning  [x] Referred individual to Provider for additional questions/concerns   [] Advised patient/agent/surrogate to review completed ACP document and update if needed with changes in condition, patient preferences or care setting    [x] This note routed to one or more involved healthcare providers- PCP.

## 2021-08-18 NOTE — ED TRIAGE NOTES
Pt presents to ED via EMS c/o SOB and +COVID. Per EMS, patient was 82% on 4L BNC, then placed on NRB @ 15L. Patient states she intermittently wears 4L BNC, the SOB started worsening Friday. Patient states +COVID and unvaccinated.   Patient denies chest pain, fever, chills, N/V

## 2021-08-18 NOTE — ED NOTES
Patient sitting in ED cot, no distress noted, resp even and unlabored. No patient concerns voiced. Will continue to monitor.      Whit Maldonado RN  08/18/21 4675

## 2021-08-18 NOTE — H&P
Smoker     Quit date: 1986     Years since quittin.6    Smokeless tobacco: Never Used   Substance and Sexual Activity    Alcohol use: No    Drug use: No    Sexual activity: Not on file   Other Topics Concern    Not on file   Social History Narrative    Not on file     Social Determinants of Health     Financial Resource Strain:     Difficulty of Paying Living Expenses:    Food Insecurity:     Worried About Running Out of Food in the Last Year:     [address] in the Last Year:    Transportation Needs:     Lack of Transportation (Medical):  Lack of Transportation (Non-Medical):    Physical Activity:     Days of Exercise per Week:     Minutes of Exercise per Session:    Stress:     Feeling of Stress :    Social Connections:     Frequency of Communication with Friends and Family:     Frequency of Social Gatherings with Friends and Family:     Attends [employer] Services:     Active Member of Clubs or Organizations:     Attends Club or Organization Meetings:     Marital Status:    Intimate Partner Violence:     Fear of Current or Ex-Partner:     Emotionally Abused:     Physically Abused:     Sexually Abused:        Family History:   Family History   Problem Relation Age of Onset    Heart Disease Mother     Heart Disease Father     Cancer Other         breast       Medications Prior to Admission:    Prior to Admission medications    Medication Sig Start Date End Date Taking?  Authorizing Provider   dexamethasone (DECADRON) 6 MG tablet Take 1 tablet by mouth daily (with breakfast) for 10 days 21  Lizzette Martinez, DO   budesonide-formoterol (SYMBICORT) 160-4.5 MCG/ACT AERO Inhale 2 puffs into the lungs 2 times daily 21   Lizzy Martinez DO   potassium chloride (KLOR-CON M20) 20 MEQ extended release tablet  17   Historical Provider, MD   Coenzyme Q10 (CO Q 10 PO) Take by mouth    Historical Provider, MD   Denosumab (PROLIA SC) Inject into the skin Historical Provider, MD   OXYGEN Please supply patient with a portable oxygen concentrator 3/21/18   Douglas Loja PA-C   aspirin 81 MG EC tablet Take 1 tablet by mouth 2 times daily 17   NORI Wang - CNP   Magnesium 400 MG CAPS Take by mouth    Historical Provider, MD   Probiotic Product (PROBIOTIC & ACIDOPHILUS EX ST) CAPS Take by mouth    Historical Provider, MD   Multiple Vitamins-Minerals (PRESERVISION AREDS 2+MULTI VIT PO) Take by mouth    Historical Provider, MD   metoprolol succinate ER (TOPROL XL) 50 MG XL tablet Take one(1) tablet daily. 10/21/03   Historical Provider, MD   amLODIPine (NORVASC) 5 MG tablet  16   Historical Provider, MD   chlorthalidone (HYGROTON) 25 MG tablet TAKE 1 TABLET BY MOUTH DAILY 16   Historical Provider, MD   pravastatin (PRAVACHOL) 40 MG tablet  16   Historical Provider, MD   vitamin D (CHOLECALCIFEROL) 1000 UNIT TABS tablet Take 1,000 Units by mouth daily    Historical Provider, MD   Omega-3 Fatty Acids (FISH OIL PO) Take by mouth    Historical Provider, MD       Allergies:  Bisphosphonates, Sulfa antibiotics, and Lisinopril    REVIEW OF SYSTEMS:  All systems reviewed and negative except for what is in HPI. PHYSICAL EXAM:  Vitals:  /88   Pulse 86   Temp 97.6 °F (36.4 °C) (Oral)   Resp 20   Ht 5' 3\" (1.6 m)   Wt 137 lb (62.1 kg)   SpO2 95%   BMI 24.27 kg/m²   BMI Classification: Normal Weight (BMI 18.5-24. 9)  Pulse Ox: SpO2  Av.9 %  Min: 85 %  Max: 97 %  Supplemental O2: O2 Flow Rate (L/min): 55 L/min    CONSTITUTIONAL:  awake, alert, cooperative, no apparent distress, and appears stated age  HEENT: Normocephalic, PERRLA  NECK: no JVD, no LAD  HEART: RRR, no murmurs, gallops, or rubs  LUNGS: diminished bilaterally, no wheezes, crackles, or rhonchi.   ABDOMEN: soft/NT/ND, positive BS  MUSCULOSKELETAL: negative for edema, +2 pulses  SKIN: intact without rash or jaundice  NEURO:  CN intact and no focal deficits    DATA:  Recent Results (from the past 24 hour(s))   Comprehensive Metabolic Panel    Collection Time: 08/18/21  1:45 PM   Result Value Ref Range    Sodium 133 (L) 135 - 144 mEq/L    Potassium 3.2 (L) 3.4 - 4.9 mEq/L    Chloride 89 (L) 95 - 107 mEq/L    CO2 30 20 - 31 mEq/L    Anion Gap 14 9 - 15 mEq/L    Glucose 224 (H) 70 - 99 mg/dL    BUN 47 (H) 8 - 23 mg/dL    CREATININE 0.67 0.50 - 0.90 mg/dL    GFR Non-African American >60.0 >60    GFR  >60.0 >60    Calcium 10.6 (H) 8.5 - 9.9 mg/dL    Total Protein 6.7 6.3 - 8.0 g/dL    Albumin 3.7 3.5 - 4.6 g/dL    Total Bilirubin 0.7 0.2 - 0.7 mg/dL    Alkaline Phosphatase 94 40 - 130 U/L    ALT 30 0 - 33 U/L    AST 25 0 - 35 U/L    Globulin 3.0 2.3 - 3.5 g/dL   CBC Auto Differential    Collection Time: 08/18/21  1:45 PM   Result Value Ref Range    WBC 11.8 (H) 4.8 - 10.8 K/uL    RBC 4.81 4.20 - 5.40 M/uL    Hemoglobin 14.7 12.0 - 16.0 g/dL    Hematocrit 42.5 37.0 - 47.0 %    MCV 88.3 82.0 - 100.0 fL    MCH 30.6 27.0 - 31.3 pg    MCHC 34.7 33.0 - 37.0 %    RDW 12.4 11.5 - 14.5 %    Platelets 775 484 - 284 K/uL   Magnesium    Collection Time: 08/18/21  1:45 PM   Result Value Ref Range    Magnesium 1.7 1.7 - 2.4 mg/dL   Troponin    Collection Time: 08/18/21  1:45 PM   Result Value Ref Range    Troponin <0.010 0.000 - 0.010 ng/mL   D-Dimer, Quantitative    Collection Time: 08/18/21  1:45 PM   Result Value Ref Range    D-Dimer, Quant 0.96 (HH) 0.00 - 0.50 mg/L FEU   EKG 12 Lead - Chest Pain    Collection Time: 08/18/21  1:45 PM   Result Value Ref Range    Ventricular Rate 90 BPM    Atrial Rate 90 BPM    P-R Interval 160 ms    QRS Duration 82 ms    Q-T Interval 354 ms    QTc Calculation (Bazett) 433 ms    P Axis 23 degrees    R Axis -31 degrees    T Axis 6 degrees   POCT CREATININE    Collection Time: 08/18/21  1:51 PM   Result Value Ref Range    POC CREATININE WHOLE BLOOD 0.7    POCT Arterial    Collection Time: 08/18/21  2:59 PM   Result Value Ref Range    POC Sodium 137 136 - 145 mEq/L    POC Potassium 3.1 (L) 3.5 - 5.1 mEq/L    POC Chloride 96 (L) 99 - 110 mEq/L    POC Glucose 238 (H) 60 - 115 mg/dl    POC Creatinine 0.9 0.6 - 1.2 mg/dL    GFR Non- 59 (A) >60    GFR African American >60 >60    Calcium, Ion 1.32 1.12 - 1.32 mmol/L    pH, Arterial 7.443 7.350 - 7.450    pCO2, Arterial 44 35 - 45 mm Hg    pO2, Arterial 88 (HH) 75 - 108 mm Hg    HCO3, Arterial 30.0 (H) 21.0 - 29.0 mmol/L    Base Excess, Arterial 6 (H) -3 - 3    O2 Sat, Arterial 97 (HH) 93 - 100 %    TCO2, Arterial 31 (H) 22 - 29    Lactate 2.77 (H) 0.40 - 2.00 mmol/L    POC Hematocrit 40 36 - 48 %    Hemoglobin 13.6 12.0 - 16.0 gm/dL    FIO2 75.000     Sample Type ART     Performed on SEE BELOW            ASSESSMENT AND PLAN:    Acute hypoxic respiratory failure due to bilateral COVID-19 pneumonia  COVID-19 pneumonia  Hypertension  Hyperlipidemia  Pulmonary hypertension    Plan  1. We will admit the patient to intensive care unit for close monitoring until improvement of her saturation  2. Consult intensivist  3. Start the patient on remdesivir  4. Start Decadron 6 mg daily  5. Inhaled treatments  6. Resume home medications  7. Lovenox 30 mg twice daily  8. Full code  9.  Regular diet         DISCHARGE PLANNING  ICU, TBD    Code status: Full code  Electronically signed by Kendal Zhong DO on 8/18/21 at 4:01 PM EDT

## 2021-08-18 NOTE — ED NOTES
Spoke with patient's daughter via phone, Susan Stevens at [phone], and updated her on the patient's plan of care. Placed a phone in patient's room so she is able to speak with her daughter.      Nj Bishop RN  08/18/21 [id number]

## 2021-08-19 PROBLEM — J12.82 PNEUMONIA DUE TO COVID-19 VIRUS: Status: ACTIVE | Noted: 2021-08-18

## 2021-08-19 LAB
ALBUMIN SERPL-MCNC: 3.2 G/DL (ref 3.5–4.6)
ALP BLD-CCNC: 80 U/L (ref 40–130)
ALT SERPL-CCNC: 25 U/L (ref 0–33)
ANION GAP SERPL CALCULATED.3IONS-SCNC: 14 MEQ/L (ref 9–15)
AST SERPL-CCNC: 19 U/L (ref 0–35)
BASOPHILS ABSOLUTE: 0 K/UL (ref 0–0.2)
BASOPHILS RELATIVE PERCENT: 0 %
BILIRUB SERPL-MCNC: 0.6 MG/DL (ref 0.2–0.7)
BUN BLDV-MCNC: 41 MG/DL (ref 8–23)
CALCIUM SERPL-MCNC: 9.7 MG/DL (ref 8.5–9.9)
CHLORIDE BLD-SCNC: 94 MEQ/L (ref 95–107)
CO2: 27 MEQ/L (ref 20–31)
CREAT SERPL-MCNC: 0.67 MG/DL (ref 0.5–0.9)
EOSINOPHILS ABSOLUTE: 0 K/UL (ref 0–0.7)
EOSINOPHILS RELATIVE PERCENT: 0 %
GFR AFRICAN AMERICAN: >60
GFR AFRICAN AMERICAN: >60
GFR NON-AFRICAN AMERICAN: >60
GFR NON-AFRICAN AMERICAN: >60
GLOBULIN: 2.9 G/DL (ref 2.3–3.5)
GLUCOSE BLD-MCNC: 328 MG/DL (ref 70–99)
HCT VFR BLD CALC: 38.2 % (ref 37–47)
HEMOGLOBIN: 13.1 G/DL (ref 12–16)
LYMPHOCYTES ABSOLUTE: 0.2 K/UL (ref 1–4.8)
LYMPHOCYTES RELATIVE PERCENT: 5.1 %
MCH RBC QN AUTO: 30.3 PG (ref 27–31.3)
MCHC RBC AUTO-ENTMCNC: 34.4 % (ref 33–37)
MCV RBC AUTO: 88.2 FL (ref 82–100)
MONOCYTES ABSOLUTE: 0.2 K/UL (ref 0.2–0.8)
MONOCYTES RELATIVE PERCENT: 3.3 %
NEUTROPHILS ABSOLUTE: 4.4 K/UL (ref 1.4–6.5)
NEUTROPHILS RELATIVE PERCENT: 91.6 %
PDW BLD-RTO: 12.4 % (ref 11.5–14.5)
PERFORMED ON: NORMAL
PLATELET # BLD: 299 K/UL (ref 130–400)
POC CREATININE: 0.7 MG/DL (ref 0.6–1.2)
POC SAMPLE TYPE: NORMAL
POTASSIUM REFLEX MAGNESIUM: 4.1 MEQ/L (ref 3.4–4.9)
RBC # BLD: 4.33 M/UL (ref 4.2–5.4)
SODIUM BLD-SCNC: 135 MEQ/L (ref 135–144)
TOTAL PROTEIN: 6.1 G/DL (ref 6.3–8)
WBC # BLD: 4.8 K/UL (ref 4.8–10.8)

## 2021-08-19 PROCEDURE — 36415 COLL VENOUS BLD VENIPUNCTURE: CPT

## 2021-08-19 PROCEDURE — 6360000002 HC RX W HCPCS: Performed by: INTERNAL MEDICINE

## 2021-08-19 PROCEDURE — 2580000003 HC RX 258: Performed by: INTERNAL MEDICINE

## 2021-08-19 PROCEDURE — 6370000000 HC RX 637 (ALT 250 FOR IP): Performed by: INTERNAL MEDICINE

## 2021-08-19 PROCEDURE — 2060000000 HC ICU INTERMEDIATE R&B

## 2021-08-19 PROCEDURE — 94761 N-INVAS EAR/PLS OXIMETRY MLT: CPT

## 2021-08-19 PROCEDURE — 2700000000 HC OXYGEN THERAPY PER DAY

## 2021-08-19 PROCEDURE — 80053 COMPREHEN METABOLIC PANEL: CPT

## 2021-08-19 PROCEDURE — 1210000000 HC MED SURG R&B

## 2021-08-19 PROCEDURE — 2500000003 HC RX 250 WO HCPCS: Performed by: INTERNAL MEDICINE

## 2021-08-19 PROCEDURE — 85025 COMPLETE CBC W/AUTO DIFF WBC: CPT

## 2021-08-19 PROCEDURE — 99223 1ST HOSP IP/OBS HIGH 75: CPT | Performed by: INTERNAL MEDICINE

## 2021-08-19 PROCEDURE — 94664 DEMO&/EVAL PT USE INHALER: CPT

## 2021-08-19 PROCEDURE — 99222 1ST HOSP IP/OBS MODERATE 55: CPT | Performed by: INTERNAL MEDICINE

## 2021-08-19 RX ORDER — ALBUTEROL SULFATE 90 UG/1
2 AEROSOL, METERED RESPIRATORY (INHALATION) 3 TIMES DAILY
Status: DISCONTINUED | OUTPATIENT
Start: 2021-08-19 | End: 2021-08-26 | Stop reason: HOSPADM

## 2021-08-19 RX ORDER — ALBUTEROL SULFATE 90 UG/1
2 AEROSOL, METERED RESPIRATORY (INHALATION)
Status: DISCONTINUED | OUTPATIENT
Start: 2021-08-19 | End: 2021-08-26 | Stop reason: HOSPADM

## 2021-08-19 RX ADMIN — ENOXAPARIN SODIUM 30 MG: 30 INJECTION SUBCUTANEOUS at 08:56

## 2021-08-19 RX ADMIN — Medication 10 ML: at 20:49

## 2021-08-19 RX ADMIN — ACETAMINOPHEN 650 MG: 325 TABLET ORAL at 14:14

## 2021-08-19 RX ADMIN — Medication 2000 UNITS: at 08:56

## 2021-08-19 RX ADMIN — ASPIRIN 81 MG: 81 TABLET, COATED ORAL at 20:41

## 2021-08-19 RX ADMIN — REMDESIVIR 100 MG: 100 INJECTION, POWDER, LYOPHILIZED, FOR SOLUTION INTRAVENOUS at 20:42

## 2021-08-19 RX ADMIN — Medication 10 ML: at 08:57

## 2021-08-19 RX ADMIN — ASPIRIN 81 MG: 81 TABLET, COATED ORAL at 08:56

## 2021-08-19 RX ADMIN — ENOXAPARIN SODIUM 30 MG: 30 INJECTION SUBCUTANEOUS at 20:41

## 2021-08-19 RX ADMIN — PRAVASTATIN SODIUM 40 MG: 40 TABLET ORAL at 20:41

## 2021-08-19 RX ADMIN — METOPROLOL SUCCINATE 50 MG: 50 TABLET, FILM COATED, EXTENDED RELEASE ORAL at 08:56

## 2021-08-19 RX ADMIN — DEXAMETHASONE SODIUM PHOSPHATE 6 MG: 4 INJECTION, SOLUTION INTRAMUSCULAR; INTRAVENOUS at 17:50

## 2021-08-19 RX ADMIN — FAMOTIDINE 20 MG: 10 INJECTION INTRAVENOUS at 08:56

## 2021-08-19 ASSESSMENT — PAIN DESCRIPTION - PAIN TYPE: TYPE: CHRONIC PAIN

## 2021-08-19 ASSESSMENT — PAIN SCALES - GENERAL
PAINLEVEL_OUTOF10: 0
PAINLEVEL_OUTOF10: 10
PAINLEVEL_OUTOF10: 0
PAINLEVEL_OUTOF10: 0

## 2021-08-19 ASSESSMENT — ENCOUNTER SYMPTOMS
GASTROINTESTINAL NEGATIVE: 1
COUGH: 1
EYES NEGATIVE: 1
SHORTNESS OF BREATH: 1

## 2021-08-19 ASSESSMENT — PAIN DESCRIPTION - ORIENTATION: ORIENTATION: RIGHT;LEFT

## 2021-08-19 ASSESSMENT — PAIN DESCRIPTION - DESCRIPTORS: DESCRIPTORS: CONSTANT;ACHING

## 2021-08-19 ASSESSMENT — PAIN DESCRIPTION - LOCATION: LOCATION: LEG

## 2021-08-19 NOTE — CONSULTS
Pulmonary and Critical Care Medicine  Consult Note  Encounter Date: 2021 8:34 AM    Ms. Dane Velazquez is a 80 y.o. female  : 1932  Requesting Provider: Breana Gates DO    Reason for request: COVID-            HISTORY OF PRESENT ILLNESS:    Patient is 80 y.o. presents with worsening shortness of breath symptoms started earlier this week on Monday, with minimal cough, dry, no chest pain, no fever, shortness of breath progressively worse, patient currently on 2 L O2 at home mainly at night, patient received monoclonal antibody infusion on the , progressively she required more O2 supplement up to 3 L continuously on arrival to ED she was saturating 75% on room air patient currently on high flow 50L and 50%           Past Medical History:        Diagnosis Date    COVID-19 2021    HTN (hypertension) 2017    Hyperlipidemia     Hypertension     Macular degeneration     Osteoporosis     PONV (postoperative nausea and vomiting)     Pulmonary hypertension ([address]     Right knee DJD 2017       Past Surgical History:        Procedure Laterality Date    APPENDECTOMY      HYSTERECTOMY      OTHER SURGICAL HISTORY Right 2016    EXCISION UPPER BACK MASS    [place] TOTAL KNEE ARTHROPLASTY Right 9/15/2017    RIGHT  KNEE TOTAL ARTHROPLASTY performed by Reg Aquino MD at [facility] History:     reports that she quit smoking about 35 years ago. She has never used smokeless tobacco. She reports that she does not drink alcohol and does not use drugs.     Family History:       Problem Relation Age of Onset    Heart Disease Mother     Heart Disease Father     Cancer Other         breast       Allergies:  Bisphosphonates, Sulfa antibiotics, and Lisinopril        MEDICATIONS during current hospitalization:    Continuous Infusions:   sodium chloride         Scheduled Meds:   sodium chloride flush  5-40 mL Intravenous 2 times per day    dexamethasone  6 mg Intravenous Q24H    Vitamin D  2,000 Units Oral Daily    enoxaparin  30 mg Subcutaneous BID    famotidine (PEPCID) injection  20 mg Intravenous Daily    aspirin  81 mg Oral BID    metoprolol succinate  50 mg Oral Daily    pravastatin  40 mg Oral Nightly    remdesivir IVPB  100 mg Intravenous Q24H       PRN Meds:sodium chloride flush, sodium chloride, ondansetron **OR** ondansetron, polyethylene glycol, acetaminophen **OR** acetaminophen, guaiFENesin-dextromethorphan, albuterol sulfate HFA        REVIEW OF SYSTEMS:  ROS: 10 organs review of system is done including general, psychological, ENT, hematological, endocrine, respiratory, cardiovascular, gastrointestinal, musculoskeletal, neurological,  allergy and Immunology is done and is otherwise negative. PHYSICAL EXAM:    Vitals:  BP (!) 130/41   Pulse 71   Temp 98.6 °F (37 °C) (Oral)   Resp 25   Ht 5' 3\" (1.6 m)   Wt 135 lb (61.2 kg)   SpO2 92%   BMI 23.91 kg/m²     General: alert, cooperative, no distress  Head: normocephalic, atraumatic  Eyes:No gross abnormalities. ENT:  MMM no lesions  Neck:  supple and no masses  Chest : clear to auscultation bilaterally- no wheezes, rales or rhonchi, normal air movement, no respiratory distress  Heart[de-identified] Heart sounds are normal.  Regular rate and rhythm without murmur, gallop or rub. ABD:  symmetric, soft, non-tender, no guarding or rebound  Musculoskeletal : no cyanosis, no clubbing and no edema  Neuro:  Grossly normal  Skin: No rashes or nodules noted.   Lymph node:  no cervical nodes  Urology: No Santana   Psychiatric: appropriate        Data Review  Recent Labs     08/18/21  1345 08/18/21  1459 08/19/21  0509   WBC 11.8*  --  4.8   HGB 14.7 13.6 13.1   HCT 42.5  --  38.2     --  299      Recent Labs     08/18/21  1345 08/18/21  1351 08/18/21  1459 08/19/21  0509   *  --   --  135   K 3.2*  --   --  4.1   CL 89*  --   --  94*   CO2 30  --   --  27   BUN 47*  --   --  41*   CREATININE 0.67 0.7 0.9 0.67   GLUCOSE 224* --   --  328*       MV Settings:           ABGs:   Recent Labs     08/18/21  1459   PHART 7.443   EJX8LTE 44   PO2ART 88*   PYO2QUP 30.0*   BEART 6*   [id number] 97*   UOB5YJO 31*     O2 Device: Heated high flow cannula  O2 Flow Rate (L/min): 50 L/min  No results found for: [address]    Radiology  I personally reviewed imaging studies and CT chest shows groundglass changes consistent with COVID-19 infection, no PE,  and large hiatal hernia        Assessment, plan:   Patient is at risk due to    · Acute hypoxic respiratory failure  · Secondary to COVID-19 pneumonia  · History of pulmonary hypertension  · Nocturnal hypoxia     Recommendation  · Continue O2 target sat 93 to 95%  · Dexamethasone 6 mg p.o. daily  · Remdesivir for 5 days  · Incentive spirometry  · Prone position encouraged  · Watch volume status, maintain euvolemic  · If O2 requirement remains steady will consider transferring to stepdown unit today    Thank you for consultation    Electronically signed by Lazaro Ramirez MD, FCCP,  on 8/19/2021 at 8:34 AM

## 2021-08-19 NOTE — PROGRESS NOTES
[facility] Respiratory Therapy Evaluation   Current Order:  Alb mdi q6 prn      Home Regimen: n      Ordering Physician: chris  Re-evaluation Date:  8/22     Diagnosis: covid      Patient Status: Stable / Unstable + Physician notified    The following MDI Criteria must be met in order to convert aerosol to MDI with spacer. If unable to meet, MDI will be converted to aerosol:  []  Patient able to demonstrate the ability to use MDI effectively  []  Patient alert and cooperative  []  Patient able to take deep breath with 5-10 second hold  []  Medication(s) available in this delivery method   []  Peak flow greater than or equal to 200 ml/min            Current Order Substituted To  (same drug, same frequency)   Aerosol to MDI [] Albuterol Sulfate 0.083% unit dose by aerosol Albuterol Sulfate MDI 2 puffs by inhalation with spacer    [] Levalbuterol 1.25 mg unit dose by aerosol Levalbuterol MDI 2 puffs by inhalation with spacer    [] Levalbuterol 0.63 mg unit dose by aerosol Levalbuterol MDI 2 puffs by inhalation with spacer    [] Ipratropium Bromide 0.02% unit dose by aerosol Ipratropium Bromide MDI 2 puffs by inhalation with spacer    [] Duoneb (Ipratropium + Albuterol) unit dose by aerosol Ipratropium MDI + Albuterol MDI 2 puffs by inhalation w/spacer   MDI to Aerosol [] Albuterol Sulfate MDI Albuterol Sulfate 0.083% unit dose by aerosol    [] Levalbuterol MDI 2 puffs by inhalation Levalbuterol 1.25 mg unit dose by aerosol    [] Ipratropium Bromide MDI by inhalation Ipratropium Bromide 0.02% unit dose by aerosol    [] Combivent (Ipratropium + Albuterol) MDI by inhalation Duoneb (Ipratropium + Albuterol) unit dose by aerosol       Treatment Assessment [Frequency/Schedule]:  Change frequency to: ___alb mdi tid & q2 prn_______________________________________________per Protocol, P&T, MEC      Points 0 1 2 3 4   Pulmonary Status  Non-Smoker  []   Smoking history   < 20 pack years  []   Smoking history  ?  20 pack years  []   Pulmonary Disorder  (acute or chronic)  [x]   Severe or Chronic w/ Exacerbation  []     Surgical Status No [x]   Surgeries     General []   Surgery Lower []   Abdominal Thoracic or []   Upper Abdominal Thoracic with  PulmonaryDisorder  []     Chest X-ray Clear/Not  Ordered     []  Chronic Changes  Results Pending  []  Infiltrates, atelectasis, pleural effusion, or edema  [x]  Infiltrates in more than one lobe []  Infiltrate + Atelectasis, &/or pleural effusion  []    Respiratory Pattern Regular,  RR = 12-20 [x]  Increased,  RR = 21-25 []  BEAN, irregular,  or RR = 26-30 []  Decreased FEV1  or RR = 31-35 []  Severe SOB, use  of accessory muscles, or RR ? 35  []    Mental Status Alert, oriented,  Cooperative [x]  Confused but Follows commands []  Lethargic or unable to follow commands []  Obtunded  []  Comatose  []    Breath Sounds Clear to  auscultation  []  Decreased unilaterally or  in bases only []  Decreased  bilaterally  [x]  Crackles or intermittent wheezes []  Wheezes []    Cough Strong, Spontan., & nonproductive []  Strong,  spontaneous, &  productive []  Weak,  Nonproductive []  Weak, productive or  with wheezes []  No spontaneous  cough or may require suctioning []    Level of Activity Ambulatory []  Ambulatory w/ Assist  [x]  Non-ambulatory []  Paraplegic []  Quadriplegic []    Total    Score:__8_____     Triage Score:__4______      Tri       Triage:     1. (>20) Freq: Q3    2. (16-20) Freq: Q4   3. (11-15) Freq: QID & Albuterol Q2 PRN    4. (6-10) Freq: TID & Albuterol Q2 PRN    5. (0-5) Freq Q4prn

## 2021-08-19 NOTE — FLOWSHEET NOTE
56- Was in with patient when daughter Nessa Sommer in. She was updated per patient's request. Daughter would like Dr Charlene Barragan to call her. Message sent to him with name and number. Patient was repositioned in bed and medicated with Tylenol for leg pain. Pulse ox only at 90% on high flow.  Respiratory in to see patient and she's now at 95%    1640- Pt assisted up to bedside commode and helped back into bed    1810- Pt resting in bed with eyes closed Electronically signed by Mary Paz RN on 8/19/2021 at 6:28 PM

## 2021-08-19 NOTE — CONSULTS
Infectious Disease     Patient Name: Paul Guallpa  Date: 8/19/2021  YOB: 1932  Medical Record Number: [id number]      COVID-19 pneumonia        History of Present Illness:  Macular degeneration    Tested positive for COVID-19 13 2021  Return to the emergency room with worsening shortness of breath increasing dyspnea on exertion room air saturation 75% on presentation x-ray showing increasing infiltrates  After her ER visit she was to receive monoclonal antibody as an outpatient  Patient is not vaccinated        The EXAMINATION: CT scan of the chest with contrast (pulmonary embolism protocol)       INDICATION: Chest pain and shortness of breath.       COMPARISON: None       TECHNIQUE: Helical CT was performed through the chest utilizing 100 cc of Isovue-300 intravenous contrast.  Images were obtained with bolus tracking in order to opacify the pulmonary arteries.  Both MIP and 3D volume rendered reconstructions were    performed.       FINDINGS: There are no findings central, proximal proximal-segmental pulmonary emboli.       There is mild to moderate emphysematous disease. There is areas of patchy multifocal to coalescent airspace disease throughout the lung parenchyma which has shown interval progression, worsening as compared to the prior examination. No pleural effusions. No pneumothoraces.       There is nonspecific subcentimeter periaortic adenopathy. There is pretracheal adenopathy. There is bilateral perihilar adenopathy. No significant subcarinal adenopathy.       In the field-of-view the abdomen is a moderate to large hiatal hernia.       There is multilevel degenerative changes with bridging osteophytes of the thoracic spine superpose upon a mild dorsal kyphosis.       Nodule left lobe of thyroid measuring approximately 9 mm.               Impression   1. NO FINDINGS CENTRAL, PROXIMAL OR PROXIMAL- SEGMENTAL PULMONARY EMBOLI. .     2.  THERE IS MILD EMPHYSEMATOUS DISEASE AND CHRONIC INTERSTITIAL CHANGES WITH WORSENING AREAS OF PATCHY MULTIFOCAL to COALESCENT AIRSPACE DISEASE WITHIN THE LUNG PARENCHYMA. COMMONLY REPORT IMAGING FEATURES OF (COVID-19) PNEUMONIA ARE PRESENT. OTHER    PROCESSES SUCH AS INFLUENZA, PNEUMONIA AND ORGANIZING PNEUMONIA AS CAN BE SEEN WITH DRUG TOXICITY AND CONNECTIVE TISSUE DISEASE CAN CAUSE A SIMILAR IMAGING PATTERN. 3. MODERATE TO LARGE HIATAL HERNIA. 4. NODULE LEFT LOBE OF THYROID. CORRELATE CLINICALLY FOLLOW-UP       Other findings detailed above       EXAMINATION: Portable AP ERECT view of the chest.       CLINICAL HISTORY:  sob, hypoxia , Positive Covid-19 test.        DATE: 2021 2:01 PM       COMPARISONS: 2021       FINDINGS: The heart is mildly enlarged, unchanged. There are atherosclerotic calcifications in the aortic arch. There are peripheral groundglass infiltrates in the mid lungs in lung bases. Infiltrates mildly increased since prior exam. Findings may be    secondary to viral pneumonia. Patient also be secondary to fluid overload or congestive heart failure. No pleural effusion or pneumothorax.  The bony thorax is unremarkable.           Impression   PERIPHERAL GROUNDGLASS INFILTRATES IN BOTH LUNGS, PRIMARILY IN THE MIDLUNGS AND LUNG BASES. MILD INCREASING INFILTRATES. DIFFERENTIAL DIAGNOSIS INCLUDES FLUID OVERLOAD, CHF OR VIRAL PNEUMONIA. Review of Systems   Constitutional: Negative for chills, diaphoresis, fatigue and fever. HENT: Negative. Eyes: Negative. Respiratory: Positive for cough and shortness of breath. Cardiovascular: Negative. Gastrointestinal: Negative. Endocrine: Negative. Genitourinary: Negative. Musculoskeletal: Negative. Skin: Negative. Neurological: Negative.         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      Current Outpatient Medications on File Prior to Encounter   Medication Sig Dispense Refill    dexamethasone (DECADRON) 6 MG tablet Take 1 tablet by mouth daily (with breakfast) for 10 days 7 tablet 0    budesonide-formoterol (SYMBICORT) 160-4.5 MCG/ACT AERO Inhale 2 puffs into the lungs 2 times daily 3 Inhaler 1    potassium chloride (KLOR-CON M20) 20 MEQ extended release tablet 20 mEq daily       Coenzyme Q10 (CO Q 10 PO) Take by mouth      OXYGEN Please supply patient with a portable oxygen concentrator 1 Units 0    aspirin 81 MG EC tablet Take 1 tablet by mouth 2 times daily (Patient taking differently: Take 81 mg by mouth daily ) 60 tablet 0    Magnesium 400 MG CAPS Take by mouth 2 times daily       Probiotic Product (PROBIOTIC & ACIDOPHILUS EX ST) CAPS Take by mouth      Multiple Vitamins-Minerals (PRESERVISION AREDS 2+MULTI VIT PO) Take by mouth      metoprolol succinate ER (TOPROL XL) 50 MG XL tablet Take 25 mg by mouth 2 times daily       amLODIPine (NORVASC) 5 MG tablet Take 5 mg by mouth daily At night      chlorthalidone (HYGROTON) 25 MG tablet TAKE 1 TABLET BY MOUTH DAILY  3    pravastatin (PRAVACHOL) 40 MG tablet       vitamin D (CHOLECALCIFEROL) 1000 UNIT TABS tablet Take 1,000 Units by mouth daily      Omega-3 Fatty Acids (FISH OIL PO) Take by mouth      Denosumab (PROLIA SC) Inject into the skin         Allergies   Allergen Reactions    Bisphosphonates Swelling    Sulfa Antibiotics Swelling    Lisinopril Other (See Comments)     ACE inhibitors make her cough         Family History   Problem Relation Age of Onset    Heart Disease Mother     Heart Disease Father     Cancer Other         breast         Physical Exam:      Physical Exam  Constitutional:       Appearance: She is ill-appearing. HENT:      Head: Normocephalic and atraumatic. Mouth/Throat:      Pharynx: No oropharyngeal exudate. Eyes:      Extraocular Movements: Extraocular movements intact. Neck:      Vascular: No carotid bruit. Cardiovascular:      Heart sounds: Normal heart sounds. No murmur heard. Pulmonary:      Effort: Pulmonary effort is normal. No respiratory distress. Breath sounds: Normal breath sounds. No stridor. No wheezing, rhonchi or rales. Chest:      Chest wall: No tenderness. Abdominal:      General: Abdomen is flat. Bowel sounds are normal. There is no distension. Palpations: There is no mass. Tenderness: There is no abdominal tenderness. There is no right CVA tenderness, left CVA tenderness, guarding or rebound. Hernia: No hernia is present. Musculoskeletal:         General: No swelling, tenderness, deformity or signs of injury. Normal range of motion. Cervical back: Normal range of motion and neck supple. No rigidity or tenderness. Right lower leg: No edema. Left lower leg: No edema. Lymphadenopathy:      Cervical: No cervical adenopathy. Skin:     General: Skin is warm and dry. Coloration: Skin is not jaundiced or pale. Findings: No bruising, erythema, lesion or rash. Neurological:      Mental Status: She is alert.          Blood pressure (!) 142/54, pulse 68, temperature 97.2 °F (36.2 °C), temperature source Oral, resp. rate 20, height 5' 3\" (1.6 m), weight 135 lb (61.2 kg), SpO2 95 %, not currently breastfeeding.       .   Lab Results   Component Value Date    WBC 4.8 08/19/2021    HGB 13.1 08/19/2021    HCT 38.2 08/19/2021    MCV 88.2 08/19/2021     08/19/2021     Lab Results   Component Value Date     08/19/2021    K 4.1 08/19/2021    CL 94 08/19/2021    CO2 27 08/19/2021    BUN 41 08/19/2021    CREATININE 0.67 08/19/2021    GLUCOSE 328 08/19/2021    CALCIUM 9.7 08/19/2021                ASSESSMENT:  Patient Active Problem List   Diagnosis    Degeneration of intervertebral disc of lumbosacral region    Age related osteoporosis    Right knee DJD    Hyperlipidemia    HTN (hypertension)    Macular degeneration    Chronic obstructive pulmonary disease ([address]    Legal blindness    Pseudophakia    Scotoma involving central area, bilateral    Thoracic or lumbosacral neuritis or radiculitis, unspecified    Enthesopathy of hip region    Pulmonary disorder    COVID-19    Acute respiratory disease due to COVID-19 virus         PLAN:    COVID-19 pneumonia    Continue with dexamethasone remdesivir O2 coagulation

## 2021-08-19 NOTE — PROGRESS NOTES
Uneventful night, VSS, pt denies pain, no signs of distress, no SOB, resting comfortably.  Pt continent and ambulated to toilet with standby assist.

## 2021-08-19 NOTE — PROGRESS NOTES
Spoke to patient daughter, Otoniel Carlisle, over the phone. Updated on her current clinical status. All questions answered.

## 2021-08-19 NOTE — PROGRESS NOTES
--  4.1   CL 89*  --   --  94*   CO2 30  --   --  27   BUN 47*  --   --  41*   CREATININE 0.67   < > 0.9 0.67   GLUCOSE 224*  --   --  328*    < > = values in this interval not displayed.      Recent Labs     08/18/21  1345 08/19/21  0509   AST 25 19   ALT 30 25   BILITOT 0.7 0.6   ALKPHOS 94 80       Current Facility-Administered Medications   Medication Dose Route Frequency Provider Last Rate Last Admin    sodium chloride flush 0.9 % injection 5-40 mL  5-40 mL Intravenous 2 times per day Babak Vivi, DO   10 mL at 08/19/21 0857    sodium chloride flush 0.9 % injection 5-40 mL  5-40 mL Intravenous PRN Babak Vivi, DO   10 mL at 08/18/21 1932    0.9 % sodium chloride infusion  25 mL Intravenous PRN Babak Vivi, DO        ondansetron (ZOFRAN-ODT) disintegrating tablet 4 mg  4 mg Oral Q8H PRN Shoaib Hobe Soundronaldo Serrano, DO        Or    [facility]ron [facility] injection 4 mg  4 mg Intravenous Q6H PRN Shoaib [place] Zach, DO        polyethylene glycol (GLYCOLAX) packet 17 g  17 g Oral Daily PRN Babak Vivi, DO        acetaminophen (TYLENOL) tablet 650 mg  650 mg Oral Q6H PRN Shoaib [place] Zach, DO   650 mg at 08/18/21 1840    Or    acetaminophen (TYLENOL) suppository 650 mg  650 mg Rectal Q6H PRN Shoaib Albertsein, DO        dexamethasone (DECADRON) injection 6 mg  6 mg Intravenous Q24H Babak Vivi, DO   6 mg at 08/18/21 1840    guaiFENesin-dextromethorphan (ROBITUSSIN DM) 100-10 MG/5ML syrup 5 mL  5 mL Oral Q4H PRN Babak Vivi, DO        Vitamin D (CHOLECALCIFEROL) tablet 2,000 Units  2,000 Units Oral Daily Babak Vivi, DO   2,000 Units at 08/19/21 0856    enoxaparin (LOVENOX) injection 30 mg  30 mg Subcutaneous BID Babak Vivi, DO   30 mg at 08/19/21 0856    famotidine (PEPCID) injection 20 mg  20 mg Intravenous Daily Babak Vivi, DO   20 mg at 08/19/21 0856    aspirin EC tablet 81 mg  81 mg Oral BID Babak Trinidad DO   81 mg at 08/19/21 0856    metoprolol succinate (TOPROL XL) extended release tablet 50 mg  50 mg Oral Daily Consuelo Jin DO   50 mg at 08/19/21 0856    pravastatin (PRAVACHOL) tablet 40 mg  40 mg Oral Nightly Consuelo Jin DO   40 mg at 08/18/21 2018    albuterol sulfate  (90 Base) MCG/ACT inhaler 2 puff  2 puff Inhalation Q6H PRN Consuelo Jin DO        remdesivir 100 mg in sodium chloride 0.9 % 250 mL IVPB  100 mg Intravenous Q24H Consuelo Jin DO           Additional work up or/and treatment plan may be added today or then after based on clinical progression. I am managing a portion of pt care. Some medical issues are handled by other specialists. Additional work up and treatment should be done in out pt setting by pt PCP and other out pt providers. In addition to examining and evaluating pt, I spent additional time explaining care, normaland abnormal findings, and treatment plan. All of pt questions were answered. Counseling, diet and education were provided. Case will be discussed with nursing staff when appropriate. Family will be updated if and when appropriate.        Electronically signed by Consuelo Jin DO on 8/19/2021 at 2:06 PM

## 2021-08-20 LAB
ALBUMIN SERPL-MCNC: 3.4 G/DL (ref 3.5–4.6)
ALP BLD-CCNC: 86 U/L (ref 40–130)
ALT SERPL-CCNC: 23 U/L (ref 0–33)
ANION GAP SERPL CALCULATED.3IONS-SCNC: 12 MEQ/L (ref 9–15)
AST SERPL-CCNC: 18 U/L (ref 0–35)
BASOPHILS ABSOLUTE: 0 K/UL (ref 0–0.2)
BASOPHILS RELATIVE PERCENT: 0.1 %
BILIRUB SERPL-MCNC: 0.6 MG/DL (ref 0.2–0.7)
BUN BLDV-MCNC: 39 MG/DL (ref 8–23)
CALCIUM SERPL-MCNC: 9.6 MG/DL (ref 8.5–9.9)
CHLORIDE BLD-SCNC: 97 MEQ/L (ref 95–107)
CO2: 30 MEQ/L (ref 20–31)
CREAT SERPL-MCNC: 0.65 MG/DL (ref 0.5–0.9)
EOSINOPHILS ABSOLUTE: 0 K/UL (ref 0–0.7)
EOSINOPHILS RELATIVE PERCENT: 0 %
GFR AFRICAN AMERICAN: >60
GFR NON-AFRICAN AMERICAN: >60
GLOBULIN: 3 G/DL (ref 2.3–3.5)
GLUCOSE BLD-MCNC: 254 MG/DL (ref 70–99)
GLUCOSE BLD-MCNC: 295 MG/DL (ref 60–115)
GLUCOSE BLD-MCNC: 406 MG/DL (ref 60–115)
HCT VFR BLD CALC: 39.4 % (ref 37–47)
HEMOGLOBIN: 13.6 G/DL (ref 12–16)
LYMPHOCYTES ABSOLUTE: 0.4 K/UL (ref 1–4.8)
LYMPHOCYTES RELATIVE PERCENT: 5.1 %
MCH RBC QN AUTO: 30.6 PG (ref 27–31.3)
MCHC RBC AUTO-ENTMCNC: 34.6 % (ref 33–37)
MCV RBC AUTO: 88.4 FL (ref 82–100)
MONOCYTES ABSOLUTE: 0.3 K/UL (ref 0.2–0.8)
MONOCYTES RELATIVE PERCENT: 4.2 %
NEUTROPHILS ABSOLUTE: 7.5 K/UL (ref 1.4–6.5)
NEUTROPHILS RELATIVE PERCENT: 90.6 %
PDW BLD-RTO: 12.4 % (ref 11.5–14.5)
PERFORMED ON: ABNORMAL
PERFORMED ON: ABNORMAL
PLATELET # BLD: 445 K/UL (ref 130–400)
POTASSIUM REFLEX MAGNESIUM: 3.9 MEQ/L (ref 3.4–4.9)
PROCALCITONIN: 0.1 NG/ML (ref 0–0.15)
RBC # BLD: 4.46 M/UL (ref 4.2–5.4)
SODIUM BLD-SCNC: 139 MEQ/L (ref 135–144)
TOTAL PROTEIN: 6.4 G/DL (ref 6.3–8)
WBC # BLD: 8.3 K/UL (ref 4.8–10.8)

## 2021-08-20 PROCEDURE — 2060000000 HC ICU INTERMEDIATE R&B

## 2021-08-20 PROCEDURE — 2700000000 HC OXYGEN THERAPY PER DAY

## 2021-08-20 PROCEDURE — 94640 AIRWAY INHALATION TREATMENT: CPT

## 2021-08-20 PROCEDURE — 2500000003 HC RX 250 WO HCPCS: Performed by: INTERNAL MEDICINE

## 2021-08-20 PROCEDURE — 99232 SBSQ HOSP IP/OBS MODERATE 35: CPT | Performed by: INTERNAL MEDICINE

## 2021-08-20 PROCEDURE — 80053 COMPREHEN METABOLIC PANEL: CPT

## 2021-08-20 PROCEDURE — 84145 PROCALCITONIN (PCT): CPT

## 2021-08-20 PROCEDURE — 2580000003 HC RX 258: Performed by: INTERNAL MEDICINE

## 2021-08-20 PROCEDURE — 94761 N-INVAS EAR/PLS OXIMETRY MLT: CPT

## 2021-08-20 PROCEDURE — 36415 COLL VENOUS BLD VENIPUNCTURE: CPT

## 2021-08-20 PROCEDURE — 6360000002 HC RX W HCPCS: Performed by: INTERNAL MEDICINE

## 2021-08-20 PROCEDURE — 6370000000 HC RX 637 (ALT 250 FOR IP): Performed by: INTERNAL MEDICINE

## 2021-08-20 PROCEDURE — 85025 COMPLETE CBC W/AUTO DIFF WBC: CPT

## 2021-08-20 RX ADMIN — METOPROLOL SUCCINATE 50 MG: 50 TABLET, FILM COATED, EXTENDED RELEASE ORAL at 09:00

## 2021-08-20 RX ADMIN — FAMOTIDINE 20 MG: 10 INJECTION INTRAVENOUS at 09:00

## 2021-08-20 RX ADMIN — ENOXAPARIN SODIUM 30 MG: 30 INJECTION SUBCUTANEOUS at 09:00

## 2021-08-20 RX ADMIN — Medication 10 ML: at 09:00

## 2021-08-20 RX ADMIN — PRAVASTATIN SODIUM 40 MG: 40 TABLET ORAL at 20:45

## 2021-08-20 RX ADMIN — ALBUTEROL SULFATE 2 PUFF: 90 AEROSOL, METERED RESPIRATORY (INHALATION) at 08:41

## 2021-08-20 RX ADMIN — REMDESIVIR 100 MG: 100 INJECTION, POWDER, LYOPHILIZED, FOR SOLUTION INTRAVENOUS at 20:45

## 2021-08-20 RX ADMIN — Medication 10 ML: at 20:45

## 2021-08-20 RX ADMIN — DEXAMETHASONE SODIUM PHOSPHATE 6 MG: 4 INJECTION, SOLUTION INTRAMUSCULAR; INTRAVENOUS at 12:54

## 2021-08-20 RX ADMIN — ALBUTEROL SULFATE 2 PUFF: 90 AEROSOL, METERED RESPIRATORY (INHALATION) at 14:55

## 2021-08-20 RX ADMIN — ASPIRIN 81 MG: 81 TABLET, COATED ORAL at 09:00

## 2021-08-20 RX ADMIN — ENOXAPARIN SODIUM 30 MG: 30 INJECTION SUBCUTANEOUS at 20:45

## 2021-08-20 RX ADMIN — ALBUTEROL SULFATE 2 PUFF: 90 AEROSOL, METERED RESPIRATORY (INHALATION) at 21:05

## 2021-08-20 RX ADMIN — Medication 2000 UNITS: at 09:00

## 2021-08-20 RX ADMIN — ASPIRIN 81 MG: 81 TABLET, COATED ORAL at 20:45

## 2021-08-20 ASSESSMENT — ENCOUNTER SYMPTOMS
COUGH: 1
GASTROINTESTINAL NEGATIVE: 1
SHORTNESS OF BREATH: 1

## 2021-08-20 ASSESSMENT — PAIN SCALES - GENERAL
PAINLEVEL_OUTOF10: 0
PAINLEVEL_OUTOF10: 0

## 2021-08-20 NOTE — PROGRESS NOTES
Physician Progress Note    2021   4:36 PM    Name:  Paul Guallpa  MRN:    [id number]      Day: 2     Admit Date: 2021  1:04 PM  PCP: Valery Goodman MD    Code Status:  Full Code      Assessment and Plan: Active Problems/ diagnosis:     Acute hypoxic respiratory failure due to bilateral COVID-19 pneumonia  COVID-19 pneumonia  Hypertension  Hyperlipidemia  Pulmonary hypertension     Plan  1. Patient continues to be on 50% FiO2/high flow. 2. Pulmonary since following  3. Resume on remdesivir  4. Appreciate infectious's input. 5. Resume Decadron 6 mg daily  6. Inhaled treatments  7. Resume home medications  8. Lovenox 30 mg twice daily  9. Full code  10. Continue contact and droplet isolation per Covid protocol  11. Regular diet       Subjective:     Dyspnea is improving. She subjectively overall feeling better. She continues to require high flow nasal cannula. Physical Examination:      Vitals:  /64   Pulse 80   Temp 98.1 °F (36.7 °C) (Oral)   Resp 18   Ht 5' 3\" (1.6 m)   Wt 135 lb (61.2 kg)   SpO2 95%   BMI 23.91 kg/m²   Temp (24hrs), Av.8 °F (37.1 °C), Min:98.1 °F (36.7 °C), Max:99.5 °F (37.5 °C)      General appearance: alert, cooperative and no distress  Mental Status: oriented to person, place and time and normal affect  Lungs: Diminished bilaterally.   Heart: regular rate and rhythm, no murmur  Abdomen: soft, nontender, nondistended, bowel sounds present, no masses  Extremities: no edema, redness, tenderness in the calves  Skin: no gross lesions, rashes    Data:     Labs:  Recent Labs     21  0509 21  0600   WBC 4.8 8.3   HGB 13.1 13.6    445*     Recent Labs     21  0509 21  0600    139   K 4.1 3.9   CL 94* 97   CO2 27 30   BUN 41* 39*   CREATININE 0.67 0.65   GLUCOSE 328* 254*     Recent Labs     21  0509 21  0600   AST 19 18   ALT 25 23   BILITOT 0.6 0.6   ALKPHOS 80 86       Current Facility-Administered Medications   Medication Dose Route Frequency Provider Last Rate Last Admin    albuterol sulfate  (90 Base) MCG/ACT inhaler 2 puff  2 puff Inhalation TID Brigido Duke MD   2 puff at 08/20/21 1455    albuterol sulfate  (90 Base) MCG/ACT inhaler 2 puff  2 puff Inhalation Q2H PRN Brigido Duke MD        sodium chloride flush 0.9 % injection 5-40 mL  5-40 mL Intravenous 2 times per day Consuelo Turkmen, DO   10 mL at 08/20/21 0900    sodium chloride flush 0.9 % injection 5-40 mL  5-40 mL Intravenous PRN Consuelo Turkmen, DO   10 mL at 08/18/21 1932    0.9 % sodium chloride infusion  25 mL Intravenous PRN Consuelo Turkmen, DO        ondansetron (ZOFRAN-ODT) disintegrating tablet 4 mg  4 mg Oral Q8H PRN [facility] Zach, DO        Or    ondansetron [facility]) injection 4 mg  4 mg Intravenous Q6H [facility]        polyethylene glycol (GLYCOLAX) packet 17 g  17 g Oral Daily PRN Consuelo Turkmen, DO        acetaminophen (TYLENOL) tablet 650 mg  650 mg Oral Q6H [facility]   650 mg at 08/19/21 1414    Or    acetaminophen (TYLENOL) suppository 650 mg  650 mg Rectal Q6H PRN [facility]        dexamethasone (DECADRON) injection 6 mg  6 mg Intravenous Q24H Consuelo Turkmen, DO   6 mg at 08/20/21 1254    guaiFENesin-dextromethorphan (ROBITUSSIN DM) 100-10 MG/5ML syrup 5 mL  5 mL Oral Q4H PRN Consuelo Turkmen, DO        Vitamin D (CHOLECALCIFEROL) tablet 2,000 Units  2,000 Units Oral Daily Consuelo Turkmen, DO   2,000 Units at 08/20/21 0900    enoxaparin (LOVENOX) injection 30 mg  30 mg Subcutaneous BID Consuelo Turkmen, DO   30 mg at 08/20/21 0900    famotidine (PEPCID) injection 20 mg  20 mg Intravenous Daily Consuelo Turkmen, DO   20 mg at 08/20/21 0900    aspirin EC tablet 81 mg  81 mg Oral BID Consuelo Turkmen, DO   81 mg at 08/20/21 0900    metoprolol succinate (TOPROL XL) extended release tablet 50 mg  50 mg Oral Daily Consuelo Turkmen, DO   50 mg at 08/20/21 0900    pravastatin (PRAVACHOL) tablet 40 mg  40 mg Oral Nightly Pato Du DO   40 mg at 08/19/21 2041    remdesivir 100 mg in sodium chloride 0.9 % 250 mL IVPB  100 mg Intravenous Q24H Pato Du DO   Stopped at 08/19/21 2112       Additional work up or/and treatment plan may be added today or then after based on clinical progression. I am managing a portion of pt care. Some medical issues are handled by other specialists. Additional work up and treatment should be done in out pt setting by pt PCP and other out pt providers. In addition to examining and evaluating pt, I spent additional time explaining care, normaland abnormal findings, and treatment plan. All of pt questions were answered. Counseling, diet and education were provided. Case will be discussed with nursing staff when appropriate. Family will be updated if and when appropriate.        Electronically signed by Pato Du DO on 8/20/2021 at 4:36 PM

## 2021-08-20 NOTE — PROGRESS NOTES
INPATIENT PROGRESS NOTES    PATIENT NAME: Juanita Boone  MRN: [id number]  SERVICE DATE:  2021   SERVICE TIME:  12:37 PM      PRIMARY SERVICE: Pulmonary Disease    CHIEF COMPLAINTS: COVID-19 pneumonia    INTERVAL HPI: Patient seen and examined at bedside, Interval Notes, orders reviewed. Nursing notes noted    Patient report feels better today, mild shortness of breath, no issues overnight, she is on 55% FiO2 50 L saturation 91 to 96%, no nausea no vomiting, T-max 37.5. Review of system:     GI Abdominal pain No  Skin Rash No    Social History     Tobacco Use    Smoking status: Former Smoker     Quit date: 1986     Years since quittin.6    Smokeless tobacco: Never Used   Substance Use Topics    Alcohol use: No         Problem Relation Age of Onset    Heart Disease Mother     Heart Disease Father     Cancer Other         breast         OBJECTIVE    Body mass index is 23.91 kg/m². PHYSICAL EXAM:  Vitals:  /64   Pulse 80   Temp 98.1 °F (36.7 °C) (Oral)   Resp 18   Ht 5' 3\" (1.6 m)   Wt 135 lb (61.2 kg)   SpO2 91%   BMI 23.91 kg/m²     General: alert, cooperative, no distress  Head: normocephalic, atraumatic  Eyes:No gross abnormalities. ENT:  MMM no lesions  Neck:  supple and no masses  Chest : Few rales bilaterally, no wheezing, nontender, tympanic  Heart[de-identified] Heart sounds are normal.  Regular rate and rhythm without murmur, gallop or rub. ABD:  symmetric, soft, non-tender, no guarding or rebound  Musculoskeletal : no cyanosis, no clubbing and no edema  Neuro:  Grossly normal  Skin: No rashes or nodules noted.   Lymph node:  no cervical nodes  Urology: No [facility]ana   Psychiatric: appropriate    DATA:   Recent Labs     21  0509 21  0600   WBC 4.8 8.3   HGB 13.1 13.6   HCT 38.2 39.4   MCV 88.2 88.4    445*     Recent Labs     21  0509 21  0509 21  0600     --  139   K 4.1  --  3.9   CL 94*  --  97   CO2 27  --  30   BUN 41*  --  39* CREATININE 0.67  --  0.65   GLUCOSE 328*  --  254*   CALCIUM 9.7  --  9.6   PROT 6.1*  --  6.4   LABALBU 3.2*  --  3.4*   BILITOT 0.6  --  0.6   ALKPHOS 80  --  86   AST 19  --  18   ALT 25   < > 23   LABGLOM >60.0   < > >60.0   GFRAA >60.0   < > >60.0   GLOB 2.9   < > 3.0    < > = values in this interval not displayed. MV Settings:     FiO2 : 55 %    Recent Labs     08/18/21  1459   PHART 7.443   MTE7VPZ 44   PO2ART 88*   PYM1NKO 30.0*   BEART 6*   J0LIZFRA 97*       O2 Device: Heated high flow cannula  O2 Flow Rate (L/min): 50 L/min    ADULT DIET; Regular; Safety Tray; Safety Tray (Disposables)  Adult Oral Nutrition Supplement; Standard High Calorie/High Protein Oral Supplement     MEDICATIONS during current hospitalization:    Continuous Infusions:   sodium chloride         Scheduled Meds:   albuterol sulfate HFA  2 puff Inhalation TID    sodium chloride flush  5-40 mL Intravenous 2 times per day    dexamethasone  6 mg Intravenous Q24H    Vitamin D  2,000 Units Oral Daily    enoxaparin  30 mg Subcutaneous BID    famotidine (PEPCID) injection  20 mg Intravenous Daily    aspirin  81 mg Oral BID    metoprolol succinate  50 mg Oral Daily    pravastatin  40 mg Oral Nightly    remdesivir IVPB  100 mg Intravenous Q24H       PRN Meds:albuterol sulfate HFA, sodium chloride flush, sodium chloride, ondansetron **OR** ondansetron, polyethylene glycol, acetaminophen **OR** acetaminophen, guaiFENesin-dextromethorphan    Radiology  CTA Chest W WO  (PE study)    Result Date: 8/18/2021  The EXAMINATION: CT scan of the chest with contrast (pulmonary embolism protocol) INDICATION: Chest pain and shortness of breath. COMPARISON: None TECHNIQUE: Helical CT was performed through the chest utilizing 100 cc of Isovue-300 intravenous contrast.  Images were obtained with bolus tracking in order to opacify the pulmonary arteries. Both MIP and 3D volume rendered reconstructions were performed.  FINDINGS: There are no workstation in the coronal plane with thick slab technique utilized in the interpretation. 3-D maximum intensity projection performed. All CT scans at this facility use dose modulation, iterative reconstruction, and/or weight based dosing when appropriate to reduce radiation dose to as low as reasonably achievable. CHEST CTA  FINDINGS: There is an 11 mm inhomogeneous nodule in the left lobe of the thyroid gland. Mediastinum: There are mild hilar and mediastinal lymph nodes, likely related to reactive nodes. The chest wall and lower neck are normal Heart: The heart is enlarged. There is no pericardial effusion. Vascular structures: There is no evidence for thoracic aortic aneurysm or dissection. No evidence of traumatic aortic injury. There are no persistent filling defects within the pulmonary arteries. Lungs: There are patchy groundglass alveolar alveolar opacities in both lungs worrisome for early infiltrates possible viral pneumonia, COVID-19. Pleura: No pleural effusion or thickening. Upper abdomen: There is a 3.5 x 5.4 cm hiatal hernia containing the proximal stomach. The visualized portions of the upper abdomen are unremarkable. Bones/axillae/soft tissues: Osseous structures and chest wall are normal.     Negative CTA of the chest. No evidence of thoracic aortic dissection or aneurysm. The study is negative for pulmonary emboli. There are patchy bilateral alveolar opacities worrisome for early infiltrates possible viral pneumonia. Differential diagnosis includes COVID-19. XR CHEST PORTABLE    Result Date: 8/18/2021  EXAMINATION: Portable AP ERECT view of the chest. CLINICAL HISTORY:  sob, hypoxia , Positive Covid-19 test. DATE: 8/18/2021 2:01 PM COMPARISONS: 8/13/2021 FINDINGS: The heart is mildly enlarged, unchanged. There are atherosclerotic calcifications in the aortic arch. There are peripheral groundglass infiltrates in the mid lungs in lung bases.  Infiltrates mildly increased since prior exam. Findings may be secondary to viral pneumonia. Patient also be secondary to fluid overload or congestive heart failure. No pleural effusion or pneumothorax. The bony thorax is unremarkable. PERIPHERAL GROUNDGLASS INFILTRATES IN BOTH LUNGS, PRIMARILY IN THE MIDLUNGS AND LUNG BASES. MILD INCREASING INFILTRATES. DIFFERENTIAL DIAGNOSIS INCLUDES FLUID OVERLOAD, CHF OR VIRAL PNEUMONIA. XR CHEST PORTABLE    Result Date: 8/13/2021  Exam: XR CHEST PORTABLE History:  cough Technique: AP portable view of the chest obtained. Comparison: Chest x-ray from March 13, 2018 Chest x-ray portable Findings: The cardiac silhouette is enlarged. There are mild increased interstitial markings of both lungs which may reflect fluid overload versus congestive heart failure. There are no pleural effusions. . Bones of the thorax appear intact. Increased pulmonary markings indicate fluid overload, CHF, or viral pneumonia.              IMPRESSION AND SUGGESTION:  Patient is at risk due to   · Acute hypoxic respiratory failure  · Severe COVID-19 pneumonia  · History of pulmonary hypertension  · History of nocturnal hypoxemia      Recommendation  · Continue Decadron 6 mg daily for 10 days  · Remdesivir for 5 days  · Maintain euvolemic avoid overload  · O2 to keep sat 93 to 95%  · Encourage prone position         Electronically signed by Anuel Burnett MD,  FCCP   on 8/20/2021 at 12:37 PM

## 2021-08-20 NOTE — CARE COORDINATION
Pneumonia booklet and zones sheet, [employer] \"COVID-19 Discharge Instructions\" and \"Recovery After COVID-19\" left outside room as patient is in isolation. Requested that nurse take them in to the patient the next time the nurse goes into the room.

## 2021-08-20 NOTE — PROGRESS NOTES
Infectious Disease     Patient Name: Paul Guallpa  Date: 8/20/2021  YOB: 1932  Medical Record Number: [id number]      COVID-19 pneumonia        History of Present Illness:  Macular degeneration    Tested positive for COVID-19 13 2021  Return to the emergency room with worsening shortness of breath increasing dyspnea on exertion room air saturation 75% on presentation x-ray showing increasing infiltrates  After her ER visit she was to receive monoclonal antibody as an outpatient  Patient is not vaccinated        The EXAMINATION: CT scan of the chest with contrast (pulmonary embolism protocol)       INDICATION: Chest pain and shortness of breath.       COMPARISON: None       TECHNIQUE: Helical CT was performed through the chest utilizing 100 cc of Isovue-300 intravenous contrast.  Images were obtained with bolus tracking in order to opacify the pulmonary arteries.  Both MIP and 3D volume rendered reconstructions were    performed.       FINDINGS: There are no findings central, proximal proximal-segmental pulmonary emboli.       There is mild to moderate emphysematous disease. There is areas of patchy multifocal to coalescent airspace disease throughout the lung parenchyma which has shown interval progression, worsening as compared to the prior examination. No pleural effusions. No pneumothoraces.       There is nonspecific subcentimeter periaortic adenopathy. There is pretracheal adenopathy. There is bilateral perihilar adenopathy. No significant subcarinal adenopathy.       In the field-of-view the abdomen is a moderate to large hiatal hernia.       There is multilevel degenerative changes with bridging osteophytes of the thoracic spine superpose upon a mild dorsal kyphosis.       Nodule left lobe of thyroid measuring approximately 9 mm.               Impression   1. NO FINDINGS CENTRAL, PROXIMAL OR PROXIMAL- SEGMENTAL PULMONARY EMBOLI. .     2.  THERE IS MILD EMPHYSEMATOUS DISEASE AND CHRONIC INTERSTITIAL CHANGES WITH WORSENING AREAS OF PATCHY MULTIFOCAL to COALESCENT AIRSPACE DISEASE WITHIN THE LUNG PARENCHYMA. COMMONLY REPORT IMAGING FEATURES OF (COVID-19) PNEUMONIA ARE PRESENT. OTHER    PROCESSES SUCH AS INFLUENZA, PNEUMONIA AND ORGANIZING PNEUMONIA AS CAN BE SEEN WITH DRUG TOXICITY AND CONNECTIVE TISSUE DISEASE CAN CAUSE A SIMILAR IMAGING PATTERN. 3. MODERATE TO LARGE HIATAL HERNIA. 4. NODULE LEFT LOBE OF THYROID. CORRELATE CLINICALLY FOLLOW-UP       Other findings detailed above       EXAMINATION: Portable AP ERECT view of the chest.       CLINICAL HISTORY:  sob, hypoxia , Positive Covid-19 test.        DATE: 8/18/2021 2:01 PM       COMPARISONS: 8/13/2021       FINDINGS: The heart is mildly enlarged, unchanged. There are atherosclerotic calcifications in the aortic arch. There are peripheral groundglass infiltrates in the mid lungs in lung bases. Infiltrates mildly increased since prior exam. Findings may be    secondary to viral pneumonia. Patient also be secondary to fluid overload or congestive heart failure. No pleural effusion or pneumothorax.  The bony thorax is unremarkable.           Impression   PERIPHERAL GROUNDGLASS INFILTRATES IN BOTH LUNGS, PRIMARILY IN THE MIDLUNGS AND LUNG BASES. MILD INCREASING INFILTRATES. DIFFERENTIAL DIAGNOSIS INCLUDES FLUID OVERLOAD, CHF OR VIRAL PNEUMONIA. High flow O2        Review of Systems   Constitutional: Negative for chills, diaphoresis, fatigue and fever. Respiratory: Positive for cough and shortness of breath. Cardiovascular: Negative. Gastrointestinal: Negative. Physical Exam  Constitutional:       Appearance: She is ill-appearing. Cardiovascular:      Heart sounds: Normal heart sounds. No murmur heard. Pulmonary:      Effort: Pulmonary effort is normal. No respiratory distress. Breath sounds: Normal breath sounds. No stridor. No wheezing, rhonchi or rales. Abdominal:      General: Abdomen is flat.  Bowel sounds

## 2021-08-20 NOTE — PROGRESS NOTES
2050 assessment as stated, denies pain, alert oriented, remains on high flow oxygen, with diminished lung sounds, SOB with exertion. Spoke with dtr, Marina Prado as she was concerned that her mother wasn't \"acting\" like herself and was possibly becoming confused, I informed her from my assessment her mother appears axox4 and was able to follow commands. No acute distress noted.  Electronically signed by Brianna Lobato RN on 8/20/2021 at 2:28 AM

## 2021-08-20 NOTE — PROGRESS NOTES
Shift assessment completed. Pt is alert and oriented x4, able to make needs known. LS diminished, SOB with exertion, no resp distress noted. Pt remains on high flow o2, resp mgmt. Attempt to decrease O2 flow failed this AM as pt sats began to drop. Pt remains SOB with decrease in O2 or increased activity. HR and rhythm normal. Edema present, BLE non pitting. Skin is pale cool and dry. Appetite is slowly improving per pt, needs s/u assistance with meals d/t macular degeneration. Continuing to encourage PO fluids. Pt is complaint with lying prone position and maintains O2 throughout shift. Abd is soft and non distended, last BM 8/19/21. Urine output is adequate, malodorous and slight taty color. Pt denies pain at this time. Pt hair washed and bathed. Updates given to dtr Makayla this AM. Call light within reach.      Continues with non productive moist cough

## 2021-08-21 ENCOUNTER — APPOINTMENT (OUTPATIENT)
Dept: GENERAL RADIOLOGY | Age: 86
DRG: 177 | End: 2021-08-21
Payer: MEDICARE

## 2021-08-21 LAB
ALBUMIN SERPL-MCNC: 3.2 G/DL (ref 3.5–4.6)
ALP BLD-CCNC: 88 U/L (ref 40–130)
ALT SERPL-CCNC: 23 U/L (ref 0–33)
ANION GAP SERPL CALCULATED.3IONS-SCNC: 12 MEQ/L (ref 9–15)
AST SERPL-CCNC: 19 U/L (ref 0–35)
BASE EXCESS ARTERIAL: 10 (ref -3–3)
BASE EXCESS ARTERIAL: 8 (ref -3–3)
BASOPHILS ABSOLUTE: 0 K/UL (ref 0–0.2)
BASOPHILS RELATIVE PERCENT: 0.3 %
BILIRUB SERPL-MCNC: 0.7 MG/DL (ref 0.2–0.7)
BUN BLDV-MCNC: 43 MG/DL (ref 8–23)
CALCIUM IONIZED: 1.2 MMOL/L (ref 1.12–1.32)
CALCIUM IONIZED: 1.31 MMOL/L (ref 1.12–1.32)
CALCIUM SERPL-MCNC: 9.9 MG/DL (ref 8.5–9.9)
CHLORIDE BLD-SCNC: 97 MEQ/L (ref 95–107)
CO2: 29 MEQ/L (ref 20–31)
CREAT SERPL-MCNC: 0.55 MG/DL (ref 0.5–0.9)
EOSINOPHILS ABSOLUTE: 0 K/UL (ref 0–0.7)
EOSINOPHILS RELATIVE PERCENT: 0.3 %
GFR AFRICAN AMERICAN: >60
GFR NON-AFRICAN AMERICAN: >60
GLOBULIN: 3 G/DL (ref 2.3–3.5)
GLUCOSE BLD-MCNC: 103 MG/DL (ref 60–115)
GLUCOSE BLD-MCNC: 176 MG/DL (ref 60–115)
GLUCOSE BLD-MCNC: 181 MG/DL (ref 60–115)
GLUCOSE BLD-MCNC: 186 MG/DL (ref 60–115)
GLUCOSE BLD-MCNC: 194 MG/DL (ref 70–99)
GLUCOSE BLD-MCNC: 308 MG/DL (ref 60–115)
GLUCOSE BLD-MCNC: 90 MG/DL (ref 60–115)
HCO3 ARTERIAL: 30.3 MMOL/L (ref 21–29)
HCO3 ARTERIAL: 32.4 MMOL/L (ref 21–29)
HCT VFR BLD CALC: 39.4 % (ref 37–47)
HEMOGLOBIN: 13.6 GM/DL (ref 12–16)
HEMOGLOBIN: 13.7 G/DL (ref 12–16)
HEMOGLOBIN: 15.2 GM/DL (ref 12–16)
LACTATE: 1.96 MMOL/L (ref 0.4–2)
LACTATE: 2.13 MMOL/L (ref 0.4–2)
LYMPHOCYTES ABSOLUTE: 0.5 K/UL (ref 1–4.8)
LYMPHOCYTES RELATIVE PERCENT: 5.5 %
MAGNESIUM: 1.4 MG/DL (ref 1.7–2.4)
MCH RBC QN AUTO: 30.7 PG (ref 27–31.3)
MCHC RBC AUTO-ENTMCNC: 34.8 % (ref 33–37)
MCV RBC AUTO: 88.2 FL (ref 82–100)
MONOCYTES ABSOLUTE: 0.4 K/UL (ref 0.2–0.8)
MONOCYTES RELATIVE PERCENT: 4.2 %
NEUTROPHILS ABSOLUTE: 8.6 K/UL (ref 1.4–6.5)
NEUTROPHILS RELATIVE PERCENT: 89.7 %
O2 SAT, ARTERIAL: 93 % (ref 93–100)
O2 SAT, ARTERIAL: 93 % (ref 93–100)
PCO2 ARTERIAL: 36 MM HG (ref 35–45)
PCO2 ARTERIAL: 39 MM HG (ref 35–45)
PDW BLD-RTO: 12.3 % (ref 11.5–14.5)
PERFORMED ON: ABNORMAL
PERFORMED ON: NORMAL
PH ARTERIAL: 7.53 (ref 7.35–7.45)
PH ARTERIAL: 7.53 (ref 7.35–7.45)
PLATELET # BLD: 472 K/UL (ref 130–400)
PO2 ARTERIAL: 59 MM HG (ref 75–108)
PO2 ARTERIAL: 60 MM HG (ref 75–108)
POC CHLORIDE: 97 MEQ/L (ref 99–110)
POC CHLORIDE: 98 MEQ/L (ref 99–110)
POC CREATININE: 0.7 MG/DL (ref 0.6–1.2)
POC CREATININE: 0.8 MG/DL (ref 0.6–1.2)
POC FIO2: 55
POC FIO2: 60
POC HEMATOCRIT: 40 % (ref 36–48)
POC HEMATOCRIT: 45 % (ref 36–48)
POC POTASSIUM: 3 MEQ/L (ref 3.5–5.1)
POC POTASSIUM: 3.5 MEQ/L (ref 3.5–5.1)
POC SAMPLE TYPE: ABNORMAL
POC SAMPLE TYPE: ABNORMAL
POC SODIUM: 137 MEQ/L (ref 136–145)
POC SODIUM: 139 MEQ/L (ref 136–145)
POTASSIUM REFLEX MAGNESIUM: 3.4 MEQ/L (ref 3.4–4.9)
RBC # BLD: 4.46 M/UL (ref 4.2–5.4)
SODIUM BLD-SCNC: 138 MEQ/L (ref 135–144)
TCO2 ARTERIAL: 31 (ref 22–29)
TCO2 ARTERIAL: 34 (ref 22–29)
TOTAL PROTEIN: 6.2 G/DL (ref 6.3–8)
WBC # BLD: 9.6 K/UL (ref 4.8–10.8)

## 2021-08-21 PROCEDURE — 6360000002 HC RX W HCPCS: Performed by: INTERNAL MEDICINE

## 2021-08-21 PROCEDURE — 83735 ASSAY OF MAGNESIUM: CPT

## 2021-08-21 PROCEDURE — 2500000003 HC RX 250 WO HCPCS: Performed by: INTERNAL MEDICINE

## 2021-08-21 PROCEDURE — 6370000000 HC RX 637 (ALT 250 FOR IP): Performed by: INTERNAL MEDICINE

## 2021-08-21 PROCEDURE — 94640 AIRWAY INHALATION TREATMENT: CPT

## 2021-08-21 PROCEDURE — 83605 ASSAY OF LACTIC ACID: CPT

## 2021-08-21 PROCEDURE — 82803 BLOOD GASES ANY COMBINATION: CPT

## 2021-08-21 PROCEDURE — 71045 X-RAY EXAM CHEST 1 VIEW: CPT

## 2021-08-21 PROCEDURE — 2060000000 HC ICU INTERMEDIATE R&B

## 2021-08-21 PROCEDURE — 84295 ASSAY OF SERUM SODIUM: CPT

## 2021-08-21 PROCEDURE — 84132 ASSAY OF SERUM POTASSIUM: CPT

## 2021-08-21 PROCEDURE — 82565 ASSAY OF CREATININE: CPT

## 2021-08-21 PROCEDURE — 80053 COMPREHEN METABOLIC PANEL: CPT

## 2021-08-21 PROCEDURE — 82330 ASSAY OF CALCIUM: CPT

## 2021-08-21 PROCEDURE — 85014 HEMATOCRIT: CPT

## 2021-08-21 PROCEDURE — 94761 N-INVAS EAR/PLS OXIMETRY MLT: CPT

## 2021-08-21 PROCEDURE — 2700000000 HC OXYGEN THERAPY PER DAY

## 2021-08-21 PROCEDURE — 85025 COMPLETE CBC W/AUTO DIFF WBC: CPT

## 2021-08-21 PROCEDURE — 2580000003 HC RX 258: Performed by: INTERNAL MEDICINE

## 2021-08-21 PROCEDURE — 82435 ASSAY OF BLOOD CHLORIDE: CPT

## 2021-08-21 PROCEDURE — 82948 REAGENT STRIP/BLOOD GLUCOSE: CPT

## 2021-08-21 PROCEDURE — 36600 WITHDRAWAL OF ARTERIAL BLOOD: CPT

## 2021-08-21 PROCEDURE — 36415 COLL VENOUS BLD VENIPUNCTURE: CPT

## 2021-08-21 RX ORDER — DEXTROSE MONOHYDRATE 50 MG/ML
100 INJECTION, SOLUTION INTRAVENOUS PRN
Status: DISCONTINUED | OUTPATIENT
Start: 2021-08-21 | End: 2021-08-26 | Stop reason: HOSPADM

## 2021-08-21 RX ORDER — DEXTROSE MONOHYDRATE 25 G/50ML
12.5 INJECTION, SOLUTION INTRAVENOUS PRN
Status: DISCONTINUED | OUTPATIENT
Start: 2021-08-21 | End: 2021-08-26 | Stop reason: HOSPADM

## 2021-08-21 RX ORDER — POTASSIUM CHLORIDE 20 MEQ/1
20 TABLET, EXTENDED RELEASE ORAL ONCE
Status: COMPLETED | OUTPATIENT
Start: 2021-08-21 | End: 2021-08-21

## 2021-08-21 RX ORDER — MAGNESIUM SULFATE IN WATER 40 MG/ML
2000 INJECTION, SOLUTION INTRAVENOUS ONCE
Status: COMPLETED | OUTPATIENT
Start: 2021-08-21 | End: 2021-08-21

## 2021-08-21 RX ORDER — NICOTINE POLACRILEX 4 MG
15 LOZENGE BUCCAL PRN
Status: DISCONTINUED | OUTPATIENT
Start: 2021-08-21 | End: 2021-08-26 | Stop reason: HOSPADM

## 2021-08-21 RX ORDER — INSULIN GLARGINE 100 [IU]/ML
0.15 INJECTION, SOLUTION SUBCUTANEOUS NIGHTLY
Status: DISCONTINUED | OUTPATIENT
Start: 2021-08-21 | End: 2021-08-26 | Stop reason: HOSPADM

## 2021-08-21 RX ADMIN — MAGNESIUM SULFATE HEPTAHYDRATE 2000 MG: 40 INJECTION, SOLUTION INTRAVENOUS at 11:40

## 2021-08-21 RX ADMIN — ENOXAPARIN SODIUM 30 MG: 30 INJECTION SUBCUTANEOUS at 22:03

## 2021-08-21 RX ADMIN — PRAVASTATIN SODIUM 40 MG: 40 TABLET ORAL at 22:03

## 2021-08-21 RX ADMIN — ENOXAPARIN SODIUM 30 MG: 30 INJECTION SUBCUTANEOUS at 07:56

## 2021-08-21 RX ADMIN — INSULIN LISPRO 6 UNITS: 100 INJECTION, SOLUTION INTRAVENOUS; SUBCUTANEOUS at 18:06

## 2021-08-21 RX ADMIN — ASPIRIN 81 MG: 81 TABLET, COATED ORAL at 22:03

## 2021-08-21 RX ADMIN — FAMOTIDINE 20 MG: 10 INJECTION INTRAVENOUS at 07:56

## 2021-08-21 RX ADMIN — ALBUTEROL SULFATE 2 PUFF: 90 AEROSOL, METERED RESPIRATORY (INHALATION) at 14:12

## 2021-08-21 RX ADMIN — INSULIN LISPRO 6 UNITS: 100 INJECTION, SOLUTION INTRAVENOUS; SUBCUTANEOUS at 12:50

## 2021-08-21 RX ADMIN — METOPROLOL SUCCINATE 50 MG: 50 TABLET, FILM COATED, EXTENDED RELEASE ORAL at 07:57

## 2021-08-21 RX ADMIN — REMDESIVIR 100 MG: 100 INJECTION, POWDER, LYOPHILIZED, FOR SOLUTION INTRAVENOUS at 22:39

## 2021-08-21 RX ADMIN — POTASSIUM CHLORIDE 20 MEQ: 1500 TABLET, EXTENDED RELEASE ORAL at 18:05

## 2021-08-21 RX ADMIN — INSULIN LISPRO 4 UNITS: 100 INJECTION, SOLUTION INTRAVENOUS; SUBCUTANEOUS at 12:50

## 2021-08-21 RX ADMIN — Medication 10 ML: at 22:00

## 2021-08-21 RX ADMIN — Medication 2000 UNITS: at 07:57

## 2021-08-21 RX ADMIN — INSULIN GLARGINE 9 UNITS: 100 INJECTION, SOLUTION SUBCUTANEOUS at 22:03

## 2021-08-21 RX ADMIN — INSULIN LISPRO 1 UNITS: 100 INJECTION, SOLUTION INTRAVENOUS; SUBCUTANEOUS at 09:00

## 2021-08-21 RX ADMIN — ALBUTEROL SULFATE 2 PUFF: 90 AEROSOL, METERED RESPIRATORY (INHALATION) at 19:53

## 2021-08-21 RX ADMIN — Medication 10 ML: at 07:56

## 2021-08-21 RX ADMIN — ASPIRIN 81 MG: 81 TABLET, COATED ORAL at 07:57

## 2021-08-21 RX ADMIN — ALBUTEROL SULFATE 2 PUFF: 90 AEROSOL, METERED RESPIRATORY (INHALATION) at 08:35

## 2021-08-21 RX ADMIN — DEXAMETHASONE SODIUM PHOSPHATE 6 MG: 4 INJECTION, SOLUTION INTRAMUSCULAR; INTRAVENOUS at 19:37

## 2021-08-21 NOTE — FLOWSHEET NOTE
Went in to give decadron, patient had her high flow off. Sat 73%,high flow reapplied. Sat 91%. Resp notified and are in the room. Patient unable to say her name or birthday, moving all ext.

## 2021-08-21 NOTE — PROGRESS NOTES
Physician Progress Note    2021   2:04 PM    Name:  Malia Cassidy  MRN:    [id number]     IP Day: 3     Admit Date: 2021  1:04 PM  PCP: Lucia Guzman MD    Code Status:  Full Code      Assessment and Plan: Active Problems/ diagnosis:     Acute hypoxic respiratory failure due to bilateral COVID-19 pneumonia  COVID-19 pneumonia  Hypertension  Hyperlipidemia  Pulmonary hypertension     Plan  1. Today, electrolyte repleted, insulin added/adjusted, discussed with pulmonologist.  2. Patient continues to be on 50% FiO2/high flow. 3. Pulmonary since following  4. Resume on remdesivir  5. Appreciate infectious's input. 6. Resume Decadron 6 mg daily  7. Inhaled treatments  8. Resume home medications  9. Lovenox 30 mg twice daily  10. Full code  11. Continue contact and droplet isolation per Covid protocol  12. Regular diet       Subjective:     Dyspnea is improving. No change in symptomology. She continues to require high flow nasal cannula. Discussed with RN. Physical Examination:      Vitals:  BP (!) 141/58   Pulse 85   Temp 98.2 °F (36.8 °C) (Oral)   Resp 16   Ht 5' 3\" (1.6 m)   Wt 135 lb (61.2 kg)   SpO2 92%   BMI 23.91 kg/m²   Temp (24hrs), Av.4 °F (36.9 °C), Min:98.2 °F (36.8 °C), Max:98.8 °F (37.1 °C)      General appearance: alert, cooperative and no distress  Mental Status: oriented to person, place and time and normal affect  Lungs: Diminished bilaterally.   Heart: regular rate and rhythm, no murmur  Abdomen: soft, nontender, nondistended, bowel sounds present, no masses  Extremities: no edema, redness, tenderness in the calves  Skin: no gross lesions, rashes    Data:     Labs:  Recent Labs     21  0536   WBC 8.3 9.6   HGB 13.6 13.7   * 472*     Recent Labs     21  0536    138   K 3.9 3.4   CL 97 97   CO2 30 29   BUN 39* 43*   CREATININE 0.65 0.55   GLUCOSE 254* 194*     Recent Labs     21  0536 AST 18 19   ALT 23 23   BILITOT 0.6 0.7   ALKPHOS 86 88       Current Facility-Administered Medications   Medication Dose Route Frequency Provider Last Rate Last Admin    insulin glargine (LANTUS) injection vial 9 Units  0.15 Units/kg Subcutaneous Nightly Yazid R Zach, DO        insulin lispro (HUMALOG) injection vial 6 Units  0.1 Units/kg Subcutaneous TID  Yazid R Zach, DO        glucose (GLUTOSE) 40 % oral gel 15 g  15 g Oral PRN Pitney Khadijah, DO        dextrose 50 % IV solution  12.5 g Intravenous PRN Pitney Khadijah, DO        glucagon (rDNA) injection 1 mg  1 mg Intramuscular PRN Pitney Khadijah, DO        dextrose 5 % solution  100 mL/hr Intravenous PRN Pitney Khadijah, DO        potassium chloride (KLOR-CON M) extended release tablet 20 mEq  20 mEq Oral Once Pitney Khadijah, DO        insulin lispro (HUMALOG) injection vial 0-6 Units  0-6 Units Subcutaneous TID  Pitney Khadijah, DO   1 Units at 08/21/21 0900    insulin lispro (HUMALOG) injection vial 0-3 Units  0-3 Units Subcutaneous Nightly Pitney Khadijah, DO   3 Units at 08/20/21 2046    albuterol sulfate  (90 Base) MCG/ACT inhaler 2 puff  2 puff Inhalation TID Shaun Perry MD   2 puff at 08/21/21 0835    albuterol sulfate  (90 Base) MCG/ACT inhaler 2 puff  2 puff Inhalation Q2H PRN Jacey Cruz MD        sodium chloride flush 0.9 % injection 5-40 mL  5-40 mL Intravenous 2 times per day Pitney Khadijah, DO   10 mL at 08/21/21 0756    sodium chloride flush 0.9 % injection 5-40 mL  5-40 mL Intravenous PRN Pitney Khadijah, DO   10 mL at 08/18/21 1932    0.9 % sodium chloride infusion  25 mL Intravenous PRN Pitney Khadijah, DO        ondansetron (ZOFRAN-ODT) disintegrating tablet 4 mg  4 mg Oral Q8H PRN Forrestine Quill Zach, DO        Or    ondansetron [facility]) injection 4 mg  4 mg Intravenous Q6H PRN Forrestine Quill Zach, DO        polyethylene glycol (GLYCOLAX) packet 17 g  17 g Oral Daily PRN Osei Lucio,   acetaminophen (TYLENOL) tablet 650 mg  650 mg Oral Q6H PRN Elridge Saucedo, DO   650 mg at 08/19/21 1414    Or    acetaminophen (TYLENOL) suppository 650 mg  650 mg Rectal Q6H PRN Lyndon Stonein, DO        dexamethasone (DECADRON) injection 6 mg  6 mg Intravenous Q24H Elridge Saucedo, DO   6 mg at 08/20/21 1254    guaiFENesin-dextromethorphan (ROBITUSSIN DM) 100-10 MG/5ML syrup 5 mL  5 mL Oral Q4H PRN Elridge Saucedo, DO        Vitamin D (CHOLECALCIFEROL) tablet 2,000 Units  2,000 Units Oral Daily Elridge Saucedo, DO   2,000 Units at 08/21/21 0757    enoxaparin (LOVENOX) injection 30 mg  30 mg Subcutaneous BID Elridge Saucedo, DO   30 mg at 08/21/21 0756    famotidine (PEPCID) injection 20 mg  20 mg Intravenous Daily Elridge Saucedo, DO   20 mg at 08/21/21 0756    aspirin EC tablet 81 mg  81 mg Oral BID Elridge Saucedo, DO   81 mg at 08/21/21 0757    metoprolol succinate (TOPROL XL) extended release tablet 50 mg  50 mg Oral Daily Elridge Saucedo, DO   50 mg at 08/21/21 0757    pravastatin (PRAVACHOL) tablet 40 mg  40 mg Oral Nightly Elridge Saucedo, DO   40 mg at 08/20/21 2045    remdesivir 100 mg in sodium chloride 0.9 % 250 mL IVPB  100 mg Intravenous Q24H Elridge Saucedo, DO   Stopped at 08/20/21 2115       Additional work up or/and treatment plan may be added today or then after based on clinical progression. I am managing a portion of pt care. Some medical issues are handled by other specialists. Additional work up and treatment should be done in out pt setting by pt PCP and other out pt providers. In addition to examining and evaluating pt, I spent additional time explaining care, normaland abnormal findings, and treatment plan. All of pt questions were answered. Counseling, diet and education were provided. Case will be discussed with nursing staff when appropriate. Family will be updated if and when appropriate.        Electronically signed by Gabi Saucedo DO on 8/21/2021 at 2:04 PM

## 2021-08-22 LAB
ALBUMIN SERPL-MCNC: 3 G/DL (ref 3.5–4.6)
ALP BLD-CCNC: 80 U/L (ref 40–130)
ALT SERPL-CCNC: 22 U/L (ref 0–33)
ANION GAP SERPL CALCULATED.3IONS-SCNC: 13 MEQ/L (ref 9–15)
AST SERPL-CCNC: 21 U/L (ref 0–35)
BANDED NEUTROPHILS RELATIVE PERCENT: 1 % (ref 5–11)
BASOPHILS ABSOLUTE: 0.1 K/UL (ref 0–0.2)
BASOPHILS RELATIVE PERCENT: 1 %
BILIRUB SERPL-MCNC: 0.6 MG/DL (ref 0.2–0.7)
BUN BLDV-MCNC: 50 MG/DL (ref 8–23)
CALCIUM SERPL-MCNC: 9.4 MG/DL (ref 8.5–9.9)
CHLORIDE BLD-SCNC: 98 MEQ/L (ref 95–107)
CO2: 28 MEQ/L (ref 20–31)
CREAT SERPL-MCNC: 0.66 MG/DL (ref 0.5–0.9)
EOSINOPHILS ABSOLUTE: 0 K/UL (ref 0–0.7)
EOSINOPHILS RELATIVE PERCENT: 0 %
GFR AFRICAN AMERICAN: >60
GFR NON-AFRICAN AMERICAN: >60
GLOBULIN: 2.9 G/DL (ref 2.3–3.5)
GLUCOSE BLD-MCNC: 164 MG/DL (ref 60–115)
GLUCOSE BLD-MCNC: 220 MG/DL (ref 70–99)
GLUCOSE BLD-MCNC: 325 MG/DL (ref 60–115)
GLUCOSE BLD-MCNC: 379 MG/DL (ref 60–115)
HCT VFR BLD CALC: 37.5 % (ref 37–47)
HEMOGLOBIN: 13.4 G/DL (ref 12–16)
LYMPHOCYTES ABSOLUTE: 0.1 K/UL (ref 1–4.8)
LYMPHOCYTES RELATIVE PERCENT: 2 %
MCH RBC QN AUTO: 31.9 PG (ref 27–31.3)
MCHC RBC AUTO-ENTMCNC: 35.7 % (ref 33–37)
MCV RBC AUTO: 89.2 FL (ref 82–100)
MONOCYTES ABSOLUTE: 0.3 K/UL (ref 0.2–0.8)
MONOCYTES RELATIVE PERCENT: 3.8 %
MYELOCYTE PERCENT: 1 %
NEUTROPHILS ABSOLUTE: 6.7 K/UL (ref 1.4–6.5)
NEUTROPHILS RELATIVE PERCENT: 92 %
PDW BLD-RTO: 12.5 % (ref 11.5–14.5)
PERFORMED ON: ABNORMAL
PLATELET # BLD: 405 K/UL (ref 130–400)
PLATELET SLIDE REVIEW: ADEQUATE
POTASSIUM REFLEX MAGNESIUM: 3.7 MEQ/L (ref 3.4–4.9)
RBC # BLD: 4.21 M/UL (ref 4.2–5.4)
RBC # BLD: NORMAL 10*6/UL
SODIUM BLD-SCNC: 139 MEQ/L (ref 135–144)
TOTAL PROTEIN: 5.9 G/DL (ref 6.3–8)
WBC # BLD: 7.1 K/UL (ref 4.8–10.8)

## 2021-08-22 PROCEDURE — 2580000003 HC RX 258: Performed by: INTERNAL MEDICINE

## 2021-08-22 PROCEDURE — 99232 SBSQ HOSP IP/OBS MODERATE 35: CPT | Performed by: INTERNAL MEDICINE

## 2021-08-22 PROCEDURE — 6370000000 HC RX 637 (ALT 250 FOR IP): Performed by: INTERNAL MEDICINE

## 2021-08-22 PROCEDURE — 94761 N-INVAS EAR/PLS OXIMETRY MLT: CPT

## 2021-08-22 PROCEDURE — 36415 COLL VENOUS BLD VENIPUNCTURE: CPT

## 2021-08-22 PROCEDURE — 6360000002 HC RX W HCPCS: Performed by: INTERNAL MEDICINE

## 2021-08-22 PROCEDURE — 94664 DEMO&/EVAL PT USE INHALER: CPT

## 2021-08-22 PROCEDURE — 2060000000 HC ICU INTERMEDIATE R&B

## 2021-08-22 PROCEDURE — 94640 AIRWAY INHALATION TREATMENT: CPT

## 2021-08-22 PROCEDURE — 85025 COMPLETE CBC W/AUTO DIFF WBC: CPT

## 2021-08-22 PROCEDURE — 80053 COMPREHEN METABOLIC PANEL: CPT

## 2021-08-22 PROCEDURE — 2700000000 HC OXYGEN THERAPY PER DAY

## 2021-08-22 PROCEDURE — 2500000003 HC RX 250 WO HCPCS: Performed by: INTERNAL MEDICINE

## 2021-08-22 RX ADMIN — INSULIN LISPRO 1 UNITS: 100 INJECTION, SOLUTION INTRAVENOUS; SUBCUTANEOUS at 17:19

## 2021-08-22 RX ADMIN — DEXAMETHASONE SODIUM PHOSPHATE 6 MG: 4 INJECTION, SOLUTION INTRAMUSCULAR; INTRAVENOUS at 17:18

## 2021-08-22 RX ADMIN — INSULIN LISPRO 2 UNITS: 100 INJECTION, SOLUTION INTRAVENOUS; SUBCUTANEOUS at 10:04

## 2021-08-22 RX ADMIN — ACETAMINOPHEN 650 MG: 325 TABLET ORAL at 16:50

## 2021-08-22 RX ADMIN — ASPIRIN 81 MG: 81 TABLET, COATED ORAL at 22:00

## 2021-08-22 RX ADMIN — Medication 2000 UNITS: at 09:49

## 2021-08-22 RX ADMIN — INSULIN LISPRO 6 UNITS: 100 INJECTION, SOLUTION INTRAVENOUS; SUBCUTANEOUS at 17:19

## 2021-08-22 RX ADMIN — METOPROLOL SUCCINATE 50 MG: 50 TABLET, FILM COATED, EXTENDED RELEASE ORAL at 09:59

## 2021-08-22 RX ADMIN — Medication 2000 UNITS: at 10:00

## 2021-08-22 RX ADMIN — ALBUTEROL SULFATE 2 PUFF: 90 AEROSOL, METERED RESPIRATORY (INHALATION) at 12:18

## 2021-08-22 RX ADMIN — PRAVASTATIN SODIUM 40 MG: 40 TABLET ORAL at 22:00

## 2021-08-22 RX ADMIN — ASPIRIN 81 MG: 81 TABLET, COATED ORAL at 09:59

## 2021-08-22 RX ADMIN — ENOXAPARIN SODIUM 30 MG: 30 INJECTION SUBCUTANEOUS at 09:49

## 2021-08-22 RX ADMIN — ENOXAPARIN SODIUM 30 MG: 30 INJECTION SUBCUTANEOUS at 22:00

## 2021-08-22 RX ADMIN — FAMOTIDINE 20 MG: 10 INJECTION INTRAVENOUS at 09:56

## 2021-08-22 RX ADMIN — ENOXAPARIN SODIUM 30 MG: 30 INJECTION SUBCUTANEOUS at 09:59

## 2021-08-22 RX ADMIN — INSULIN LISPRO 6 UNITS: 100 INJECTION, SOLUTION INTRAVENOUS; SUBCUTANEOUS at 11:53

## 2021-08-22 RX ADMIN — METOPROLOL SUCCINATE 50 MG: 50 TABLET, FILM COATED, EXTENDED RELEASE ORAL at 09:50

## 2021-08-22 RX ADMIN — INSULIN LISPRO 5 UNITS: 100 INJECTION, SOLUTION INTRAVENOUS; SUBCUTANEOUS at 11:53

## 2021-08-22 RX ADMIN — Medication 5 ML: at 22:24

## 2021-08-22 RX ADMIN — INSULIN LISPRO 6 UNITS: 100 INJECTION, SOLUTION INTRAVENOUS; SUBCUTANEOUS at 10:04

## 2021-08-22 RX ADMIN — ASPIRIN 81 MG: 81 TABLET, COATED ORAL at 09:49

## 2021-08-22 RX ADMIN — FAMOTIDINE 20 MG: 10 INJECTION INTRAVENOUS at 09:50

## 2021-08-22 RX ADMIN — INSULIN GLARGINE 9 UNITS: 100 INJECTION, SOLUTION SUBCUTANEOUS at 22:01

## 2021-08-22 RX ADMIN — ALBUTEROL SULFATE 2 PUFF: 90 AEROSOL, METERED RESPIRATORY (INHALATION) at 20:10

## 2021-08-22 ASSESSMENT — PAIN SCALES - GENERAL
PAINLEVEL_OUTOF10: 10
PAINLEVEL_OUTOF10: 0

## 2021-08-22 NOTE — CARE COORDINATION
TEAM ROUNDS COMPLETED. D/C PLAN TO RETURN HOME [id number] [facility]. PT REQUIRING INCREASED OXYGEN NEEDS. CURRENTLY ON HFNC 50L 60%. AVASYS IN ROOM. HAS CHILDREN THAT ASSIST, BUT PT LIVES ALONE WITH PRN/HS 2L. PT MAY NEED PLACEMENT FOR +COVID. WILL NEED PT/OT EVALS WHEN MEDICALLY INDICATED. WILL CONTINUE TO FOLLOW FOR NEEDS.

## 2021-08-22 NOTE — PROGRESS NOTES
Infectious Disease Progress Note       8/22/2021    Patient is a followup regarding covid 19 pneumonia, shortness of breath and increasing infiltrates secondary to covid. Unvaccinated status  Complains of pain in the back in the bed. Currently on airvo  Hard of hearing  Sitting in bed. Neck supple  Chest equal expansion  extrem no pain  No new rash  Back pain is positional    Lab Results   Component Value Date    WBC 7.1 08/22/2021    HGB 13.4 08/22/2021    HCT 37.5 08/22/2021    MCV 89.2 08/22/2021     (H) 08/22/2021     Lab Results   Component Value Date     08/22/2021    K 3.7 08/22/2021    CL 98 08/22/2021    CO2 28 08/22/2021    BUN 50 08/22/2021    CREATININE 0.66 08/22/2021    GLUCOSE 220 08/22/2021    CALCIUM 9.4 08/22/2021        WBC trends are being monitored. Antibiotic doses are being adjusted per most recent renal labs.      Vitals:    08/22/21 0807   BP: 136/68   Pulse: 66   Resp: 18   Temp: 98 °F (36.7 °C)   SpO2: 96%           Patient Active Problem List   Diagnosis    Degeneration of intervertebral disc of lumbosacral region    Age related osteoporosis    Right knee DJD    Hyperlipidemia    HTN (hypertension)    Macular degeneration    Chronic obstructive pulmonary disease (HCC)    Legal blindness    Pseudophakia    Scotoma involving central area, bilateral    Thoracic or lumbosacral neuritis or radiculitis, unspecified    Enthesopathy of hip region    Pulmonary disorder    COVID-19    Pneumonia due to COVID-19 virus           ASSESSMENT:  covid 19 pneumonia  Hypoxia  Hx of pulmonary HTN  Positional back pain    PLAN:  remdesivir and decadron  Supportive care  Pain control for her back    Imaging and labs were reviewed per medical records and any ID pertinent labs were also addressed    Tanya Da Silva DO

## 2021-08-22 NOTE — PROGRESS NOTES
Pulmonary Disorder  (acute or chronic)  [x]   Severe or Chronic w/ Exacerbation  []     Surgical Status No [x]   Surgeries     General []   Surgery Lower []   Abdominal Thoracic or []   Upper Abdominal Thoracic with  PulmonaryDisorder  []     Chest X-ray Clear/Not  Ordered     [x]  Chronic Changes  Results Pending  []  Infiltrates, atelectasis, pleural effusion, or edema  []  Infiltrates in more than one lobe []  Infiltrate + Atelectasis, &/or pleural effusion  []    Respiratory Pattern Regular,  RR = 12-20 [x]  Increased,  RR = 21-25 []  BEAN, irregular,  or RR = 26-30 []  Decreased FEV1  or RR = 31-35 []  Severe SOB, use  of accessory muscles, or RR ? 35  []    Mental Status Alert, oriented,  Cooperative [x]  Confused but Follows commands []  Lethargic or unable to follow commands []  Obtunded  []  Comatose  []    Breath Sounds Clear to  auscultation  []  Decreased unilaterally or  in bases only []  Decreased  bilaterally  [x]  Crackles or intermittent wheezes []  Wheezes []    Cough Strong, Spontan., & nonproductive [x]  Strong,  spontaneous, &  productive []  Weak,  Nonproductive []  Weak, productive or  with wheezes []  No spontaneous  cough or may require suctioning []    Level of Activity Ambulatory []  Ambulatory w/ Assist  [x]  Non-ambulatory []  Paraplegic []  Quadriplegic []    Total    Score:___6____     Triage Score:__4______      Tri       Triage:     1. (>20) Freq: Q3    2. (16-20) Freq: Q4   3. (11-15) Freq: QID & Albuterol Q2 PRN    4. (6-10) Freq: TID & Albuterol Q2 PRN    5. (0-5) Freq Q4prn

## 2021-08-22 NOTE — PROGRESS NOTES
Physician Progress Note    2021   2:42 PM    Name:  Funmilayo Eli  MRN:    [id number]     IP Day: 4     Admit Date: 2021  1:04 PM  PCP: Braulio Mane MD    Code Status:  Full Code      Assessment and Plan: Active Problems/ diagnosis:     Acute hypoxic respiratory failure due to bilateral COVID-19 pneumonia  COVID-19 pneumonia  Hypertension  Hyperlipidemia  Pulmonary hypertension     Plan  1. on 60% high flow   2. Insulin adjusted. She is hyperglycemic  3. Pulmonary is following  4. Resume on remdesivir  5. Appreciate infectious's input. 6. Resume Decadron 6 mg daily  7. Inhaled treatments  8. Resume home medications  9. Lovenox 30 mg twice daily  10. Full code  11. Continue contact and droplet isolation per Covid protocol  Regular diet      Discussed plan of care with patient's daughter over the phone today. I answer all the daughter's questions. Daughter is asking to start the patient hydroxychloroquine. I inform her that this medication is no longer recommended for COVID-19 and in some cases it is harmful. She has also for Actemra. I defer this to ID. Patient's daughter is satisfied with her current care. Subjective:     Dyspnea is improving. She continues to require high flow nasal cannula. Discussed with RN. Physical Examination:      Vitals:  /68   Pulse 66   Temp 98 °F (36.7 °C) (Oral)   Resp 18   Ht 5' 3\" (1.6 m)   Wt 135 lb (61.2 kg)   SpO2 96%   BMI 23.91 kg/m²   Temp (24hrs), Av °F (36.7 °C), Min:98 °F (36.7 °C), Max:98 °F (36.7 °C)      General appearance: alert, cooperative and no distress  Mental Status: oriented to person, place and time and normal affect  Lungs: Diminished bilaterally.   Heart: regular rate and rhythm, no murmur  Abdomen: soft, nontender, nondistended, bowel sounds present, no masses  Extremities: no edema, redness, tenderness in the calves  Skin: no gross lesions, rashes    Data:     Labs:  Recent Labs     21  4420 0.9 % injection 5-40 mL  5-40 mL Intravenous PRN Lizbeth Koyanagi, DO   10 mL at 08/18/21 1932    0.9 % sodium chloride infusion  25 mL Intravenous PRN Lizbeth Koyanagi, DO        ondansetron (ZOFRAN-ODT) disintegrating tablet 4 mg  4 mg Oral Q8H PRN Clover Beth Zach, DO        Or    ondansetron [facility]) injection 4 mg  4 mg Intravenous Q6H PRN Clover Beth Zach, DO        polyethylene glycol (GLYCOLAX) packet 17 g  17 g Oral Daily PRN Lizbeth Koyanagi, DO        acetaminophen (TYLENOL) tablet 650 mg  650 mg Oral Q6H PRN Clover Beth Zach, DO   650 mg at 08/19/21 1414    Or    acetaminophen (TYLENOL) suppository 650 mg  650 mg Rectal Q6H PRN Clover Beth Zach, DO        dexamethasone (DECADRON) injection 6 mg  6 mg Intravenous Q24H Lizbeth Koyanagi, DO   6 mg at 08/21/21 1937    guaiFENesin-dextromethorphan (ROBITUSSIN DM) 100-10 MG/5ML syrup 5 mL  5 mL Oral Q4H PRN Lizbeth Koyanagi, DO        Vitamin D (CHOLECALCIFEROL) tablet 2,000 Units  2,000 Units Oral Daily Lizbeth Koyanagi, DO   2,000 Units at 08/22/21 1000    enoxaparin (LOVENOX) injection 30 mg  30 mg Subcutaneous BID Lizbeth Koyanagi, DO   30 mg at 08/22/21 0959    famotidine (PEPCID) injection 20 mg  20 mg Intravenous Daily Lizbeht Koyanagi, DO   20 mg at 08/22/21 2492    aspirin EC tablet 81 mg  81 mg Oral BID Lizbeth Koyanagi, DO   81 mg at 08/22/21 3954    metoprolol succinate (TOPROL XL) extended release tablet 50 mg  50 mg Oral Daily Lizbeth Koyanagi, DO   50 mg at 08/22/21 4482    pravastatin (PRAVACHOL) tablet 40 mg  40 mg Oral Nightly Lizbeth Koyanagi, DO   40 mg at 08/21/21 2203    remdesivir 100 mg in sodium chloride 0.9 % 250 mL IVPB  100 mg Intravenous Q24H Guillermina Koyanagi,    Stopped at 08/22/21 0015       Additional work up or/and treatment plan may be added today or then after based on clinical progression. I am managing a portion of pt care. Some medical issues are handled by other specialists.  Additional work up and treatment should be done in out pt setting by pt PCP and other out pt providers. In addition to examining and evaluating pt, I spent additional time explaining care, normaland abnormal findings, and treatment plan. All of pt questions were answered. Counseling, diet and education were provided. Case will be discussed with nursing staff when appropriate. Family will be updated if and when appropriate.        Electronically signed by Osei Lucio DO on 8/22/2021 at 2:42 PM

## 2021-08-23 ENCOUNTER — CARE COORDINATION (OUTPATIENT)
Dept: CARE COORDINATION | Age: 86
End: 2021-08-23

## 2021-08-23 LAB
ALBUMIN SERPL-MCNC: 2.8 G/DL (ref 3.5–4.6)
ALP BLD-CCNC: 82 U/L (ref 40–130)
ALT SERPL-CCNC: 22 U/L (ref 0–33)
ANION GAP SERPL CALCULATED.3IONS-SCNC: 12 MEQ/L (ref 9–15)
AST SERPL-CCNC: 18 U/L (ref 0–35)
BASOPHILS ABSOLUTE: 0 K/UL (ref 0–0.2)
BASOPHILS RELATIVE PERCENT: 0.1 %
BILIRUB SERPL-MCNC: 0.6 MG/DL (ref 0.2–0.7)
BLOOD CULTURE, ROUTINE: NORMAL
BUN BLDV-MCNC: 53 MG/DL (ref 8–23)
CALCIUM SERPL-MCNC: 9.3 MG/DL (ref 8.5–9.9)
CHLORIDE BLD-SCNC: 98 MEQ/L (ref 95–107)
CO2: 29 MEQ/L (ref 20–31)
CREAT SERPL-MCNC: 0.71 MG/DL (ref 0.5–0.9)
CULTURE, BLOOD 2: NORMAL
EOSINOPHILS ABSOLUTE: 0 K/UL (ref 0–0.7)
EOSINOPHILS RELATIVE PERCENT: 0.2 %
GFR AFRICAN AMERICAN: >60
GFR NON-AFRICAN AMERICAN: >60
GLOBULIN: 2.8 G/DL (ref 2.3–3.5)
GLUCOSE BLD-MCNC: 223 MG/DL (ref 60–115)
GLUCOSE BLD-MCNC: 236 MG/DL (ref 60–115)
GLUCOSE BLD-MCNC: 255 MG/DL (ref 70–99)
GLUCOSE BLD-MCNC: 311 MG/DL (ref 60–115)
GLUCOSE BLD-MCNC: 70 MG/DL (ref 60–115)
HCT VFR BLD CALC: 36.6 % (ref 37–47)
HEMOGLOBIN: 12.8 G/DL (ref 12–16)
LYMPHOCYTES ABSOLUTE: 0.3 K/UL (ref 1–4.8)
LYMPHOCYTES RELATIVE PERCENT: 3.5 %
MCH RBC QN AUTO: 31 PG (ref 27–31.3)
MCHC RBC AUTO-ENTMCNC: 34.9 % (ref 33–37)
MCV RBC AUTO: 88.7 FL (ref 82–100)
MONOCYTES ABSOLUTE: 0.2 K/UL (ref 0.2–0.8)
MONOCYTES RELATIVE PERCENT: 2.7 %
NEUTROPHILS ABSOLUTE: 7.7 K/UL (ref 1.4–6.5)
NEUTROPHILS RELATIVE PERCENT: 93.5 %
PDW BLD-RTO: 12.1 % (ref 11.5–14.5)
PERFORMED ON: ABNORMAL
PERFORMED ON: NORMAL
PLATELET # BLD: 440 K/UL (ref 130–400)
POTASSIUM REFLEX MAGNESIUM: 3.7 MEQ/L (ref 3.4–4.9)
RBC # BLD: 4.12 M/UL (ref 4.2–5.4)
SODIUM BLD-SCNC: 139 MEQ/L (ref 135–144)
TOTAL PROTEIN: 5.6 G/DL (ref 6.3–8)
WBC # BLD: 8.2 K/UL (ref 4.8–10.8)

## 2021-08-23 PROCEDURE — 6370000000 HC RX 637 (ALT 250 FOR IP): Performed by: INTERNAL MEDICINE

## 2021-08-23 PROCEDURE — 2580000003 HC RX 258: Performed by: INTERNAL MEDICINE

## 2021-08-23 PROCEDURE — 85025 COMPLETE CBC W/AUTO DIFF WBC: CPT

## 2021-08-23 PROCEDURE — 99233 SBSQ HOSP IP/OBS HIGH 50: CPT | Performed by: INTERNAL MEDICINE

## 2021-08-23 PROCEDURE — 6360000002 HC RX W HCPCS: Performed by: INTERNAL MEDICINE

## 2021-08-23 PROCEDURE — 2060000000 HC ICU INTERMEDIATE R&B

## 2021-08-23 PROCEDURE — 94761 N-INVAS EAR/PLS OXIMETRY MLT: CPT

## 2021-08-23 PROCEDURE — 2500000003 HC RX 250 WO HCPCS: Performed by: INTERNAL MEDICINE

## 2021-08-23 PROCEDURE — 94664 DEMO&/EVAL PT USE INHALER: CPT

## 2021-08-23 PROCEDURE — 2700000000 HC OXYGEN THERAPY PER DAY

## 2021-08-23 PROCEDURE — 80053 COMPREHEN METABOLIC PANEL: CPT

## 2021-08-23 PROCEDURE — 36415 COLL VENOUS BLD VENIPUNCTURE: CPT

## 2021-08-23 PROCEDURE — 94640 AIRWAY INHALATION TREATMENT: CPT

## 2021-08-23 PROCEDURE — 99232 SBSQ HOSP IP/OBS MODERATE 35: CPT | Performed by: INTERNAL MEDICINE

## 2021-08-23 RX ADMIN — Medication 10 ML: at 10:03

## 2021-08-23 RX ADMIN — METOPROLOL SUCCINATE 50 MG: 50 TABLET, FILM COATED, EXTENDED RELEASE ORAL at 10:02

## 2021-08-23 RX ADMIN — INSULIN LISPRO 6 UNITS: 100 INJECTION, SOLUTION INTRAVENOUS; SUBCUTANEOUS at 10:04

## 2021-08-23 RX ADMIN — Medication 10 ML: at 21:38

## 2021-08-23 RX ADMIN — ASPIRIN 81 MG: 81 TABLET, COATED ORAL at 21:27

## 2021-08-23 RX ADMIN — ENOXAPARIN SODIUM 30 MG: 30 INJECTION SUBCUTANEOUS at 10:02

## 2021-08-23 RX ADMIN — INSULIN LISPRO 4 UNITS: 100 INJECTION, SOLUTION INTRAVENOUS; SUBCUTANEOUS at 12:59

## 2021-08-23 RX ADMIN — ALBUTEROL SULFATE 2 PUFF: 90 AEROSOL, METERED RESPIRATORY (INHALATION) at 19:54

## 2021-08-23 RX ADMIN — INSULIN LISPRO 6 UNITS: 100 INJECTION, SOLUTION INTRAVENOUS; SUBCUTANEOUS at 12:58

## 2021-08-23 RX ADMIN — REMDESIVIR 100 MG: 100 INJECTION, POWDER, LYOPHILIZED, FOR SOLUTION INTRAVENOUS at 14:41

## 2021-08-23 RX ADMIN — ALBUTEROL SULFATE 2 PUFF: 90 AEROSOL, METERED RESPIRATORY (INHALATION) at 12:22

## 2021-08-23 RX ADMIN — ENOXAPARIN SODIUM 30 MG: 30 INJECTION SUBCUTANEOUS at 21:28

## 2021-08-23 RX ADMIN — INSULIN GLARGINE 9 UNITS: 100 INJECTION, SOLUTION SUBCUTANEOUS at 21:30

## 2021-08-23 RX ADMIN — FAMOTIDINE 20 MG: 10 INJECTION INTRAVENOUS at 10:02

## 2021-08-23 RX ADMIN — PRAVASTATIN SODIUM 40 MG: 40 TABLET ORAL at 21:27

## 2021-08-23 RX ADMIN — ALBUTEROL SULFATE 2 PUFF: 90 AEROSOL, METERED RESPIRATORY (INHALATION) at 08:36

## 2021-08-23 RX ADMIN — ASPIRIN 81 MG: 81 TABLET, COATED ORAL at 10:02

## 2021-08-23 RX ADMIN — DEXAMETHASONE SODIUM PHOSPHATE 6 MG: 4 INJECTION, SOLUTION INTRAMUSCULAR; INTRAVENOUS at 18:10

## 2021-08-23 RX ADMIN — INSULIN LISPRO 2 UNITS: 100 INJECTION, SOLUTION INTRAVENOUS; SUBCUTANEOUS at 10:04

## 2021-08-23 RX ADMIN — Medication 2000 UNITS: at 10:02

## 2021-08-23 ASSESSMENT — ENCOUNTER SYMPTOMS
SHORTNESS OF BREATH: 1
COUGH: 1
GASTROINTESTINAL NEGATIVE: 1

## 2021-08-23 ASSESSMENT — PAIN SCALES - GENERAL: PAINLEVEL_OUTOF10: 8

## 2021-08-23 NOTE — PROGRESS NOTES
INPATIENT PROGRESS NOTES    PATIENT NAME: Doreen Barraza  MRN: [id number]  SERVICE DATE:  August 23, 2021   SERVICE TIME:  4:33 PM      PRIMARY SERVICE: Pulmonary Disease    CHIEF COMPLAIN: COVID-19 pneumonia and respiratory failure      INTERVAL HPI: Patient seen and examined at bedside, Interval Notes, orders reviewed. Nursing notes noted  She is on high flow oxygen. Feels better. Still around 50 L and 65% FiO2. O2 saturation is 92%. She is feeling weak. Still mild short of breath. No chest pain. No nausea vomiting diarrhea. No fever or chills. OBJECTIVE    Body mass index is 23.91 kg/m². PHYSICAL EXAM:  Vitals:  /87   Pulse 82   Temp 99 °F (37.2 °C) (Oral)   Resp 18   Ht 5' 3\" (1.6 m)   Wt 135 lb (61.2 kg)   SpO2 92%   BMI 23.91 kg/m²   General: Alert, awake . comfortable in bed, No distress. Head: Atraumatic , Normocephalic   Eyes: PERRL. No sclera icterus. No conjunctival injection. No discharge   ENT: No nasal  discharge. Pharynx clear. Neck:  Trachea midline. No thyromegaly, no JVD, No cervical adenopathy. Chest : Bilaterally symmetrical ,Normal effort,  No accessory muscle use  Lung : . Fair BS bilateral, decreased BS at bases. Bibasilar Rales. No wheezing. No rhonchi. Heart[de-identified] Normal  rate. Regular rhythm. No mumur ,  Rub or gallop  ABD: Non-tender. Non-distended. No masses. No organmegaly. Normal bowel sounds. No hernia.   Ext : No Pitting both leg , No Cyanosis No clubbing  Neuro: no focal weakness          DATA:   Recent Labs     08/22/21 0633 08/23/21 0518   WBC 7.1 8.2   HGB 13.4 12.8   HCT 37.5 36.6*   MCV 89.2 88.7   * 440*     Recent Labs     08/22/21 0633 08/22/21 0633 08/23/21 0518     --  139   K 3.7  --  3.7   CL 98  --  98   CO2 28  --  29   BUN 50*  --  53*   CREATININE 0.66  --  0.71   GLUCOSE 220*  --  255*   CALCIUM 9.4  --  9.3   PROT 5.9*  --  5.6*   LABALBU 3.0*  --  2.8*   BILITOT 0.6  --  0.6   ALKPHOS 80  --  82   AST 21  --  18   ALT 22 < > 22   LABGLOM >60.0   < > >60.0   GFRAA >60.0   < > >60.0   GLOB 2.9   < > 2.8    < > = values in this interval not displayed. MV Settings:     FiO2 : 65 %    Recent Labs     08/21/21 2010   PHART 7.533*   [id number] 36   PO2ART 60*   QLP3VZJ 30.3*   BEART 8*   P1MFBMDT 93*       O2 Device: High flow nasal cannula (**corrected, patient on HFNC, not RA)  O2 Flow Rate (L/min): 50 L/min    ADULT DIET; Regular; Safety Tray; Safety Tray (Disposables)  Adult Oral Nutrition Supplement; Standard High Calorie/High Protein Oral Supplement     MEDICATIONS during current hospitalization:    Continuous Infusions:   dextrose      sodium chloride         Scheduled Meds:   insulin glargine  0.15 Units/kg Subcutaneous Nightly    insulin lispro  0.1 Units/kg Subcutaneous TID WC    insulin lispro  0-6 Units Subcutaneous TID WC    insulin lispro  0-3 Units Subcutaneous Nightly    albuterol sulfate HFA  2 puff Inhalation TID    sodium chloride flush  5-40 mL Intravenous 2 times per day    dexamethasone  6 mg Intravenous Q24H    Vitamin D  2,000 Units Oral Daily    enoxaparin  30 mg Subcutaneous BID    famotidine (PEPCID) injection  20 mg Intravenous Daily    aspirin  81 mg Oral BID    metoprolol succinate  50 mg Oral Daily    pravastatin  40 mg Oral Nightly       PRN Meds:glucose, dextrose, glucagon (rDNA), dextrose, albuterol sulfate HFA, sodium chloride flush, sodium chloride, ondansetron **OR** ondansetron, polyethylene glycol, acetaminophen **OR** acetaminophen, guaiFENesin-dextromethorphan    Radiology  CTA Chest W WO  (PE study)    Result Date: 8/18/2021  The EXAMINATION: CT scan of the chest with contrast (pulmonary embolism protocol) INDICATION: Chest pain and shortness of breath. COMPARISON: None TECHNIQUE: Helical CT was performed through the chest utilizing 100 cc of Isovue-300 intravenous contrast.  Images were obtained with bolus tracking in order to opacify the pulmonary arteries.   Both MIP and 3D volume rendered reconstructions were performed. FINDINGS: There are no findings central, proximal proximal-segmental pulmonary emboli. There is mild to moderate emphysematous disease. There is areas of patchy multifocal to coalescent airspace disease throughout the lung parenchyma which has shown interval progression, worsening as compared to the prior examination. No pleural effusions. No pneumothoraces. There is nonspecific subcentimeter periaortic adenopathy. There is pretracheal adenopathy. There is bilateral perihilar adenopathy. No significant subcarinal adenopathy. In the field-of-view the abdomen is a moderate to large hiatal hernia. There is multilevel degenerative changes with bridging osteophytes of the thoracic spine superpose upon a mild dorsal kyphosis. Nodule left lobe of thyroid measuring approximately 9 mm. 1.NO FINDINGS CENTRAL, PROXIMAL OR PROXIMAL- SEGMENTAL PULMONARY EMBOLI. .  2. THERE IS MILD EMPHYSEMATOUS DISEASE AND CHRONIC INTERSTITIAL CHANGES WITH WORSENING AREAS OF PATCHY MULTIFOCAL to COALESCENT AIRSPACE DISEASE WITHIN THE LUNG PARENCHYMA. COMMONLY REPORT IMAGING FEATURES OF (COVID-19) PNEUMONIA ARE PRESENT. OTHER PROCESSES SUCH AS INFLUENZA, PNEUMONIA AND ORGANIZING PNEUMONIA AS CAN BE SEEN WITH DRUG TOXICITY AND CONNECTIVE TISSUE DISEASE CAN CAUSE A SIMILAR IMAGING PATTERN. 3. MODERATE TO LARGE HIATAL HERNIA. 4. NODULE LEFT LOBE OF THYROID. CORRELATE CLINICALLY FOLLOW-UP Other findings detailed above All CT scans at this facility use dose modulation, iterative reconstruction, and/or weight based dosing when appropriate to reduce radiation dose to as low as reasonably achievable. CTA Chest W WO  (PE study)    Result Date: 8/13/2021  EXAM:CTA CHEST W WO CONTRAST DATE8/13/2021 1:07 PM: REASON FOR EXAM:Acute severe anterior chest wall pain.   covid hypoxia COMPARISON: none Technique: Helical CT was performed through the chest following the IV administration of100  cc of nonionic contrast. 3-D MIP reconstructions were performed on an independent workstation in the coronal plane with thick slab technique utilized in the interpretation. 3-D maximum intensity projection performed. All CT scans at this facility use dose modulation, iterative reconstruction, and/or weight based dosing when appropriate to reduce radiation dose to as low as reasonably achievable. CHEST CTA  FINDINGS: There is an 11 mm inhomogeneous nodule in the left lobe of the thyroid gland. Mediastinum: There are mild hilar and mediastinal lymph nodes, likely related to reactive nodes. The chest wall and lower neck are normal Heart: The heart is enlarged. There is no pericardial effusion. Vascular structures: There is no evidence for thoracic aortic aneurysm or dissection. No evidence of traumatic aortic injury. There are no persistent filling defects within the pulmonary arteries. Lungs: There are patchy groundglass alveolar alveolar opacities in both lungs worrisome for early infiltrates possible viral pneumonia, COVID-19. Pleura: No pleural effusion or thickening. Upper abdomen: There is a 3.5 x 5.4 cm hiatal hernia containing the proximal stomach. The visualized portions of the upper abdomen are unremarkable. Bones/axillae/soft tissues: Osseous structures and chest wall are normal.     Negative CTA of the chest. No evidence of thoracic aortic dissection or aneurysm. The study is negative for pulmonary emboli. There are patchy bilateral alveolar opacities worrisome for early infiltrates possible viral pneumonia. Differential diagnosis includes COVID-19. XR CHEST PORTABLE    Result Date: 8/21/2021  EXAMINATION: CHEST PORTABLE VIEW  CLINICAL HISTORY: Worsening hypoxia COMPARISONS: August 18, 2021 1412 hours  FINDINGS: Single  views of the chest is submitted. The cardiac silhouette is enlarged. . Pulmonary vascular attenuated. Right sided trachea.  Increasing bibasilar patchy multifocal to coalescent airspace disease particularly within the bases. No Pneumothoraces. INCREASING BIBASILAR PATCHY MULTIFOCAL TO COALESCENT AIRSPACE DISEASE PARTICULARLY WITHIN THE BASES SUPERIMPOSED UPON RADIOGRAPHIC FINDINGS OF COPD. CORRELATE CLINICALLY    XR CHEST PORTABLE    Result Date: 8/18/2021  EXAMINATION: Portable AP ERECT view of the chest. CLINICAL HISTORY:  sob, hypoxia , Positive Covid-19 test. DATE: 8/18/2021 2:01 PM COMPARISONS: 8/13/2021 FINDINGS: The heart is mildly enlarged, unchanged. There are atherosclerotic calcifications in the aortic arch. There are peripheral groundglass infiltrates in the mid lungs in lung bases. Infiltrates mildly increased since prior exam. Findings may be secondary to viral pneumonia. Patient also be secondary to fluid overload or congestive heart failure. No pleural effusion or pneumothorax. The bony thorax is unremarkable. PERIPHERAL GROUNDGLASS INFILTRATES IN BOTH LUNGS, PRIMARILY IN THE MIDLUNGS AND LUNG BASES. MILD INCREASING INFILTRATES. DIFFERENTIAL DIAGNOSIS INCLUDES FLUID OVERLOAD, CHF OR VIRAL PNEUMONIA. XR CHEST PORTABLE    Result Date: 8/13/2021  Exam: XR CHEST PORTABLE History:  cough Technique: AP portable view of the chest obtained. Comparison: Chest x-ray from March 13, 2018 Chest x-ray portable Findings: The cardiac silhouette is enlarged. There are mild increased interstitial markings of both lungs which may reflect fluid overload versus congestive heart failure. There are no pleural effusions. . Bones of the thorax appear intact. Increased pulmonary markings indicate fluid overload, CHF, or viral pneumonia. IMPRESSION AND SUGGESTION:  1. Acute hypoxic respiratory failure on high flow O2  2. COVID-19 infection with pneumonia  3. IDDM  4. Hypertension  5. Hyperlipidemia    Patient is still on high flow oxygen. Now she is on 65% FiO2. O2 saturation is 92%.   Continue remdesivir last dose today.  Decadron. Last chest x-ray showing patchy multifocal infiltration. Continue bronchodilator therapy. ID is following. I spent more than 35 min with this patient's care , greater the 50% of this time was spent in counseling and/or coordinating of care. NOTE: This report was transcribed using voice recognition software. Every effort was made to ensure accuracy; however, inadvertent computerized transcription errors may be present.       Electronically signed by Bibi Gomez MD, FCCP on 8/23/2021 at 4:33 PM

## 2021-08-23 NOTE — CARE COORDINATION
LSW spoke with Abhi Ovalles who sees the pt in the community to assist with needs. Daughter called her with some concerns. Daughter would like to bring food in for the pt. LSW explained that they can drop off food and staff will take it in to the pt. Pt will need oxygen set up for home at DC as her concentrator is not working and she purchased it herself. Jaycee Munroe from Medical services is aware and Early Number from care coordination will follow for appropriate orders. LSW will follow for DC planning.

## 2021-08-23 NOTE — PROGRESS NOTES
[facility] Respiratory Therapy Evaluation   Current Order:  Alb inhaler tid     Home Regimen:  none    Ordering Physician: chris  Re-evaluation Date:  8/26    Diagnosis: covid 19      Patient Status: Stable / Unstable + Physician notified    The following MDI Criteria must be met in order to convert aerosol to MDI with spacer. If unable to meet, MDI will be converted to aerosol:  []  Patient able to demonstrate the ability to use MDI effectively  []  Patient alert and cooperative  []  Patient able to take deep breath with 5-10 second hold  []  Medication(s) available in this delivery method   []  Peak flow greater than or equal to 200 ml/min            Current Order Substituted To  (same drug, same frequency)   Aerosol to MDI [] Albuterol Sulfate 0.083% unit dose by aerosol Albuterol Sulfate MDI 2 puffs by inhalation with spacer    [] Levalbuterol 1.25 mg unit dose by aerosol Levalbuterol MDI 2 puffs by inhalation with spacer    [] Levalbuterol 0.63 mg unit dose by aerosol Levalbuterol MDI 2 puffs by inhalation with spacer    [] Ipratropium Bromide 0.02% unit dose by aerosol Ipratropium Bromide MDI 2 puffs by inhalation with spacer    [] Duoneb (Ipratropium + Albuterol) unit dose by aerosol Ipratropium MDI + Albuterol MDI 2 puffs by inhalation w/spacer   MDI to Aerosol [] Albuterol Sulfate MDI Albuterol Sulfate 0.083% unit dose by aerosol    [] Levalbuterol MDI 2 puffs by inhalation Levalbuterol 1.25 mg unit dose by aerosol    [] Ipratropium Bromide MDI by inhalation Ipratropium Bromide 0.02% unit dose by aerosol    [] Combivent (Ipratropium + Albuterol) MDI by inhalation Duoneb (Ipratropium + Albuterol) unit dose by aerosol       Treatment Assessment [Frequency/Schedule]:  Change frequency to: ______________________no change____________________________per Protocol, P&T, MEC      Points 0 1 2 3 4   Pulmonary Status  Non-Smoker  []   Smoking history   < 20 pack years  []   Smoking history  ?  20 pack years  [] Pulmonary Disorder  (acute or chronic)  [x]   Severe or Chronic w/ Exacerbation  []     Surgical Status No [x]   Surgeries     General []   Surgery Lower []   Abdominal Thoracic or []   Upper Abdominal Thoracic with  PulmonaryDisorder  []     Chest X-ray Clear/Not  Ordered     []  Chronic Changes  Results Pending  []  Infiltrates, atelectasis, pleural effusion, or edema  [x]  Infiltrates in more than one lobe []  Infiltrate + Atelectasis, &/or pleural effusion  []    Respiratory Pattern Regular,  RR = 12-20 [x]  Increased,  RR = 21-25 []  BEAN, irregular,  or RR = 26-30 []  Decreased FEV1  or RR = 31-35 []  Severe SOB, use  of accessory muscles, or RR ? 35  []    Mental Status Alert, oriented,  Cooperative [x]  Confused but Follows commands []  Lethargic or unable to follow commands []  Obtunded  []  Comatose  []    Breath Sounds Clear to  auscultation  []  Decreased unilaterally or  in bases only []  Decreased  bilaterally  [x]  Crackles or intermittent wheezes []  Wheezes []    Cough Strong, Spontan., & nonproductive []  Strong,  spontaneous, &  productive [x]  Weak,  Nonproductive []  Weak, productive or  with wheezes []  No spontaneous  cough or may require suctioning []    Level of Activity Ambulatory []  Ambulatory w/ Assist  [x]  Non-ambulatory []  Paraplegic []  Quadriplegic []    Total    Score:____9___     Triage Score:_____4___      Tri       Triage:     1. (>20) Freq: Q3    2. (16-20) Freq: Q4   3. (11-15) Freq: QID & Albuterol Q2 PRN    4. (6-10) Freq: TID & Albuterol Q2 PRN    5. (0-5) Freq Q4prn

## 2021-08-23 NOTE — CARE COORDINATION
Order for home 02 placed on blue chart for signature with anticipating set up before pt arrives home. [facility] if he should arrive on unit.  Electronically signed by Abner Garrison RN on 8/23/2021 at 3:39 PM

## 2021-08-23 NOTE — PROGRESS NOTES
Department of Internal Medicine  General Internal Medicine  Attending Progress Note      SUBJECTIVE:  Pt seen and examined. Last dose of remdesivir not given overnight as order got cancelled instead of completed. Spoke with pharmacy who placed new order for the last dose to be given today. Pt without complaints. States breathing feels better today.  Continues to be on high flow    OBJECTIVE      Medications    Current Facility-Administered Medications: insulin glargine (LANTUS) injection vial 9 Units, 0.15 Units/kg, Subcutaneous, Nightly  insulin lispro (HUMALOG) injection vial 6 Units, 0.1 Units/kg, Subcutaneous, TID WC  glucose (GLUTOSE) 40 % oral gel 15 g, 15 g, Oral, PRN  dextrose 50 % IV solution, 12.5 g, Intravenous, PRN  glucagon (rDNA) injection 1 mg, 1 mg, Intramuscular, PRN  dextrose 5 % solution, 100 mL/hr, Intravenous, PRN  insulin lispro (HUMALOG) injection vial 0-6 Units, 0-6 Units, Subcutaneous, TID WC  insulin lispro (HUMALOG) injection vial 0-3 Units, 0-3 Units, Subcutaneous, Nightly  albuterol sulfate  (90 Base) MCG/ACT inhaler 2 puff, 2 puff, Inhalation, TID  albuterol sulfate  (90 Base) MCG/ACT inhaler 2 puff, 2 puff, Inhalation, Q2H PRN  sodium chloride flush 0.9 % injection 5-40 mL, 5-40 mL, Intravenous, 2 times per day  sodium chloride flush 0.9 % injection 5-40 mL, 5-40 mL, Intravenous, PRN  0.9 % sodium chloride infusion, 25 mL, Intravenous, PRN  ondansetron (ZOFRAN-ODT) disintegrating tablet 4 mg, 4 mg, Oral, Q8H PRN **OR** ondansetron (ZOFRAN) injection 4 mg, 4 mg, Intravenous, Q6H PRN  polyethylene glycol (GLYCOLAX) packet 17 g, 17 g, Oral, Daily PRN  acetaminophen (TYLENOL) tablet 650 mg, 650 mg, Oral, Q6H PRN **OR** acetaminophen (TYLENOL) suppository 650 mg, 650 mg, Rectal, Q6H PRN  dexamethasone (DECADRON) injection 6 mg, 6 mg, Intravenous, Q24H  guaiFENesin-dextromethorphan (ROBITUSSIN DM) 100-10 MG/5ML syrup 5 mL, 5 mL, Oral, Q4H PRN  Vitamin D (CHOLECALCIFEROL) tablet 2,000 Units, 2,000 Units, Oral, Daily  enoxaparin (LOVENOX) injection 30 mg, 30 mg, Subcutaneous, BID  famotidine (PEPCID) injection 20 mg, 20 mg, Intravenous, Daily  aspirin EC tablet 81 mg, 81 mg, Oral, BID  metoprolol succinate (TOPROL XL) extended release tablet 50 mg, 50 mg, Oral, Daily  pravastatin (PRAVACHOL) tablet 40 mg, 40 mg, Oral, Nightly  Physical    VITALS:  /87   Pulse 82   Temp 99 °F (37.2 °C) (Oral)   Resp 18   Ht 5' 3\" (1.6 m)   Wt 135 lb (61.2 kg)   SpO2 92%   BMI 23.91 kg/m²   Constitutional: Awake and alert in no acute distress. Lying in bed comfortably  Head: Normocephalic, atraumatic  Eyes: EOMI, PERRLA  ENT: moist mucous membranes, high flow nasal cannula  Neck: neck supple, trachea midline  Lungs: Good inspiratory effort, no wheeze, no rhonchi, no rales  Heart: RRR, normal S1 and S2  GI: Soft, non-distended, non tender, no guarding, no rebound, +BS  MSK: no edema noted  Skin: warm, dry  Psych: appropriate affect     Data    CBC:   Lab Results   Component Value Date    WBC 8.2 08/23/2021    RBC 4.12 08/23/2021    HGB 12.8 08/23/2021    HCT 36.6 08/23/2021    MCV 88.7 08/23/2021    MCH 31.0 08/23/2021    MCHC 34.9 08/23/2021    RDW 12.1 08/23/2021     08/23/2021     CMP:    Lab Results   Component Value Date     08/23/2021    K 3.7 08/23/2021    CL 98 08/23/2021    CO2 29 08/23/2021    BUN 53 08/23/2021    CREATININE 0.71 08/23/2021    GFRAA >60.0 08/23/2021    LABGLOM >60.0 08/23/2021    GLUCOSE 255 08/23/2021    PROT 5.6 08/23/2021    LABALBU 2.8 08/23/2021    CALCIUM 9.3 08/23/2021    BILITOT 0.6 08/23/2021    ALKPHOS 82 08/23/2021    AST 18 08/23/2021    ALT 22 08/23/2021       ASSESSMENT AND PLAN      # Acute hypoxic respiratory failure 2/2 COVID-19 pneumonia  - continues to require high flow nasal cannula - wean as tolerated  - last dose of Remdesivir today.  Continuing decadron  - ID consulted, pulm consulted    # IDDM  - continue to monitor while on steroids  - lantus, ISS    # HTN/HLD  - cont home meds    DVT: lovenox per covid protocol    Disposition: Pt feels well. Still on high flow. Wean as tolerated. Last dose of remdesivir today. Continuing decadron. ID and pulm consulted.        [facility] DO  Internal Medicine

## 2021-08-23 NOTE — CARE COORDINATION
ACM RECEIVED CALL FROM DAUGHTER OF PATIENT. SHE REPORTS PATIENT ADMITTED. SHE HAS QUESTIONS REGARDING, THEIR NEED FOR 02 CONCENTRATOR. AS WE DISCUSSED LAST WEEK THEY NEED A NEW ORDER. SINCE PATIENT WAS ADMITTED SHE DID NOT HAVE VV. WITH PULM. SHE ALSO HAS CONCERN REGARDING PATIENT COMPLAINING HOW HOT IT IS IN HER ROOM. PER DAUGHTER THE NURSE WAS GOING TO CALL MAINTENANCE. PER DAUGHTER PATIENT STATED NO ONE HAS ADDRESSED. PATIENT HASN'T BEEN EATING WELL, DAUGHTER SPOKE TO NURSES AND ASKED IF THEY CAN BRING UP FOOD AND DROP OFF. PER DAUGHTER NO RESPONSE. AC CALLED RAFITA RUDOLPH. REVIEWED CONCERNS. SHE WILL LOOK INTO CONCERNS. SHE ALSO WANTED [facility] TO PROVIDE DAUGHTER WITH HER DIRECT CONTACT. [phone]. ACM REVIEWED ITEMS WITH DAUGHTER AND SHE WILL FU WITH SUSANAW    ACM WILL SIGN OFF SINCE PATIENT IS CURRENTLY ADMITTED .

## 2021-08-23 NOTE — PROGRESS NOTES
Infectious Disease     Patient Name: Macrina Gamboa  Date: 8/23/2021  YOB: 1932  Medical Record Number: [id number]      COVID-19 pneumonia        History of Present Illness:  Macular degeneration    Tested positive for COVID-19 13 2021  Return to the emergency room with worsening shortness of breath increasing dyspnea on exertion room air saturation 75% on presentation x-ray showing increasing infiltrates  After her ER visit she was to receive monoclonal antibody as an outpatient  Patient is not vaccinated        The EXAMINATION: CT scan of the chest with contrast (pulmonary embolism protocol)       INDICATION: Chest pain and shortness of breath.       COMPARISON: None       TECHNIQUE: Helical CT was performed through the chest utilizing 100 cc of Isovue-300 intravenous contrast.  Images were obtained with bolus tracking in order to opacify the pulmonary arteries.  Both MIP and 3D volume rendered reconstructions were    performed.       FINDINGS: There are no findings central, proximal proximal-segmental pulmonary emboli.       There is mild to moderate emphysematous disease. There is areas of patchy multifocal to coalescent airspace disease throughout the lung parenchyma which has shown interval progression, worsening as compared to the prior examination. No pleural effusions. No pneumothoraces.       There is nonspecific subcentimeter periaortic adenopathy. There is pretracheal adenopathy. There is bilateral perihilar adenopathy. No significant subcarinal adenopathy.       In the field-of-view the abdomen is a moderate to large hiatal hernia.       There is multilevel degenerative changes with bridging osteophytes of the thoracic spine superpose upon a mild dorsal kyphosis.       Nodule left lobe of thyroid measuring approximately 9 mm.               Impression   1. NO FINDINGS CENTRAL, PROXIMAL OR PROXIMAL- SEGMENTAL PULMONARY EMBOLI. .     2.  THERE IS MILD EMPHYSEMATOUS DISEASE AND CHRONIC INTERSTITIAL CHANGES WITH WORSENING AREAS OF PATCHY MULTIFOCAL to COALESCENT AIRSPACE DISEASE WITHIN THE LUNG PARENCHYMA. COMMONLY REPORT IMAGING FEATURES OF (COVID-19) PNEUMONIA ARE PRESENT. OTHER    PROCESSES SUCH AS INFLUENZA, PNEUMONIA AND ORGANIZING PNEUMONIA AS CAN BE SEEN WITH DRUG TOXICITY AND CONNECTIVE TISSUE DISEASE CAN CAUSE A SIMILAR IMAGING PATTERN. 3. MODERATE TO LARGE HIATAL HERNIA. 4. NODULE LEFT LOBE OF THYROID. CORRELATE CLINICALLY FOLLOW-UP       Other findings detailed above       EXAMINATION: Portable AP ERECT view of the chest.       CLINICAL HISTORY:  sob, hypoxia , Positive Covid-19 test.        DATE: 8/18/2021 2:01 PM       COMPARISONS: 8/13/2021       FINDINGS: The heart is mildly enlarged, unchanged. There are atherosclerotic calcifications in the aortic arch. There are peripheral groundglass infiltrates in the mid lungs in lung bases. Infiltrates mildly increased since prior exam. Findings may be    secondary to viral pneumonia. Patient also be secondary to fluid overload or congestive heart failure. No pleural effusion or pneumothorax.  The bony thorax is unremarkable.           Impression   PERIPHERAL GROUNDGLASS INFILTRATES IN BOTH LUNGS, PRIMARILY IN THE MIDLUNGS AND LUNG BASES. MILD INCREASING INFILTRATES. DIFFERENTIAL DIAGNOSIS INCLUDES FLUID OVERLOAD, CHF OR VIRAL PNEUMONIA. 92% saturated 50 L a minute        Review of Systems   Constitutional: Negative for chills, diaphoresis, fatigue and fever. Respiratory: Positive for cough and shortness of breath. Cardiovascular: Negative. Gastrointestinal: Negative. Physical Exam  Constitutional:       Appearance: She is ill-appearing. Cardiovascular:      Heart sounds: Normal heart sounds. No murmur heard. Pulmonary:      Effort: Pulmonary effort is normal. No respiratory distress. Breath sounds: Normal breath sounds. No stridor. No wheezing, rhonchi or rales.    Abdominal:      General: Abdomen is flat. Bowel sounds are normal. There is no distension. Palpations: There is no mass. Tenderness: There is no abdominal tenderness. There is no guarding. Blood pressure 137/87, pulse 82, temperature 99 °F (37.2 °C), temperature source Oral, resp. rate 18, height 5' 3\" (1.6 m), weight 135 lb (61.2 kg), SpO2 92 %, not currently breastfeeding.       .   Lab Results   Component Value Date    WBC 8.2 08/23/2021    HGB 12.8 08/23/2021    HCT 36.6 (L) 08/23/2021    MCV 88.7 08/23/2021     (H) 08/23/2021     Lab Results   Component Value Date     08/23/2021    K 3.7 08/23/2021    CL 98 08/23/2021    CO2 29 08/23/2021    BUN 53 08/23/2021    CREATININE 0.71 08/23/2021    GLUCOSE 255 08/23/2021    CALCIUM 9.3 08/23/2021              PLAN:    COVID-19 pneumonia   dexamethasone remdesivir O2 coagulation

## 2021-08-23 NOTE — PROGRESS NOTES
Pt a&ox4, vss, satting above 95% in 50L high flow NC.  No pain, up to bathroom twice throughout shift, will continue to monitor

## 2021-08-24 LAB
ALBUMIN SERPL-MCNC: 2.9 G/DL (ref 3.5–4.6)
ALP BLD-CCNC: 87 U/L (ref 40–130)
ALT SERPL-CCNC: 23 U/L (ref 0–33)
ANION GAP SERPL CALCULATED.3IONS-SCNC: 10 MEQ/L (ref 9–15)
AST SERPL-CCNC: 18 U/L (ref 0–35)
BASOPHILS ABSOLUTE: 0 K/UL (ref 0–0.2)
BASOPHILS RELATIVE PERCENT: 0.1 %
BILIRUB SERPL-MCNC: 0.6 MG/DL (ref 0.2–0.7)
BUN BLDV-MCNC: 49 MG/DL (ref 8–23)
CALCIUM SERPL-MCNC: 9.3 MG/DL (ref 8.5–9.9)
CHLORIDE BLD-SCNC: 99 MEQ/L (ref 95–107)
CO2: 29 MEQ/L (ref 20–31)
CREAT SERPL-MCNC: 0.64 MG/DL (ref 0.5–0.9)
EOSINOPHILS ABSOLUTE: 0 K/UL (ref 0–0.7)
EOSINOPHILS RELATIVE PERCENT: 0.1 %
GFR AFRICAN AMERICAN: >60
GFR NON-AFRICAN AMERICAN: >60
GLOBULIN: 3 G/DL (ref 2.3–3.5)
GLUCOSE BLD-MCNC: 132 MG/DL (ref 60–115)
GLUCOSE BLD-MCNC: 188 MG/DL (ref 60–115)
GLUCOSE BLD-MCNC: 196 MG/DL (ref 60–115)
GLUCOSE BLD-MCNC: 229 MG/DL (ref 60–115)
GLUCOSE BLD-MCNC: 253 MG/DL (ref 70–99)
HCT VFR BLD CALC: 37 % (ref 37–47)
HEMOGLOBIN: 13 G/DL (ref 12–16)
LYMPHOCYTES ABSOLUTE: 0.3 K/UL (ref 1–4.8)
LYMPHOCYTES RELATIVE PERCENT: 3.2 %
MCH RBC QN AUTO: 31.2 PG (ref 27–31.3)
MCHC RBC AUTO-ENTMCNC: 35.3 % (ref 33–37)
MCV RBC AUTO: 88.4 FL (ref 82–100)
MONOCYTES ABSOLUTE: 0.4 K/UL (ref 0.2–0.8)
MONOCYTES RELATIVE PERCENT: 4.5 %
NEUTROPHILS ABSOLUTE: 8.7 K/UL (ref 1.4–6.5)
NEUTROPHILS RELATIVE PERCENT: 92.1 %
PDW BLD-RTO: 12.6 % (ref 11.5–14.5)
PERFORMED ON: ABNORMAL
PLATELET # BLD: 466 K/UL (ref 130–400)
POTASSIUM REFLEX MAGNESIUM: 3.8 MEQ/L (ref 3.4–4.9)
RBC # BLD: 4.18 M/UL (ref 4.2–5.4)
SODIUM BLD-SCNC: 138 MEQ/L (ref 135–144)
TOTAL PROTEIN: 5.9 G/DL (ref 6.3–8)
WBC # BLD: 9.5 K/UL (ref 4.8–10.8)

## 2021-08-24 PROCEDURE — 99233 SBSQ HOSP IP/OBS HIGH 50: CPT | Performed by: INTERNAL MEDICINE

## 2021-08-24 PROCEDURE — 6360000002 HC RX W HCPCS: Performed by: INTERNAL MEDICINE

## 2021-08-24 PROCEDURE — 6370000000 HC RX 637 (ALT 250 FOR IP): Performed by: INTERNAL MEDICINE

## 2021-08-24 PROCEDURE — 85025 COMPLETE CBC W/AUTO DIFF WBC: CPT

## 2021-08-24 PROCEDURE — 2500000003 HC RX 250 WO HCPCS: Performed by: INTERNAL MEDICINE

## 2021-08-24 PROCEDURE — 2700000000 HC OXYGEN THERAPY PER DAY

## 2021-08-24 PROCEDURE — 2060000000 HC ICU INTERMEDIATE R&B

## 2021-08-24 PROCEDURE — 36415 COLL VENOUS BLD VENIPUNCTURE: CPT

## 2021-08-24 PROCEDURE — 94761 N-INVAS EAR/PLS OXIMETRY MLT: CPT

## 2021-08-24 PROCEDURE — 99232 SBSQ HOSP IP/OBS MODERATE 35: CPT | Performed by: INTERNAL MEDICINE

## 2021-08-24 PROCEDURE — 94640 AIRWAY INHALATION TREATMENT: CPT

## 2021-08-24 PROCEDURE — 80053 COMPREHEN METABOLIC PANEL: CPT

## 2021-08-24 PROCEDURE — 2580000003 HC RX 258: Performed by: INTERNAL MEDICINE

## 2021-08-24 RX ADMIN — INSULIN LISPRO 1 UNITS: 100 INJECTION, SOLUTION INTRAVENOUS; SUBCUTANEOUS at 09:00

## 2021-08-24 RX ADMIN — ENOXAPARIN SODIUM 30 MG: 30 INJECTION SUBCUTANEOUS at 11:10

## 2021-08-24 RX ADMIN — ASPIRIN 81 MG: 81 TABLET, COATED ORAL at 21:45

## 2021-08-24 RX ADMIN — ALBUTEROL SULFATE 2 PUFF: 90 AEROSOL, METERED RESPIRATORY (INHALATION) at 08:32

## 2021-08-24 RX ADMIN — METOPROLOL SUCCINATE 50 MG: 50 TABLET, FILM COATED, EXTENDED RELEASE ORAL at 09:00

## 2021-08-24 RX ADMIN — INSULIN LISPRO 6 UNITS: 100 INJECTION, SOLUTION INTRAVENOUS; SUBCUTANEOUS at 12:00

## 2021-08-24 RX ADMIN — INSULIN GLARGINE 9 UNITS: 100 INJECTION, SOLUTION SUBCUTANEOUS at 21:45

## 2021-08-24 RX ADMIN — ALBUTEROL SULFATE 2 PUFF: 90 AEROSOL, METERED RESPIRATORY (INHALATION) at 20:56

## 2021-08-24 RX ADMIN — ASPIRIN 81 MG: 81 TABLET, COATED ORAL at 11:10

## 2021-08-24 RX ADMIN — INSULIN LISPRO 6 UNITS: 100 INJECTION, SOLUTION INTRAVENOUS; SUBCUTANEOUS at 09:00

## 2021-08-24 RX ADMIN — PRAVASTATIN SODIUM 40 MG: 40 TABLET ORAL at 21:45

## 2021-08-24 RX ADMIN — ALBUTEROL SULFATE 2 PUFF: 90 AEROSOL, METERED RESPIRATORY (INHALATION) at 12:30

## 2021-08-24 RX ADMIN — ENOXAPARIN SODIUM 30 MG: 30 INJECTION SUBCUTANEOUS at 21:45

## 2021-08-24 RX ADMIN — Medication 10 ML: at 21:45

## 2021-08-24 RX ADMIN — FAMOTIDINE 20 MG: 10 INJECTION INTRAVENOUS at 09:00

## 2021-08-24 RX ADMIN — DEXAMETHASONE SODIUM PHOSPHATE 6 MG: 4 INJECTION, SOLUTION INTRAMUSCULAR; INTRAVENOUS at 19:00

## 2021-08-24 RX ADMIN — INSULIN LISPRO 2 UNITS: 100 INJECTION, SOLUTION INTRAVENOUS; SUBCUTANEOUS at 13:12

## 2021-08-24 RX ADMIN — Medication 10 ML: at 11:11

## 2021-08-24 RX ADMIN — Medication 2000 UNITS: at 09:00

## 2021-08-24 ASSESSMENT — ENCOUNTER SYMPTOMS
COUGH: 1
SHORTNESS OF BREATH: 1
GASTROINTESTINAL NEGATIVE: 1

## 2021-08-24 NOTE — PROGRESS NOTES
Infectious Disease     Patient Name: Harriet Yo  Date: 8/24/2021  YOB: 1932  Medical Record Number: [id number]      COVID-19 pneumonia        The EXAMINATION: CT scan of the chest with contrast (pulmonary embolism protocol)       INDICATION: Chest pain and shortness of breath.       COMPARISON: None       TECHNIQUE: Helical CT was performed through the chest utilizing 100 cc of Isovue-300 intravenous contrast.  Images were obtained with bolus tracking in order to opacify the pulmonary arteries.  Both MIP and 3D volume rendered reconstructions were    performed.       FINDINGS: There are no findings central, proximal proximal-segmental pulmonary emboli.       There is mild to moderate emphysematous disease. There is areas of patchy multifocal to coalescent airspace disease throughout the lung parenchyma which has shown interval progression, worsening as compared to the prior examination. No pleural effusions. No pneumothoraces.       There is nonspecific subcentimeter periaortic adenopathy. There is pretracheal adenopathy. There is bilateral perihilar adenopathy. No significant subcarinal adenopathy.       In the field-of-view the abdomen is a moderate to large hiatal hernia.       There is multilevel degenerative changes with bridging osteophytes of the thoracic spine superpose upon a mild dorsal kyphosis.       Nodule left lobe of thyroid measuring approximately 9 mm.               Impression   1. NO FINDINGS CENTRAL, PROXIMAL OR PROXIMAL- SEGMENTAL PULMONARY EMBOLI. .     2. THERE IS MILD EMPHYSEMATOUS DISEASE AND CHRONIC INTERSTITIAL CHANGES WITH WORSENING AREAS OF PATCHY MULTIFOCAL to COALESCENT AIRSPACE DISEASE WITHIN THE LUNG PARENCHYMA. COMMONLY REPORT IMAGING FEATURES OF (COVID-19) PNEUMONIA ARE PRESENT. OTHER    PROCESSES SUCH AS INFLUENZA, PNEUMONIA AND ORGANIZING PNEUMONIA AS CAN BE SEEN WITH DRUG TOXICITY AND CONNECTIVE TISSUE DISEASE CAN CAUSE A SIMILAR IMAGING PATTERN.     3. MODERATE TO LARGE HIATAL HERNIA. 4. NODULE LEFT LOBE OF THYROID. CORRELATE CLINICALLY FOLLOW-UP       Other findings detailed above       EXAMINATION: Portable AP ERECT view of the chest.       CLINICAL HISTORY:  sob, hypoxia , Positive Covid-19 test.        DATE: 8/18/2021 2:01 PM       COMPARISONS: 8/13/2021       FINDINGS: The heart is mildly enlarged, unchanged. There are atherosclerotic calcifications in the aortic arch. There are peripheral groundglass infiltrates in the mid lungs in lung bases. Infiltrates mildly increased since prior exam. Findings may be    secondary to viral pneumonia. Patient also be secondary to fluid overload or congestive heart failure. No pleural effusion or pneumothorax.  The bony thorax is unremarkable.           Impression   PERIPHERAL GROUNDGLASS INFILTRATES IN BOTH LUNGS, PRIMARILY IN THE MIDLUNGS AND LUNG BASES. MILD INCREASING INFILTRATES. DIFFERENTIAL DIAGNOSIS INCLUDES FLUID OVERLOAD, CHF OR VIRAL PNEUMONIA. 95% saturated 50 L a minute 55%        Review of Systems   Constitutional: Negative for chills, diaphoresis, fatigue and fever. Respiratory: Positive for cough and shortness of breath. Cardiovascular: Negative. Gastrointestinal: Negative. Physical Exam  Constitutional:       Appearance: She is ill-appearing. Cardiovascular:      Heart sounds: Normal heart sounds. No murmur heard. Pulmonary:      Effort: Pulmonary effort is normal. No respiratory distress. Breath sounds: Normal breath sounds. No stridor. No wheezing, rhonchi or rales. Abdominal:      General: Abdomen is flat. Bowel sounds are normal. There is no distension. Palpations: There is no mass. Tenderness: There is no abdominal tenderness. There is no guarding. Blood pressure (!) 144/79, pulse 79, temperature 98.6 °F (37 °C), temperature source Oral, resp. rate 20, height 5' 3\" (1.6 m), weight 135 lb (61.2 kg), SpO2 95 %, not currently breastfeeding.       .   Lab Results   Component Value Date    WBC 9.5 08/24/2021    HGB 13.0 08/24/2021    HCT 37.0 08/24/2021    MCV 88.4 08/24/2021     (H) 08/24/2021     Lab Results   Component Value Date     08/24/2021    K 3.8 08/24/2021    CL 99 08/24/2021    CO2 29 08/24/2021    BUN 49 08/24/2021    CREATININE 0.64 08/24/2021    GLUCOSE 253 08/24/2021    CALCIUM 9.3 08/24/2021              PLAN:    COVID-19 pneumonia   dexamethasone remdesivir O2 coagulation

## 2021-08-24 NOTE — PROGRESS NOTES
Shift assessment completed. Pt is alert and oriented x3, some forgetfulness present at times. Pt is able to make needs known but has some hearing and visual deficits and needs set up help or cues for assistance. Pt LS diminished, non productive dry cough present. Pt remains on high flow o2, skin intact in area. Pt is compliant with use of O2 and lying prone position. HR and rhythm regular. Remains on tele. Edema non pitting to BLE, skin is pale warm and dry. Pt is afebrile. Abd is soft and non distended, active BS x4. Appetite is fair, pt needs cues and set up to assist with eating. Continue to encourage PO fluids. Urine output dark yellow, odorous. No BM noted this shift. Pt denies pain at this time. Continues on steroid. Continues on droplet plus precautions for Covid. Call light within reach.

## 2021-08-24 NOTE — PROGRESS NOTES
INPATIENT PROGRESS NOTES    PATIENT NAME: Lucia White  MRN: [id number]  SERVICE DATE:  August 24, 2021   SERVICE TIME:  6:05 PM      PRIMARY SERVICE: Pulmonary Disease    CHIEF COMPLAIN: COVID-19 pneumonia and respiratory failure      INTERVAL HPI: Patient seen and examined at bedside, Interval Notes, orders reviewed. Nursing notes noted  She is on high flow oxygen 50% and 50 L O2. O2 saturation is 94%. . Still mild short of breath. No chest pain. No nausea vomiting diarrhea. No fever or chills. Overall feeling better. OBJECTIVE    Body mass index is 23.91 kg/m². PHYSICAL EXAM:  Vitals:  BP (!) 144/79   Pulse 79   Temp 98.6 °F (37 °C) (Oral)   Resp 20   Ht 5' 3\" (1.6 m)   Wt 135 lb (61.2 kg)   SpO2 94%   BMI 23.91 kg/m²   General: Alert, awake . comfortable in bed, No distress. Head: Atraumatic , Normocephalic   Eyes: PERRL. No sclera icterus. No conjunctival injection. No discharge   ENT: No nasal  discharge. Pharynx clear. Neck:  Trachea midline. No thyromegaly, no JVD, No cervical adenopathy. Chest : Bilaterally symmetrical ,Normal effort,  No accessory muscle use  Lung : . Fair BS bilateral, decreased BS at bases. Bibasilar Rales. No wheezing. No rhonchi. Heart[de-identified] Normal  rate. Regular rhythm. No mumur ,  Rub or gallop  ABD: Non-tender. Non-distended. No masses. No organmegaly. Normal bowel sounds. No hernia.   Ext : No Pitting both leg , No Cyanosis No clubbing  Neuro: no focal weakness          DATA:   Recent Labs     08/23/21 0518 08/24/21 0527   WBC 8.2 9.5   HGB 12.8 13.0   HCT 36.6* 37.0   MCV 88.7 88.4   * 466*     Recent Labs     08/23/21 0518 08/23/21 0518 08/24/21 0527     --  138   K 3.7  --  3.8   CL 98  --  99   CO2 29  --  29   BUN 53*  --  49*   CREATININE 0.71  --  0.64   GLUCOSE 255*  --  253*   CALCIUM 9.3  --  9.3   PROT 5.6*  --  5.9*   LABALBU 2.8*  --  2.9*   BILITOT 0.6  --  0.6   ALKPHOS 82  --  87   AST 18  --  18   ALT 22   < > 23   LABGLOM >60.0 < > >60.0   GFRAA >60.0   < > >60.0   GLOB 2.8   < > 3.0    < > = values in this interval not displayed. MV Settings:     FiO2 : 50 %    Recent Labs     08/21/21 2010   PHART 7.533*   WJJ4BBR 36   PO2ART 60*   KGD1WKO 30.3*   BEART 8*   [id number] 93*       O2 Device: Heated high flow cannula  O2 Flow Rate (L/min): 50 L/min    ADULT DIET; Regular; Safety Tray; Safety Tray (Disposables)  Adult Oral Nutrition Supplement; Standard High Calorie/High Protein Oral Supplement     MEDICATIONS during current hospitalization:    Continuous Infusions:   dextrose      sodium chloride         Scheduled Meds:   insulin glargine  0.15 Units/kg Subcutaneous Nightly    insulin lispro  0.1 Units/kg Subcutaneous TID WC    insulin lispro  0-6 Units Subcutaneous TID WC    insulin lispro  0-3 Units Subcutaneous Nightly    albuterol sulfate HFA  2 puff Inhalation TID    sodium chloride flush  5-40 mL IntraVENous 2 times per day    dexamethasone  6 mg IntraVENous Q24H    Vitamin D  2,000 Units Oral Daily    enoxaparin  30 mg Subcutaneous BID    famotidine (PEPCID) injection  20 mg IntraVENous Daily    aspirin  81 mg Oral BID    metoprolol succinate  50 mg Oral Daily    pravastatin  40 mg Oral Nightly       PRN Meds:glucose, dextrose, glucagon (rDNA), dextrose, albuterol sulfate HFA, sodium chloride flush, sodium chloride, ondansetron **OR** ondansetron, polyethylene glycol, acetaminophen **OR** acetaminophen, guaiFENesin-dextromethorphan    Radiology  CTA Chest W WO  (PE study)    Result Date: 8/18/2021  The EXAMINATION: CT scan of the chest with contrast (pulmonary embolism protocol) INDICATION: Chest pain and shortness of breath. COMPARISON: None TECHNIQUE: Helical CT was performed through the chest utilizing 100 cc of Isovue-300 intravenous contrast.  Images were obtained with bolus tracking in order to opacify the pulmonary arteries. Both MIP and 3D volume rendered reconstructions were performed.  FINDINGS: There INCREASING BIBASILAR PATCHY MULTIFOCAL TO COALESCENT AIRSPACE DISEASE PARTICULARLY WITHIN THE BASES SUPERIMPOSED UPON RADIOGRAPHIC FINDINGS OF COPD. CORRELATE CLINICALLY    XR CHEST PORTABLE    Result Date: 8/18/2021  EXAMINATION: Portable AP ERECT view of the chest. CLINICAL HISTORY:  sob, hypoxia , Positive Covid-19 test. DATE: 8/18/2021 2:01 PM COMPARISONS: 8/13/2021 FINDINGS: The heart is mildly enlarged, unchanged. There are atherosclerotic calcifications in the aortic arch. There are peripheral groundglass infiltrates in the mid lungs in lung bases. Infiltrates mildly increased since prior exam. Findings may be secondary to viral pneumonia. Patient also be secondary to fluid overload or congestive heart failure. No pleural effusion or pneumothorax. The bony thorax is unremarkable. PERIPHERAL GROUNDGLASS INFILTRATES IN BOTH LUNGS, PRIMARILY IN THE MIDLUNGS AND LUNG BASES. MILD INCREASING INFILTRATES. DIFFERENTIAL DIAGNOSIS INCLUDES FLUID OVERLOAD, CHF OR VIRAL PNEUMONIA. XR CHEST PORTABLE    Result Date: 8/13/2021  Exam: XR CHEST PORTABLE History:  cough Technique: AP portable view of the chest obtained. Comparison: Chest x-ray from March 13, 2018 Chest x-ray portable Findings: The cardiac silhouette is enlarged. There are mild increased interstitial markings of both lungs which may reflect fluid overload versus congestive heart failure. There are no pleural effusions. . Bones of the thorax appear intact. Increased pulmonary markings indicate fluid overload, CHF, or viral pneumonia. IMPRESSION AND SUGGESTION:  1. Acute hypoxic respiratory failure on high flow O2  2. COVID-19 infection with pneumonia  3. IDDM  4. Hypertension  5. Hyperlipidemia    Patient is still on high flow oxygen. Now she is on 50 % FiO2 on 50 L. O2 saturation is 92%. Try nasal cannula once FiO2 45% or below. Finished remdesivir. Continue Decadron.  Continue bronchodilator therapy. ID is following. Slowly improving  I spent more than 35 min with this patient's care , greater the 50% of this time was spent in counseling and/or coordinating of care. NOTE: This report was transcribed using voice recognition software. Every effort was made to ensure accuracy; however, inadvertent computerized transcription errors may be present.       Electronically signed by Jackelin Flores MD, [facility] on 8/24/2021 at 6:05 PM

## 2021-08-24 NOTE — PROGRESS NOTES
Department of Internal Medicine  General Internal Medicine  Attending Progress Note      SUBJECTIVE:  Pt seen and examined. Continues to be on high flow, but states no sob. Minimal cough. Daughter called and updated on plan of care.      OBJECTIVE      Medications    Current Facility-Administered Medications: insulin glargine (LANTUS) injection vial 9 Units, 0.15 Units/kg, Subcutaneous, Nightly  insulin lispro (HUMALOG) injection vial 6 Units, 0.1 Units/kg, Subcutaneous, TID WC  glucose (GLUTOSE) 40 % oral gel 15 g, 15 g, Oral, PRN  dextrose 50 % IV solution, 12.5 g, IntraVENous, PRN  glucagon (rDNA) injection 1 mg, 1 mg, Intramuscular, PRN  dextrose 5 % solution, 100 mL/hr, IntraVENous, PRN  insulin lispro (HUMALOG) injection vial 0-6 Units, 0-6 Units, Subcutaneous, TID WC  insulin lispro (HUMALOG) injection vial 0-3 Units, 0-3 Units, Subcutaneous, Nightly  albuterol sulfate  (90 Base) MCG/ACT inhaler 2 puff, 2 puff, Inhalation, TID  albuterol sulfate  (90 Base) MCG/ACT inhaler 2 puff, 2 puff, Inhalation, Q2H PRN  sodium chloride flush 0.9 % injection 5-40 mL, 5-40 mL, IntraVENous, 2 times per day  sodium chloride flush 0.9 % injection 5-40 mL, 5-40 mL, IntraVENous, PRN  0.9 % sodium chloride infusion, 25 mL, IntraVENous, PRN  ondansetron (ZOFRAN-ODT) disintegrating tablet 4 mg, 4 mg, Oral, Q8H PRN **OR** ondansetron (ZOFRAN) injection 4 mg, 4 mg, IntraVENous, Q6H PRN  polyethylene glycol (GLYCOLAX) packet 17 g, 17 g, Oral, Daily PRN  acetaminophen (TYLENOL) tablet 650 mg, 650 mg, Oral, Q6H PRN **OR** acetaminophen (TYLENOL) suppository 650 mg, 650 mg, Rectal, Q6H PRN  dexamethasone (DECADRON) injection 6 mg, 6 mg, IntraVENous, Q24H  guaiFENesin-dextromethorphan (ROBITUSSIN DM) 100-10 MG/5ML syrup 5 mL, 5 mL, Oral, Q4H PRN  Vitamin D (CHOLECALCIFEROL) tablet 2,000 Units, 2,000 Units, Oral, Daily  enoxaparin (LOVENOX) injection 30 mg, 30 mg, Subcutaneous, BID  famotidine (PEPCID) injection 20 mg, 20 mg, IntraVENous, Daily  aspirin EC tablet 81 mg, 81 mg, Oral, BID  metoprolol succinate (TOPROL XL) extended release tablet 50 mg, 50 mg, Oral, Daily  pravastatin (PRAVACHOL) tablet 40 mg, 40 mg, Oral, Nightly  Physical    VITALS:  BP (!) 144/79   Pulse 79   Temp 98.6 °F (37 °C) (Oral)   Resp 20   Ht 5' 3\" (1.6 m)   Wt 135 lb (61.2 kg)   SpO2 95%   BMI 23.91 kg/m²   Constitutional: Awake and alert in no acute distress. Lying in bed comfortably  Head: Normocephalic, atraumatic  Eyes: EOMI, PERRLA  ENT: moist mucous membranes, high flow nasal cannula  Neck: neck supple, trachea midline  Lungs: Good inspiratory effort, no wheeze, no rhonchi, no rales  Heart: RRR, normal S1 and S2  GI: Soft, non-distended, non tender, no guarding, no rebound, +BS  MSK: no edema noted  Skin: warm, dry  Psych: appropriate affect     Data    CBC:   Lab Results   Component Value Date    WBC 9.5 08/24/2021    RBC 4.18 08/24/2021    HGB 13.0 08/24/2021    HCT 37.0 08/24/2021    MCV 88.4 08/24/2021    MCH 31.2 08/24/2021    MCHC 35.3 08/24/2021    RDW 12.6 08/24/2021     08/24/2021     CMP:    Lab Results   Component Value Date     08/24/2021    K 3.8 08/24/2021    CL 99 08/24/2021    CO2 29 08/24/2021    BUN 49 08/24/2021    CREATININE 0.64 08/24/2021    GFRAA >60.0 08/24/2021    LABGLOM >60.0 08/24/2021    GLUCOSE 253 08/24/2021    PROT 5.9 08/24/2021    LABALBU 2.9 08/24/2021    CALCIUM 9.3 08/24/2021    BILITOT 0.6 08/24/2021    ALKPHOS 87 08/24/2021    AST 18 08/24/2021    ALT 23 08/24/2021       ASSESSMENT AND PLAN      # Acute hypoxic respiratory failure 2/2 COVID-19 pneumonia  - continues to require high flow nasal cannula - wean as tolerated  - last dose of Remdesivir 8/23. Continuing decadron  - ID consulted, pulm consulted    # IDDM  - continue to monitor while on steroids  - lantus, RASTA    # HTN/HLD  - cont home meds    DVT: lovenox per covid protocol    Disposition: Pt feels well. Still on high flow.  Wean as tolerated. Continuing decadron. ID and pulm consulted. If not able to be weaned in next 1-2 days, will need to have conversation about LTAC placement. Daughter updated on plan of care.       Linda Morris DO  Internal Medicine

## 2021-08-25 LAB
ALBUMIN SERPL-MCNC: 2.9 G/DL (ref 3.5–4.6)
ALP BLD-CCNC: 81 U/L (ref 40–130)
ALT SERPL-CCNC: 21 U/L (ref 0–33)
ANION GAP SERPL CALCULATED.3IONS-SCNC: 11 MEQ/L (ref 9–15)
AST SERPL-CCNC: 17 U/L (ref 0–35)
BASOPHILS ABSOLUTE: 0 K/UL (ref 0–0.2)
BASOPHILS RELATIVE PERCENT: 0.2 %
BILIRUB SERPL-MCNC: 0.6 MG/DL (ref 0.2–0.7)
BUN BLDV-MCNC: 47 MG/DL (ref 8–23)
CALCIUM SERPL-MCNC: 9.3 MG/DL (ref 8.5–9.9)
CHLORIDE BLD-SCNC: 98 MEQ/L (ref 95–107)
CO2: 28 MEQ/L (ref 20–31)
CREAT SERPL-MCNC: 0.66 MG/DL (ref 0.5–0.9)
EOSINOPHILS ABSOLUTE: 0 K/UL (ref 0–0.7)
EOSINOPHILS RELATIVE PERCENT: 0.1 %
GFR AFRICAN AMERICAN: >60
GFR NON-AFRICAN AMERICAN: >60
GLOBULIN: 2.9 G/DL (ref 2.3–3.5)
GLUCOSE BLD-MCNC: 116 MG/DL (ref 60–115)
GLUCOSE BLD-MCNC: 119 MG/DL (ref 60–115)
GLUCOSE BLD-MCNC: 201 MG/DL (ref 60–115)
GLUCOSE BLD-MCNC: 241 MG/DL (ref 70–99)
GLUCOSE BLD-MCNC: 93 MG/DL (ref 60–115)
HCT VFR BLD CALC: 37.1 % (ref 37–47)
HEMOGLOBIN: 12.8 G/DL (ref 12–16)
LYMPHOCYTES ABSOLUTE: 0.3 K/UL (ref 1–4.8)
LYMPHOCYTES RELATIVE PERCENT: 3.7 %
MCH RBC QN AUTO: 30.6 PG (ref 27–31.3)
MCHC RBC AUTO-ENTMCNC: 34.4 % (ref 33–37)
MCV RBC AUTO: 88.9 FL (ref 82–100)
MONOCYTES ABSOLUTE: 0.3 K/UL (ref 0.2–0.8)
MONOCYTES RELATIVE PERCENT: 3.5 %
NEUTROPHILS ABSOLUTE: 8.3 K/UL (ref 1.4–6.5)
NEUTROPHILS RELATIVE PERCENT: 92.5 %
PDW BLD-RTO: 12.6 % (ref 11.5–14.5)
PERFORMED ON: ABNORMAL
PERFORMED ON: NORMAL
PLATELET # BLD: 400 K/UL (ref 130–400)
POTASSIUM REFLEX MAGNESIUM: 3.9 MEQ/L (ref 3.4–4.9)
RBC # BLD: 4.17 M/UL (ref 4.2–5.4)
SODIUM BLD-SCNC: 137 MEQ/L (ref 135–144)
TOTAL PROTEIN: 5.8 G/DL (ref 6.3–8)
WBC # BLD: 8.9 K/UL (ref 4.8–10.8)

## 2021-08-25 PROCEDURE — 2700000000 HC OXYGEN THERAPY PER DAY

## 2021-08-25 PROCEDURE — 99232 SBSQ HOSP IP/OBS MODERATE 35: CPT | Performed by: INTERNAL MEDICINE

## 2021-08-25 PROCEDURE — 2060000000 HC ICU INTERMEDIATE R&B

## 2021-08-25 PROCEDURE — 85025 COMPLETE CBC W/AUTO DIFF WBC: CPT

## 2021-08-25 PROCEDURE — 94761 N-INVAS EAR/PLS OXIMETRY MLT: CPT

## 2021-08-25 PROCEDURE — 6370000000 HC RX 637 (ALT 250 FOR IP): Performed by: INTERNAL MEDICINE

## 2021-08-25 PROCEDURE — 6360000002 HC RX W HCPCS: Performed by: INTERNAL MEDICINE

## 2021-08-25 PROCEDURE — 2580000003 HC RX 258: Performed by: INTERNAL MEDICINE

## 2021-08-25 PROCEDURE — 2500000003 HC RX 250 WO HCPCS: Performed by: INTERNAL MEDICINE

## 2021-08-25 PROCEDURE — 80053 COMPREHEN METABOLIC PANEL: CPT

## 2021-08-25 PROCEDURE — 94640 AIRWAY INHALATION TREATMENT: CPT

## 2021-08-25 PROCEDURE — 36415 COLL VENOUS BLD VENIPUNCTURE: CPT

## 2021-08-25 RX ADMIN — ENOXAPARIN SODIUM 30 MG: 30 INJECTION SUBCUTANEOUS at 09:53

## 2021-08-25 RX ADMIN — FAMOTIDINE 20 MG: 10 INJECTION INTRAVENOUS at 09:53

## 2021-08-25 RX ADMIN — METOPROLOL SUCCINATE 50 MG: 50 TABLET, FILM COATED, EXTENDED RELEASE ORAL at 09:53

## 2021-08-25 RX ADMIN — Medication 2000 UNITS: at 09:53

## 2021-08-25 RX ADMIN — ASPIRIN 81 MG: 81 TABLET, COATED ORAL at 20:58

## 2021-08-25 RX ADMIN — Medication 10 ML: at 21:00

## 2021-08-25 RX ADMIN — Medication 10 ML: at 09:53

## 2021-08-25 RX ADMIN — INSULIN GLARGINE 9 UNITS: 100 INJECTION, SOLUTION SUBCUTANEOUS at 20:59

## 2021-08-25 RX ADMIN — ASPIRIN 81 MG: 81 TABLET, COATED ORAL at 09:53

## 2021-08-25 RX ADMIN — PRAVASTATIN SODIUM 40 MG: 40 TABLET ORAL at 20:58

## 2021-08-25 RX ADMIN — INSULIN LISPRO 6 UNITS: 100 INJECTION, SOLUTION INTRAVENOUS; SUBCUTANEOUS at 18:06

## 2021-08-25 RX ADMIN — INSULIN LISPRO 2 UNITS: 100 INJECTION, SOLUTION INTRAVENOUS; SUBCUTANEOUS at 09:58

## 2021-08-25 RX ADMIN — DEXAMETHASONE SODIUM PHOSPHATE 6 MG: 4 INJECTION, SOLUTION INTRAMUSCULAR; INTRAVENOUS at 18:05

## 2021-08-25 RX ADMIN — INSULIN LISPRO 6 UNITS: 100 INJECTION, SOLUTION INTRAVENOUS; SUBCUTANEOUS at 09:58

## 2021-08-25 RX ADMIN — ALBUTEROL SULFATE 2 PUFF: 90 AEROSOL, METERED RESPIRATORY (INHALATION) at 14:16

## 2021-08-25 RX ADMIN — ALBUTEROL SULFATE 2 PUFF: 90 AEROSOL, METERED RESPIRATORY (INHALATION) at 20:39

## 2021-08-25 RX ADMIN — ENOXAPARIN SODIUM 30 MG: 30 INJECTION SUBCUTANEOUS at 20:58

## 2021-08-25 ASSESSMENT — ENCOUNTER SYMPTOMS
SHORTNESS OF BREATH: 1
COUGH: 1
GASTROINTESTINAL NEGATIVE: 1

## 2021-08-25 NOTE — PROGRESS NOTES
Comprehensive Nutrition Assessment    Type and Reason for Visit:  Initial, RD Nutrition Re-Screen/LOS    Nutrition Recommendations/Plan: Modify Current Diet, Modify Oral Nutrition Supplement    Nutrition Assessment:  Pt is at nutritiona risk due to decreased appetite intake pta and currently. To modify diet/ change to  diabetic supplement tid, with noted elevated blood sugar levels. Malnutrition Assessment:  Malnutrition Status: At risk for malnutrition (Comment)    Context:  Acute Illness     Findings of the 6 clinical characteristics of malnutrition:  Energy Intake:  7 - 50% or less of estimated energy requirements for 5 or more days  Weight Loss:  Unable to assess     Body Fat Loss:  Unable to assess (NFPA deferred d/t covid precautions)     Muscle Mass Loss:  Unable to assess    Fluid Accumulation:  Unable to assess     Strength:  Not Performed    Estimated Daily Nutrient Needs:  Energy (kcal):  8267-8399 (kg x 25-27); Weight Used for Energy Requirements:  Current     Protein (g):  73-79 (kg x 1.2-1.3); Weight Used for Protein Requirements:  Current        Fluid (ml/day):  ~1600; Method Used for Fluid Requirements:  1 ml/kcal      Nutrition Related Findings:  PMH-COPD, htn, hld, +COVID 8/13. Pt reports decreased appetite/intake since covid dx. Gluc- 188-379 x last 3 days. Pt recieving decadron, insulin. Pt agreeable to change to diabetic supplement. Wounds:  None       Current Nutrition Therapies:    ADULT DIET;  Regular; Safety Tray; Safety Tray (Disposables); (<50%)  Adult Oral Nutrition Supplement; High Calorie supplement qid    Anthropometric Measures:  · Height: 5' 3\" (160 cm)  · Current Body Weight: 135 lb (61.2 kg) (8/19)   · Admission Body Weight: 137 lb (62.1 kg) (stated)    · Usual Body Weight:  (n/a)     · Ideal Body Weight: 115 lbs; % Ideal Body Weight   >100%  · BMI: 23.9  · BMI Categories: Normal Weight (BMI 22.0 to 24.9) age over 72       Nutrition Diagnosis:   · Inadequate oral

## 2021-08-25 NOTE — PLAN OF CARE
Nutrition Problem #1: Inadequate oral intake  Intervention: Food and/or Nutrient Delivery: Modify Current Diet, Modify Oral Nutrition Supplement  Nutritional Goals: Intake >50% of meals/supplement. stable weight. Gluc <160.

## 2021-08-25 NOTE — PROGRESS NOTES
INPATIENT PROGRESS NOTES    PATIENT NAME: Kenny Eli  MRN: [id number]  SERVICE DATE:  August 25, 2021   SERVICE TIME:  3:47 PM      PRIMARY SERVICE: Pulmonary Disease    CHIEF COMPLAIN: COVID-19 pneumonia and respiratory failure      INTERVAL HPI: Patient seen and examined at bedside, Interval Notes, orders reviewed. Nursing notes noted  She is on high flow oxygen 40% and 40 L O2. O2 saturation is 93%. . She said she is feeling tired and minimal short of breath otherwise feeling much better. .  No cough. No chest pain. No nausea vomiting diarrhea. No fever or chills. OBJECTIVE    Body mass index is 23.91 kg/m². PHYSICAL EXAM:  Vitals:  /70   Pulse 74   Temp 98.6 °F (37 °C) (Oral)   Resp 16   Ht 5' 3\" (1.6 m)   Wt 135 lb (61.2 kg)   SpO2 93%   BMI 23.91 kg/m²   General: Alert, awake . comfortable in bed, No distress. Head: Atraumatic , Normocephalic   Eyes: PERRL. No sclera icterus. No conjunctival injection. No discharge   ENT: No nasal  discharge. Pharynx clear. Neck:  Trachea midline. No thyromegaly, no JVD, No cervical adenopathy. Chest : Bilaterally symmetrical ,Normal effort,  No accessory muscle use  Lung : . Fair BS bilateral, decreased BS at bases. No rales. No wheezing. No rhonchi. Heart[de-identified] Normal  rate. Regular rhythm. No mumur ,  Rub or gallop  ABD: Non-tender. Non-distended. No masses. No organmegaly. Normal bowel sounds. No hernia.   Ext : No Pitting both leg , No Cyanosis No clubbing  Neuro: no focal weakness          DATA:   Recent Labs     08/24/21 0527 08/25/21 0547   WBC 9.5 8.9   HGB 13.0 12.8   HCT 37.0 37.1   MCV 88.4 88.9   * 400     Recent Labs     08/24/21 0527 08/24/21 0527 08/25/21 0547     --  137   K 3.8  --  3.9   CL 99  --  98   CO2 29  --  28   BUN 49*  --  47*   CREATININE 0.64  --  0.66   GLUCOSE 253*  --  241*   CALCIUM 9.3  --  9.3   PROT 5.9*  --  5.8*   LABALBU 2.9*  --  2.9*   BILITOT 0.6  --  0.6   ALKPHOS 87  --  81   AST 18  --  17 ALT 23   < > 21   LABGLOM >60.0   < > >60.0   GFRAA >60.0   < > >60.0   GLOB 3.0   < > 2.9    < > = values in this interval not displayed. MV Settings:     FiO2 : 40 %    No results for input(s): PHART, EAC9WBV, PO2ART, TPI6NFT, BEART, Y7YGERDM in the last 72 hours. O2 Device: Heated high flow cannula  O2 Flow Rate (L/min): 35 L/min    Adult Oral Nutrition Supplement; Diabetic Oral Supplement  ADULT DIET; Regular; 4 carb choices (60 gm/meal); Safety Tray; Safety Tray (Disposables)     MEDICATIONS during current hospitalization:    Continuous Infusions:   dextrose      sodium chloride         Scheduled Meds:   insulin glargine  0.15 Units/kg Subcutaneous Nightly    insulin lispro  0.1 Units/kg Subcutaneous TID WC    insulin lispro  0-6 Units Subcutaneous TID WC    insulin lispro  0-3 Units Subcutaneous Nightly    albuterol sulfate HFA  2 puff Inhalation TID    sodium chloride flush  5-40 mL IntraVENous 2 times per day    dexamethasone  6 mg IntraVENous Q24H    Vitamin D  2,000 Units Oral Daily    enoxaparin  30 mg Subcutaneous BID    famotidine (PEPCID) injection  20 mg IntraVENous Daily    aspirin  81 mg Oral BID    metoprolol succinate  50 mg Oral Daily    pravastatin  40 mg Oral Nightly       PRN Meds:glucose, dextrose, glucagon (rDNA), dextrose, albuterol sulfate HFA, sodium chloride flush, sodium chloride, ondansetron **OR** ondansetron, polyethylene glycol, acetaminophen **OR** acetaminophen, guaiFENesin-dextromethorphan    Radiology  CTA Chest W WO  (PE study)    Result Date: 8/18/2021  The EXAMINATION: CT scan of the chest with contrast (pulmonary embolism protocol) INDICATION: Chest pain and shortness of breath. COMPARISON: None TECHNIQUE: Helical CT was performed through the chest utilizing 100 cc of Isovue-300 intravenous contrast.  Images were obtained with bolus tracking in order to opacify the pulmonary arteries. Both MIP and 3D volume rendered reconstructions were performed. FINDINGS: There are no findings central, proximal proximal-segmental pulmonary emboli. There is mild to moderate emphysematous disease. There is areas of patchy multifocal to coalescent airspace disease throughout the lung parenchyma which has shown interval progression, worsening as compared to the prior examination. No pleural effusions. No pneumothoraces. There is nonspecific subcentimeter periaortic adenopathy. There is pretracheal adenopathy. There is bilateral perihilar adenopathy. No significant subcarinal adenopathy. In the field-of-view the abdomen is a moderate to large hiatal hernia. There is multilevel degenerative changes with bridging osteophytes of the thoracic spine superpose upon a mild dorsal kyphosis. Nodule left lobe of thyroid measuring approximately 9 mm. 1.NO FINDINGS CENTRAL, PROXIMAL OR PROXIMAL- SEGMENTAL PULMONARY EMBOLI. .  2. THERE IS MILD EMPHYSEMATOUS DISEASE AND CHRONIC INTERSTITIAL CHANGES WITH WORSENING AREAS OF PATCHY MULTIFOCAL to COALESCENT AIRSPACE DISEASE WITHIN THE LUNG PARENCHYMA. COMMONLY REPORT IMAGING FEATURES OF (COVID-19) PNEUMONIA ARE PRESENT. OTHER PROCESSES SUCH AS INFLUENZA, PNEUMONIA AND ORGANIZING PNEUMONIA AS CAN BE SEEN WITH DRUG TOXICITY AND CONNECTIVE TISSUE DISEASE CAN CAUSE A SIMILAR IMAGING PATTERN. 3. MODERATE TO LARGE HIATAL HERNIA. 4. NODULE LEFT LOBE OF THYROID. CORRELATE CLINICALLY FOLLOW-UP Other findings detailed above All CT scans at this facility use dose modulation, iterative reconstruction, and/or weight based dosing when appropriate to reduce radiation dose to as low as reasonably achievable. CTA Chest W WO  (PE study)    Result Date: 8/13/2021  EXAM:CTA CHEST W WO CONTRAST DATE8/13/2021 1:07 PM: REASON FOR EXAM:Acute severe anterior chest wall pain.   covid hypoxia COMPARISON: none Technique: Helical CT was performed through the chest following the IV administration of100  cc of nonionic contrast. 3-D MIP reconstructions were performed on an independent workstation in the coronal plane with thick slab technique utilized in the interpretation. 3-D maximum intensity projection performed. All CT scans at this facility use dose modulation, iterative reconstruction, and/or weight based dosing when appropriate to reduce radiation dose to as low as reasonably achievable. CHEST CTA  FINDINGS: There is an 11 mm inhomogeneous nodule in the left lobe of the thyroid gland. Mediastinum: There are mild hilar and mediastinal lymph nodes, likely related to reactive nodes. The chest wall and lower neck are normal Heart: The heart is enlarged. There is no pericardial effusion. Vascular structures: There is no evidence for thoracic aortic aneurysm or dissection. No evidence of traumatic aortic injury. There are no persistent filling defects within the pulmonary arteries. Lungs: There are patchy groundglass alveolar alveolar opacities in both lungs worrisome for early infiltrates possible viral pneumonia, COVID-19. Pleura: No pleural effusion or thickening. Upper abdomen: There is a 3.5 x 5.4 cm hiatal hernia containing the proximal stomach. The visualized portions of the upper abdomen are unremarkable. Bones/axillae/soft tissues: Osseous structures and chest wall are normal.     Negative CTA of the chest. No evidence of thoracic aortic dissection or aneurysm. The study is negative for pulmonary emboli. There are patchy bilateral alveolar opacities worrisome for early infiltrates possible viral pneumonia. Differential diagnosis includes COVID-19. XR CHEST PORTABLE    Result Date: 8/21/2021  EXAMINATION: CHEST PORTABLE VIEW  CLINICAL HISTORY: Worsening hypoxia COMPARISONS: August 18, 2021 1412 hours  FINDINGS: Single  views of the chest is submitted. The cardiac silhouette is enlarged. . Pulmonary vascular attenuated. Right sided trachea. Increasing bibasilar patchy multifocal to coalescent airspace disease particularly within the bases. No Pneumothoraces. INCREASING BIBASILAR PATCHY MULTIFOCAL TO COALESCENT AIRSPACE DISEASE PARTICULARLY WITHIN THE BASES SUPERIMPOSED UPON RADIOGRAPHIC FINDINGS OF COPD. CORRELATE CLINICALLY    XR CHEST PORTABLE    Result Date: 8/18/2021  EXAMINATION: Portable AP ERECT view of the chest. CLINICAL HISTORY:  sob, hypoxia , Positive Covid-19 test. DATE: 8/18/2021 2:01 PM COMPARISONS: 8/13/2021 FINDINGS: The heart is mildly enlarged, unchanged. There are atherosclerotic calcifications in the aortic arch. There are peripheral groundglass infiltrates in the mid lungs in lung bases. Infiltrates mildly increased since prior exam. Findings may be secondary to viral pneumonia. Patient also be secondary to fluid overload or congestive heart failure. No pleural effusion or pneumothorax. The bony thorax is unremarkable. PERIPHERAL GROUNDGLASS INFILTRATES IN BOTH LUNGS, PRIMARILY IN THE MIDLUNGS AND LUNG BASES. MILD INCREASING INFILTRATES. DIFFERENTIAL DIAGNOSIS INCLUDES FLUID OVERLOAD, CHF OR VIRAL PNEUMONIA. XR CHEST PORTABLE    Result Date: 8/13/2021  Exam: XR CHEST PORTABLE History:  cough Technique: AP portable view of the chest obtained. Comparison: Chest x-ray from March 13, 2018 Chest x-ray portable Findings: The cardiac silhouette is enlarged. There are mild increased interstitial markings of both lungs which may reflect fluid overload versus congestive heart failure. There are no pleural effusions. . Bones of the thorax appear intact. Increased pulmonary markings indicate fluid overload, CHF, or viral pneumonia. IMPRESSION AND SUGGESTION:  1. Acute hypoxic respiratory failure on high flow O2  2. COVID-19 infection with pneumonia  3. IDDM  4. Hypertension  5. Hyperlipidemia    Patient is still on high flow oxygen. Now she is on 40 % FiO2 on 40 L. O2 saturation is 93%. Try to get O2 via nasal cannula to see if tolerated. Finished remdesivir. Continue Decadron. Continue bronchodilator therapy. ID is following. Continue present treatment plan    NOTE: This report was transcribed using voice recognition software. Every effort was made to ensure accuracy; however, inadvertent computerized transcription errors may be present.       Electronically signed by Jesús Rodriguez MD, FCCP on 8/25/2021 at 3:47 PM

## 2021-08-25 NOTE — PROGRESS NOTES
2330: Report received from [facility].    0000: Pt is in bed, asleep. Avasys is in place for confusion. Continues to be needed.

## 2021-08-25 NOTE — PROGRESS NOTES
Department of Internal Medicine  General Internal Medicine  Attending Progress Note      SUBJECTIVE:  Pt seen and examined. Continues to be on high flow, but weaned down to 40L @ 40% today, which is markedly improved.  Pt continues to be without complaints     OBJECTIVE      Medications    Current Facility-Administered Medications: insulin glargine (LANTUS) injection vial 9 Units, 0.15 Units/kg, Subcutaneous, Nightly  insulin lispro (HUMALOG) injection vial 6 Units, 0.1 Units/kg, Subcutaneous, TID WC  glucose (GLUTOSE) 40 % oral gel 15 g, 15 g, Oral, PRN  dextrose 50 % IV solution, 12.5 g, IntraVENous, PRN  glucagon (rDNA) injection 1 mg, 1 mg, Intramuscular, PRN  dextrose 5 % solution, 100 mL/hr, IntraVENous, PRN  insulin lispro (HUMALOG) injection vial 0-6 Units, 0-6 Units, Subcutaneous, TID WC  insulin lispro (HUMALOG) injection vial 0-3 Units, 0-3 Units, Subcutaneous, Nightly  albuterol sulfate  (90 Base) MCG/ACT inhaler 2 puff, 2 puff, Inhalation, TID  albuterol sulfate  (90 Base) MCG/ACT inhaler 2 puff, 2 puff, Inhalation, Q2H PRN  sodium chloride flush 0.9 % injection 5-40 mL, 5-40 mL, IntraVENous, 2 times per day  sodium chloride flush 0.9 % injection 5-40 mL, 5-40 mL, IntraVENous, PRN  0.9 % sodium chloride infusion, 25 mL, IntraVENous, PRN  ondansetron (ZOFRAN-ODT) disintegrating tablet 4 mg, 4 mg, Oral, Q8H PRN **OR** ondansetron (ZOFRAN) injection 4 mg, 4 mg, IntraVENous, Q6H PRN  polyethylene glycol (GLYCOLAX) packet 17 g, 17 g, Oral, Daily PRN  acetaminophen (TYLENOL) tablet 650 mg, 650 mg, Oral, Q6H PRN **OR** acetaminophen (TYLENOL) suppository 650 mg, 650 mg, Rectal, Q6H PRN  dexamethasone (DECADRON) injection 6 mg, 6 mg, IntraVENous, Q24H  guaiFENesin-dextromethorphan (ROBITUSSIN DM) 100-10 MG/5ML syrup 5 mL, 5 mL, Oral, Q4H PRN  Vitamin D (CHOLECALCIFEROL) tablet 2,000 Units, 2,000 Units, Oral, Daily  enoxaparin (LOVENOX) injection 30 mg, 30 mg, Subcutaneous, BID  famotidine (PEPCID) injection 20 mg, 20 mg, IntraVENous, Daily  aspirin EC tablet 81 mg, 81 mg, Oral, BID  metoprolol succinate (TOPROL XL) extended release tablet 50 mg, 50 mg, Oral, Daily  pravastatin (PRAVACHOL) tablet 40 mg, 40 mg, Oral, Nightly  Physical    VITALS:  /70   Pulse 74   Temp 98.6 °F (37 °C) (Oral)   Resp 16   Ht 5' 3\" (1.6 m)   Wt 135 lb (61.2 kg)   SpO2 96%   BMI 23.91 kg/m²   Constitutional: Awake and alert in no acute distress. Lying in bed comfortably  Head: Normocephalic, atraumatic  Eyes: EOMI, PERRLA  ENT: moist mucous membranes, high flow nasal cannula  Neck: neck supple, trachea midline  Lungs: Good inspiratory effort, no wheeze, no rhonchi, no rales  Heart: RRR, normal S1 and S2  GI: Soft, non-distended, non tender, no guarding, no rebound, +BS  MSK: no edema noted  Skin: warm, dry  Psych: appropriate affect     Data    CBC:   Lab Results   Component Value Date    WBC 8.9 08/25/2021    RBC 4.17 08/25/2021    HGB 12.8 08/25/2021    HCT 37.1 08/25/2021    MCV 88.9 08/25/2021    MCH 30.6 08/25/2021    MCHC 34.4 08/25/2021    RDW 12.6 08/25/2021     08/25/2021     CMP:    Lab Results   Component Value Date     08/25/2021    K 3.9 08/25/2021    CL 98 08/25/2021    CO2 28 08/25/2021    BUN 47 08/25/2021    CREATININE 0.66 08/25/2021    GFRAA >60.0 08/25/2021    LABGLOM >60.0 08/25/2021    GLUCOSE 241 08/25/2021    PROT 5.8 08/25/2021    LABALBU 2.9 08/25/2021    CALCIUM 9.3 08/25/2021    BILITOT 0.6 08/25/2021    ALKPHOS 81 08/25/2021    AST 17 08/25/2021    ALT 21 08/25/2021       ASSESSMENT AND PLAN      # Acute hypoxic respiratory failure 2/2 COVID-19 pneumonia  - continues to require high flow nasal cannula - wean as tolerated  - last dose of Remdesivir 8/23. Continuing decadron  - ID consulted, pulm consulted    # IDDM  - continue to monitor while on steroids  - lantus, ISS    # HTN/HLD  - cont home meds    DVT: lovenox per covid protocol    Disposition: Pt feels well.  Still on high flow. Wean as tolerated. Continuing decadron. ID and pulm consulted. Was able to wean down O2 today.        Linda Morris DO  Internal Medicine

## 2021-08-25 NOTE — PROGRESS NOTES
Infectious Disease     Patient Name: Michelle Merida  Date: 8/25/2021  YOB: 1932  Medical Record Number: [id number]      COVID-19 pneumonia        The EXAMINATION: CT scan of the chest with contrast (pulmonary embolism protocol)       INDICATION: Chest pain and shortness of breath.       COMPARISON: None       TECHNIQUE: Helical CT was performed through the chest utilizing 100 cc of Isovue-300 intravenous contrast.  Images were obtained with bolus tracking in order to opacify the pulmonary arteries.  Both MIP and 3D volume rendered reconstructions were    performed.       FINDINGS: There are no findings central, proximal proximal-segmental pulmonary emboli.       There is mild to moderate emphysematous disease. There is areas of patchy multifocal to coalescent airspace disease throughout the lung parenchyma which has shown interval progression, worsening as compared to the prior examination. No pleural effusions. No pneumothoraces.       There is nonspecific subcentimeter periaortic adenopathy. There is pretracheal adenopathy. There is bilateral perihilar adenopathy. No significant subcarinal adenopathy.       In the field-of-view the abdomen is a moderate to large hiatal hernia.       There is multilevel degenerative changes with bridging osteophytes of the thoracic spine superpose upon a mild dorsal kyphosis.       Nodule left lobe of thyroid measuring approximately 9 mm.               Impression   1. NO FINDINGS CENTRAL, PROXIMAL OR PROXIMAL- SEGMENTAL PULMONARY EMBOLI. .     2. THERE IS MILD EMPHYSEMATOUS DISEASE AND CHRONIC INTERSTITIAL CHANGES WITH WORSENING AREAS OF PATCHY MULTIFOCAL to COALESCENT AIRSPACE DISEASE WITHIN THE LUNG PARENCHYMA. COMMONLY REPORT IMAGING FEATURES OF (COVID-19) PNEUMONIA ARE PRESENT. OTHER    PROCESSES SUCH AS INFLUENZA, PNEUMONIA AND ORGANIZING PNEUMONIA AS CAN BE SEEN WITH DRUG TOXICITY AND CONNECTIVE TISSUE DISEASE CAN CAUSE A SIMILAR IMAGING PATTERN.     3. MODERATE HCT 37.1 08/25/2021    MCV 88.9 08/25/2021     08/25/2021     Lab Results   Component Value Date     08/25/2021    K 3.9 08/25/2021    CL 98 08/25/2021    CO2 28 08/25/2021    BUN 47 08/25/2021    CREATININE 0.66 08/25/2021    GLUCOSE 241 08/25/2021    CALCIUM 9.3 08/25/2021              PLAN:    COVID-19 pneumonia   dexamethasone   remdesivir completed   O2 improved

## 2021-08-26 VITALS
SYSTOLIC BLOOD PRESSURE: 147 MMHG | RESPIRATION RATE: 22 BRPM | TEMPERATURE: 97.9 F | HEART RATE: 97 BPM | OXYGEN SATURATION: 93 % | WEIGHT: 135 LBS | BODY MASS INDEX: 23.92 KG/M2 | DIASTOLIC BLOOD PRESSURE: 92 MMHG | HEIGHT: 63 IN

## 2021-08-26 LAB
ALBUMIN SERPL-MCNC: 3.2 G/DL (ref 3.5–4.6)
ALP BLD-CCNC: 96 U/L (ref 40–130)
ALT SERPL-CCNC: 23 U/L (ref 0–33)
ANION GAP SERPL CALCULATED.3IONS-SCNC: 13 MEQ/L (ref 9–15)
AST SERPL-CCNC: 19 U/L (ref 0–35)
BASOPHILS ABSOLUTE: 0 K/UL (ref 0–0.2)
BASOPHILS RELATIVE PERCENT: 0.1 %
BILIRUB SERPL-MCNC: 0.5 MG/DL (ref 0.2–0.7)
BUN BLDV-MCNC: 40 MG/DL (ref 8–23)
CALCIUM SERPL-MCNC: 9.7 MG/DL (ref 8.5–9.9)
CHLORIDE BLD-SCNC: 97 MEQ/L (ref 95–107)
CO2: 25 MEQ/L (ref 20–31)
CREAT SERPL-MCNC: 0.72 MG/DL (ref 0.5–0.9)
EOSINOPHILS ABSOLUTE: 0 K/UL (ref 0–0.7)
EOSINOPHILS RELATIVE PERCENT: 0 %
GFR AFRICAN AMERICAN: >60
GFR NON-AFRICAN AMERICAN: >60
GLOBULIN: 3.1 G/DL (ref 2.3–3.5)
GLUCOSE BLD-MCNC: 144 MG/DL (ref 60–115)
GLUCOSE BLD-MCNC: 148 MG/DL (ref 60–115)
GLUCOSE BLD-MCNC: 222 MG/DL (ref 60–115)
GLUCOSE BLD-MCNC: 299 MG/DL (ref 70–99)
GLUCOSE BLD-MCNC: 97 MG/DL (ref 60–115)
HCT VFR BLD CALC: 39.5 % (ref 37–47)
HEMOGLOBIN: 13.8 G/DL (ref 12–16)
LYMPHOCYTES ABSOLUTE: 0.7 K/UL (ref 1–4.8)
LYMPHOCYTES RELATIVE PERCENT: 5.3 %
MCH RBC QN AUTO: 30.8 PG (ref 27–31.3)
MCHC RBC AUTO-ENTMCNC: 34.8 % (ref 33–37)
MCV RBC AUTO: 88.3 FL (ref 82–100)
MONOCYTES ABSOLUTE: 0.8 K/UL (ref 0.2–0.8)
MONOCYTES RELATIVE PERCENT: 6.1 %
NEUTROPHILS ABSOLUTE: 11.2 K/UL (ref 1.4–6.5)
NEUTROPHILS RELATIVE PERCENT: 88.5 %
PDW BLD-RTO: 12.7 % (ref 11.5–14.5)
PERFORMED ON: ABNORMAL
PERFORMED ON: NORMAL
PLATELET # BLD: 470 K/UL (ref 130–400)
POTASSIUM REFLEX MAGNESIUM: 4.2 MEQ/L (ref 3.4–4.9)
RBC # BLD: 4.47 M/UL (ref 4.2–5.4)
SODIUM BLD-SCNC: 135 MEQ/L (ref 135–144)
TOTAL PROTEIN: 6.3 G/DL (ref 6.3–8)
WBC # BLD: 12.7 K/UL (ref 4.8–10.8)

## 2021-08-26 PROCEDURE — 6370000000 HC RX 637 (ALT 250 FOR IP): Performed by: INTERNAL MEDICINE

## 2021-08-26 PROCEDURE — 2500000003 HC RX 250 WO HCPCS: Performed by: INTERNAL MEDICINE

## 2021-08-26 PROCEDURE — 2580000003 HC RX 258: Performed by: INTERNAL MEDICINE

## 2021-08-26 PROCEDURE — 94760 N-INVAS EAR/PLS OXIMETRY 1: CPT

## 2021-08-26 PROCEDURE — 99232 SBSQ HOSP IP/OBS MODERATE 35: CPT | Performed by: INTERNAL MEDICINE

## 2021-08-26 PROCEDURE — 80053 COMPREHEN METABOLIC PANEL: CPT

## 2021-08-26 PROCEDURE — 85025 COMPLETE CBC W/AUTO DIFF WBC: CPT

## 2021-08-26 PROCEDURE — 94640 AIRWAY INHALATION TREATMENT: CPT

## 2021-08-26 PROCEDURE — 2700000000 HC OXYGEN THERAPY PER DAY

## 2021-08-26 PROCEDURE — 6360000002 HC RX W HCPCS: Performed by: INTERNAL MEDICINE

## 2021-08-26 PROCEDURE — 36415 COLL VENOUS BLD VENIPUNCTURE: CPT

## 2021-08-26 RX ORDER — GUAIFENESIN/DEXTROMETHORPHAN 100-10MG/5
5 SYRUP ORAL EVERY 4 HOURS PRN
Qty: 120 ML | Refills: 0 | Status: SHIPPED | OUTPATIENT
Start: 2021-08-26 | End: 2021-09-05

## 2021-08-26 RX ORDER — CEPHALEXIN 250 MG/1
500 CAPSULE ORAL 3 TIMES DAILY
Qty: 30 CAPSULE | Refills: 0 | Status: SHIPPED | OUTPATIENT
Start: 2021-08-26 | End: 2021-08-31

## 2021-08-26 RX ORDER — DEXAMETHASONE 6 MG/1
6 TABLET ORAL
Qty: 5 TABLET | Refills: 0 | Status: SHIPPED | OUTPATIENT
Start: 2021-08-26 | End: 2021-08-31

## 2021-08-26 RX ADMIN — ENOXAPARIN SODIUM 30 MG: 30 INJECTION SUBCUTANEOUS at 08:17

## 2021-08-26 RX ADMIN — Medication 10 ML: at 08:17

## 2021-08-26 RX ADMIN — ALBUTEROL SULFATE 2 PUFF: 90 AEROSOL, METERED RESPIRATORY (INHALATION) at 12:02

## 2021-08-26 RX ADMIN — INSULIN LISPRO 6 UNITS: 100 INJECTION, SOLUTION INTRAVENOUS; SUBCUTANEOUS at 12:45

## 2021-08-26 RX ADMIN — ASPIRIN 81 MG: 81 TABLET, COATED ORAL at 08:17

## 2021-08-26 RX ADMIN — Medication 2000 UNITS: at 08:17

## 2021-08-26 RX ADMIN — METOPROLOL SUCCINATE 50 MG: 50 TABLET, FILM COATED, EXTENDED RELEASE ORAL at 08:17

## 2021-08-26 RX ADMIN — INSULIN LISPRO 6 UNITS: 100 INJECTION, SOLUTION INTRAVENOUS; SUBCUTANEOUS at 08:26

## 2021-08-26 RX ADMIN — INSULIN LISPRO 2 UNITS: 100 INJECTION, SOLUTION INTRAVENOUS; SUBCUTANEOUS at 08:26

## 2021-08-26 RX ADMIN — INSULIN LISPRO 1 UNITS: 100 INJECTION, SOLUTION INTRAVENOUS; SUBCUTANEOUS at 12:45

## 2021-08-26 RX ADMIN — ALBUTEROL SULFATE 2 PUFF: 90 AEROSOL, METERED RESPIRATORY (INHALATION) at 07:24

## 2021-08-26 RX ADMIN — FAMOTIDINE 20 MG: 10 INJECTION INTRAVENOUS at 08:17

## 2021-08-26 ASSESSMENT — ENCOUNTER SYMPTOMS
GASTROINTESTINAL NEGATIVE: 1
SHORTNESS OF BREATH: 1
COUGH: 1

## 2021-08-26 NOTE — PROGRESS NOTES
Infectious Disease     Patient Name: Macrina Gamboa  Date: 8/26/2021  YOB: 1932  Medical Record Number: [id number]      COVID-19 pneumonia        The EXAMINATION: CT scan of the chest with contrast (pulmonary embolism protocol)       INDICATION: Chest pain and shortness of breath.       COMPARISON: None       TECHNIQUE: Helical CT was performed through the chest utilizing 100 cc of Isovue-300 intravenous contrast.  Images were obtained with bolus tracking in order to opacify the pulmonary arteries.  Both MIP and 3D volume rendered reconstructions were    performed.       FINDINGS: There are no findings central, proximal proximal-segmental pulmonary emboli.       There is mild to moderate emphysematous disease. There is areas of patchy multifocal to coalescent airspace disease throughout the lung parenchyma which has shown interval progression, worsening as compared to the prior examination. No pleural effusions. No pneumothoraces.       There is nonspecific subcentimeter periaortic adenopathy. There is pretracheal adenopathy. There is bilateral perihilar adenopathy. No significant subcarinal adenopathy.       In the field-of-view the abdomen is a moderate to large hiatal hernia.       There is multilevel degenerative changes with bridging osteophytes of the thoracic spine superpose upon a mild dorsal kyphosis.       Nodule left lobe of thyroid measuring approximately 9 mm.               Impression   1. NO FINDINGS CENTRAL, PROXIMAL OR PROXIMAL- SEGMENTAL PULMONARY EMBOLI. .     2. THERE IS MILD EMPHYSEMATOUS DISEASE AND CHRONIC INTERSTITIAL CHANGES WITH WORSENING AREAS OF PATCHY MULTIFOCAL to COALESCENT AIRSPACE DISEASE WITHIN THE LUNG PARENCHYMA. COMMONLY REPORT IMAGING FEATURES OF (COVID-19) PNEUMONIA ARE PRESENT. OTHER    PROCESSES SUCH AS INFLUENZA, PNEUMONIA AND ORGANIZING PNEUMONIA AS CAN BE SEEN WITH DRUG TOXICITY AND CONNECTIVE TISSUE DISEASE CAN CAUSE A SIMILAR IMAGING PATTERN.     3. MODERATE TO LARGE HIATAL HERNIA. 4. NODULE LEFT LOBE OF THYROID. CORRELATE CLINICALLY FOLLOW-UP       Other findings detailed above       EXAMINATION: Portable AP ERECT view of the chest.       CLINICAL HISTORY:  sob, hypoxia , Positive Covid-19 test.        DATE: 8/18/2021 2:01 PM       COMPARISONS: 8/13/2021       FINDINGS: The heart is mildly enlarged, unchanged. There are atherosclerotic calcifications in the aortic arch. There are peripheral groundglass infiltrates in the mid lungs in lung bases. Infiltrates mildly increased since prior exam. Findings may be    secondary to viral pneumonia. Patient also be secondary to fluid overload or congestive heart failure. No pleural effusion or pneumothorax.  The bony thorax is unremarkable.           Impression   PERIPHERAL GROUNDGLASS INFILTRATES IN BOTH LUNGS, PRIMARILY IN THE MIDLUNGS AND LUNG BASES. MILD INCREASING INFILTRATES. DIFFERENTIAL DIAGNOSIS INCLUDES FLUID OVERLOAD, CHF OR VIRAL PNEUMONIA. 93% saturated 6 L        Review of Systems   Constitutional: Negative. Respiratory: Positive for cough and shortness of breath. Cardiovascular: Negative. Gastrointestinal: Negative. Physical Exam  Cardiovascular:      Heart sounds: Normal heart sounds. No murmur heard. Pulmonary:      Effort: Pulmonary effort is normal. No respiratory distress. Breath sounds: Normal breath sounds. No stridor. No wheezing, rhonchi or rales. Abdominal:      General: Abdomen is flat. Bowel sounds are normal. There is no distension. Palpations: There is no mass. Tenderness: There is no abdominal tenderness. There is no guarding. Musculoskeletal:      Cervical back: Normal range of motion and neck supple. Blood pressure (!) 147/92, pulse 97, temperature 97.9 °F (36.6 °C), temperature source Oral, resp. rate 22, height 5' 3\" (1.6 m), weight 135 lb (61.2 kg), SpO2 93 %, not currently breastfeeding.       .   Lab Results   Component Value Date

## 2021-08-26 NOTE — PROGRESS NOTES
INPATIENT PROGRESS NOTES    PATIENT NAME: Dane Velazquez  MRN: [id number]  SERVICE DATE:  August 26, 2021   SERVICE TIME:  12:56 PM      PRIMARY SERVICE: Pulmonary Disease    CHIEF COMPLAIN: COVID-19 pneumonia and respiratory failure      INTERVAL HPI: Patient seen and examined at bedside, Interval Notes, orders reviewed. Nursing notes noted  She is on high flow oxygen via nasal cannula O2 saturation 93%. She is on 6 L O2. O2 saturation is 93%. . She states she is feeling much better. She is sitting in a chair. No complaint of shortness of breath with O2 on. She does have mild short of breath with exertion. No cough. No chest pain. No nausea vomiting diarrhea. No fever or chills. OBJECTIVE    Body mass index is 23.91 kg/m². PHYSICAL EXAM:  Vitals:  BP (!) 147/92   Pulse 97   Temp 97.9 °F (36.6 °C) (Oral)   Resp 22   Ht 5' 3\" (1.6 m)   Wt 135 lb (61.2 kg)   SpO2 93%   BMI 23.91 kg/m²   General: Alert, awake . comfortable in bed, No distress. Head: Atraumatic , Normocephalic   Eyes: PERRL. No sclera icterus. No conjunctival injection. No discharge   ENT: No nasal  discharge. Pharynx clear. Neck:  Trachea midline. No thyromegaly, no JVD, No cervical adenopathy. Chest : Bilaterally symmetrical ,Normal effort,  No accessory muscle use  Lung : . Fair BS bilateral, decreased BS at bases. No rales. No wheezing. No rhonchi. Heart[de-identified] Normal  rate. Regular rhythm. No mumur ,  Rub or gallop  ABD: Non-tender. Non-distended. No masses. No organmegaly. Normal bowel sounds. No hernia.   Ext : No Pitting both leg , No Cyanosis No clubbing  Neuro: no focal weakness          DATA:   Recent Labs     08/25/21  0547 08/26/21  0610   WBC 8.9 12.7*   HGB 12.8 13.8   HCT 37.1 39.5   MCV 88.9 88.3    470*     Recent Labs     08/25/21  0547 08/25/21  0547 08/26/21  0610     --  135   K 3.9  --  4.2   CL 98  --  97   CO2 28  --  25   BUN 47*  --  40*   CREATININE 0.66  --  0.72   GLUCOSE 241*  --  299* CALCIUM 9.3  --  9.7   PROT 5.8*  --  6.3   LABALBU 2.9*  --  3.2*   BILITOT 0.6  --  0.5   ALKPHOS 81  --  96   AST 17  --  19   ALT 21   < > 23   LABGLOM >60.0   < > >60.0   GFRAA >60.0   < > >60.0   GLOB 2.9   < > 3.1    < > = values in this interval not displayed. MV Settings:     FiO2 : 40 %    No results for input(s): PHART, GQF2JCP, PO2ART, NCW4EOR, BEART, D6ZPBPZR in the last 72 hours. O2 Device: Nasal cannula  O2 Flow Rate (L/min): 6 L/min    Adult Oral Nutrition Supplement; Diabetic Oral Supplement  ADULT DIET; Regular; 4 carb choices (60 gm/meal); Safety Tray; Safety Tray (Disposables)     MEDICATIONS during current hospitalization:    Continuous Infusions:   dextrose      sodium chloride         Scheduled Meds:   insulin glargine  0.15 Units/kg Subcutaneous Nightly    insulin lispro  0.1 Units/kg Subcutaneous TID WC    insulin lispro  0-6 Units Subcutaneous TID WC    insulin lispro  0-3 Units Subcutaneous Nightly    albuterol sulfate HFA  2 puff Inhalation TID    sodium chloride flush  5-40 mL IntraVENous 2 times per day    dexamethasone  6 mg IntraVENous Q24H    Vitamin D  2,000 Units Oral Daily    enoxaparin  30 mg Subcutaneous BID    famotidine (PEPCID) injection  20 mg IntraVENous Daily    aspirin  81 mg Oral BID    metoprolol succinate  50 mg Oral Daily    pravastatin  40 mg Oral Nightly       PRN Meds:glucose, dextrose, glucagon (rDNA), dextrose, albuterol sulfate HFA, sodium chloride flush, sodium chloride, ondansetron **OR** ondansetron, polyethylene glycol, acetaminophen **OR** acetaminophen, guaiFENesin-dextromethorphan    Radiology  CTA Chest W WO  (PE study)    Result Date: 8/18/2021  The EXAMINATION: CT scan of the chest with contrast (pulmonary embolism protocol) INDICATION: Chest pain and shortness of breath.  COMPARISON: None TECHNIQUE: Helical CT was performed through the chest utilizing 100 cc of Isovue-300 intravenous contrast.  Images were obtained with bolus tracking in order to opacify the pulmonary arteries. Both MIP and 3D volume rendered reconstructions were performed. FINDINGS: There are no findings central, proximal proximal-segmental pulmonary emboli. There is mild to moderate emphysematous disease. There is areas of patchy multifocal to coalescent airspace disease throughout the lung parenchyma which has shown interval progression, worsening as compared to the prior examination. No pleural effusions. No pneumothoraces. There is nonspecific subcentimeter periaortic adenopathy. There is pretracheal adenopathy. There is bilateral perihilar adenopathy. No significant subcarinal adenopathy. In the field-of-view the abdomen is a moderate to large hiatal hernia. There is multilevel degenerative changes with bridging osteophytes of the thoracic spine superpose upon a mild dorsal kyphosis. Nodule left lobe of thyroid measuring approximately 9 mm. 1.NO FINDINGS CENTRAL, PROXIMAL OR PROXIMAL- SEGMENTAL PULMONARY EMBOLI. .  2. THERE IS MILD EMPHYSEMATOUS DISEASE AND CHRONIC INTERSTITIAL CHANGES WITH WORSENING AREAS OF PATCHY MULTIFOCAL to COALESCENT AIRSPACE DISEASE WITHIN THE LUNG PARENCHYMA. COMMONLY REPORT IMAGING FEATURES OF (COVID-19) PNEUMONIA ARE PRESENT. OTHER PROCESSES SUCH AS INFLUENZA, PNEUMONIA AND ORGANIZING PNEUMONIA AS CAN BE SEEN WITH DRUG TOXICITY AND CONNECTIVE TISSUE DISEASE CAN CAUSE A SIMILAR IMAGING PATTERN. 3. MODERATE TO LARGE HIATAL HERNIA. 4. NODULE LEFT LOBE OF THYROID. CORRELATE CLINICALLY FOLLOW-UP Other findings detailed above All CT scans at this facility use dose modulation, iterative reconstruction, and/or weight based dosing when appropriate to reduce radiation dose to as low as reasonably achievable. CTA Chest W WO  (PE study)    Result Date: 8/13/2021  EXAM:CTA CHEST W WO CONTRAST DATE8/13/2021 1:07 PM: REASON FOR EXAM:Acute severe anterior chest wall pain.   covid hypoxia COMPARISON: none Technique: Helical CT was performed through the chest following the IV administration of100  cc of nonionic contrast. 3-D MIP reconstructions were performed on an independent workstation in the coronal plane with thick slab technique utilized in the interpretation. 3-D maximum intensity projection performed. All CT scans at this facility use dose modulation, iterative reconstruction, and/or weight based dosing when appropriate to reduce radiation dose to as low as reasonably achievable. CHEST CTA  FINDINGS: There is an 11 mm inhomogeneous nodule in the left lobe of the thyroid gland. Mediastinum: There are mild hilar and mediastinal lymph nodes, likely related to reactive nodes. The chest wall and lower neck are normal Heart: The heart is enlarged. There is no pericardial effusion. Vascular structures: There is no evidence for thoracic aortic aneurysm or dissection. No evidence of traumatic aortic injury. There are no persistent filling defects within the pulmonary arteries. Lungs: There are patchy groundglass alveolar alveolar opacities in both lungs worrisome for early infiltrates possible viral pneumonia, COVID-19. Pleura: No pleural effusion or thickening. Upper abdomen: There is a 3.5 x 5.4 cm hiatal hernia containing the proximal stomach. The visualized portions of the upper abdomen are unremarkable. Bones/axillae/soft tissues: Osseous structures and chest wall are normal.     Negative CTA of the chest. No evidence of thoracic aortic dissection or aneurysm. The study is negative for pulmonary emboli. There are patchy bilateral alveolar opacities worrisome for early infiltrates possible viral pneumonia. Differential diagnosis includes COVID-19. XR CHEST PORTABLE    Result Date: 8/21/2021  EXAMINATION: CHEST PORTABLE VIEW  CLINICAL HISTORY: Worsening hypoxia COMPARISONS: August 18, 2021 1412 hours  FINDINGS: Single  views of the chest is submitted. The cardiac silhouette is enlarged. . Pulmonary vascular attenuated. Right sided trachea. Increasing bibasilar patchy multifocal to coalescent airspace disease particularly within the bases. No Pneumothoraces. INCREASING BIBASILAR PATCHY MULTIFOCAL TO COALESCENT AIRSPACE DISEASE PARTICULARLY WITHIN THE BASES SUPERIMPOSED UPON RADIOGRAPHIC FINDINGS OF COPD. CORRELATE CLINICALLY    XR CHEST PORTABLE    Result Date: 8/18/2021  EXAMINATION: Portable AP ERECT view of the chest. CLINICAL HISTORY:  sob, hypoxia , Positive Covid-19 test. DATE: 8/18/2021 2:01 PM COMPARISONS: 8/13/2021 FINDINGS: The heart is mildly enlarged, unchanged. There are atherosclerotic calcifications in the aortic arch. There are peripheral groundglass infiltrates in the mid lungs in lung bases. Infiltrates mildly increased since prior exam. Findings may be secondary to viral pneumonia. Patient also be secondary to fluid overload or congestive heart failure. No pleural effusion or pneumothorax. The bony thorax is unremarkable. PERIPHERAL GROUNDGLASS INFILTRATES IN BOTH LUNGS, PRIMARILY IN THE MIDLUNGS AND LUNG BASES. MILD INCREASING INFILTRATES. DIFFERENTIAL DIAGNOSIS INCLUDES FLUID OVERLOAD, CHF OR VIRAL PNEUMONIA. XR CHEST PORTABLE    Result Date: 8/13/2021  Exam: XR CHEST PORTABLE History:  cough Technique: AP portable view of the chest obtained. Comparison: Chest x-ray from March 13, 2018 Chest x-ray portable Findings: The cardiac silhouette is enlarged. There are mild increased interstitial markings of both lungs which may reflect fluid overload versus congestive heart failure. There are no pleural effusions. . Bones of the thorax appear intact. Increased pulmonary markings indicate fluid overload, CHF, or viral pneumonia. IMPRESSION AND SUGGESTION:  1. Acute hypoxic respiratory failure on high flow O2  2. COVID-19 infection with pneumonia  3. IDDM  4. Hypertension  5. Hyperlipidemia    Patient is on 6 L O2 via nasal cannula.   O2 saturation is 93%. Try to get O2 via nasal cannula to see if tolerated. Finished remdesivir. Continue Decadron. Continue bronchodilator therapy. ID is following. Continue present treatment plan. Discharge when okay by all physician    NOTE: This report was transcribed using voice recognition software. Every effort was made to ensure accuracy; however, inadvertent computerized transcription errors may be present.       Electronically signed by Jeremias Craven MD, [facility] on 8/26/2021 at 12:56 PM

## 2021-08-26 NOTE — DISCHARGE SUMMARY
Physician Discharge Summary     Patient ID:  Leta Ford  [id number]  26 y.o.  12/12/1932    Admit date: 8/18/2021    Discharge date : 08/26/21     Admitting Physician: No admitting provider for patient encounter. Discharge Physician: Chantal Sanchez DO     Admission Diagnoses: Hypoxia [R09.02]  Acute respiratory disease due to COVID-19 virus [U07.1, J06.9]  COVID-19 [U07.1]    Discharge Diagnoses: Acute hypoxic respiratory failure 2/2 COVID-19 pneumonia    Admission Condition: fair    Discharged Condition: fair    Hospital Course: Pt admitted for hypoxia 2/2 COVID. She completed course of remdesivir and was continued on decadron. Pt was requiring high flow nasal cannula despite minimal symptoms and was slow to be weaned. She was able to be weaned down to 40% on 8/25 and was weaned down to 4L on 8/26. Continued to be without symptoms. Home O2 was set up. Pulm and ID were ok for discharge and patient discharged home in stable and improved condition. Consults: pulmonary/intensive care and ID      Discharge Exam:  Constitutional: Awake and alert in no acute distress.  Lying in bed comfortably  Head: Normocephalic, atraumatic  Eyes: EOMI, PERRLA  ENT: moist mucous membranes, nasal cannula  Neck: neck supple, trachea midline  Lungs: Good inspiratory effort, no wheeze, no rhonchi, no rales  Heart: RRR, normal S1 and S2  GI: Soft, non-distended, non tender, no guarding, no rebound, +BS  MSK: no edema noted  Skin: warm, dry  Psych: appropriate affect    Labs:   Recent Labs     08/24/21  0527 08/25/21  0547 08/26/21  0610   WBC 9.5 8.9 12.7*   HGB 13.0 12.8 13.8   HCT 37.0 37.1 39.5   * 400 470*     Recent Labs     08/24/21  0527 08/25/21  0547 08/26/21  0610    137 135   K 3.8 3.9 4.2   CL 99 98 97   CO2 29 28 25   BUN 49* 47* 40*   CREATININE 0.64 0.66 0.72   CALCIUM 9.3 9.3 9.7     Recent Labs     08/24/21  0527 08/25/21  0547 08/26/21  0610   AST 18 17 19   ALT 23 21 23   BILITOT 0.6 0.6 0.5   ALKPHOS 87 81 96     No results for input(s): INR in the last 72 hours. No results for input(s): Kathy Comment in the last 72 hours. Urinalysis:   Lab Results   Component Value Date    NITRU Negative 08/18/2021    WBCUA 3-5 08/18/2021    BACTERIA RARE 08/18/2021    RBCUA 6-10 08/18/2021    BLOODU MODERATE 08/18/2021    SPECGRAV 1.029 08/18/2021    GLUCOSEU 100 08/18/2021       Radiology:   Most recent    Chest CT      WITH CONTRAST:No results found for this or any previous visit. WITHOUT CONTRAST: No results found for this or any previous visit. CXR      2-view: Results for orders placed during the hospital encounter of 03/13/18    XR CHEST STANDARD (2 VW)    Narrative  XR CHEST (2 VW): 3/13/2018    CLINICAL HISTORY: R05 Cough ICD10. COMPARISON: 9/19/2017. Upright PA and lateral radiographs of the chest were obtained. FINDINGS:    Hyperinflation and coarsening of the bronchovascular structures consistent with COPD appears unchanged. There are no developing infiltrates, pleural effusion, cardiomegaly, vascular congestion, pneumothorax, or displaced fractures identified. A small hiatal hernia is again noted. Impression  NO EVIDENCE OF ACTIVE CARDIOPULMONARY DISEASE. Results for orders placed during the hospital encounter of 09/15/17    XR Chest Standard TWO VW    Narrative  EXAMINATION: XR CHEST STANDARD TWO VW    CLINICAL HISTORY:  Basilar atelectasis    COMPARISONS: September 18, 2017. September 16, 2017 CT. FINDINGS: Frontal and lateral views the chest were obtained. Heart size is at upper limits of normal. Small bibasilar atelectasis remains visible, best shown on lateral view and is improved compared to CT thorax performed on September 16, 2017. The small  bibasilar atelectasis is more conspicuous today than yesterday due to technical factors, best appreciated on lateral view. CONCLUSION: IMPROVING BASILAR ATELECTASIS, BEST SHOWN ON LATERAL VIEW.  NO NEW ABNORMALITY. Portable: Results for orders placed during the hospital encounter of 08/18/21    XR CHEST PORTABLE    Narrative  EXAMINATION: CHEST PORTABLE VIEW    CLINICAL HISTORY: Worsening hypoxia    COMPARISONS: August 18, 2021 1412 hours    FINDINGS:    Single  views of the chest is submitted. The cardiac silhouette is enlarged. .  Pulmonary vascular attenuated. Right sided trachea. Increasing bibasilar patchy multifocal to coalescent airspace disease particularly within the bases. No Pneumothoraces. Impression  INCREASING BIBASILAR PATCHY MULTIFOCAL TO COALESCENT AIRSPACE DISEASE PARTICULARLY WITHIN THE BASES SUPERIMPOSED UPON RADIOGRAPHIC FINDINGS OF COPD. CORRELATE CLINICALLY      Echo No results found for this or any previous visit. Disposition: home    In process/preliminary results:  Outstanding Order Results     No orders found from 7/20/2021 to 8/19/2021.           Patient Instructions:   Current Discharge Medication List      CONTINUE these medications which have NOT CHANGED    Details   budesonide-formoterol (SYMBICORT) 160-4.5 MCG/ACT AERO Inhale 2 puffs into the lungs 2 times daily  Qty: 3 Inhaler, Refills: 1      potassium chloride (KLOR-CON M20) 20 MEQ extended release tablet 20 mEq daily       Coenzyme Q10 (CO Q 10 PO) Take by mouth      OXYGEN Please supply patient with a portable oxygen concentrator  Qty: 1 Units, Refills: 0    Associated Diagnoses: Hypoxia; Pulmonary hypertension (HCC)      aspirin 81 MG EC tablet Take 1 tablet by mouth 2 times daily  Qty: 60 tablet, Refills: 0      Magnesium 400 MG CAPS Take by mouth 2 times daily       Probiotic Product (PROBIOTIC & ACIDOPHILUS EX ST) CAPS Take by mouth      Multiple Vitamins-Minerals (PRESERVISION AREDS 2+MULTI VIT PO) Take by mouth      metoprolol succinate ER (TOPROL XL) 50 MG XL tablet Take 25 mg by mouth 2 times daily       amLODIPine (NORVASC) 5 MG tablet Take 5 mg by mouth daily At night      chlorthalidone (HYGROTON) 25

## 2021-08-26 NOTE — CARE COORDINATION
CALL PLACED TO DAUGHTER TO DISCUSS DC PLANNING. PATIENT HAD OWN CONCENTRATOR WITH ORDER FOR 2LNC AT HS. SINCE COVID PATIENT O2 DEMANDS HAVE INCREASED TO 4LNC CONTINUOUS AT HOME. HOME OXYGEN ORDER FAXED TO [facility] PER DAUGHTERS REQUEST. NURSE PROVIDED WITH 2 OXYGEN CYLINDERS FOR PATIENT TO TAKE HOME AND INSTRUCTED HOW TO OPEN TANK AND FOR PATIENT TO CALL [facility] SERVICES NUMBER PROVIDED WHEN ARRIVAL TO HOME.

## 2021-08-26 NOTE — PLAN OF CARE
Problem: Airway Clearance - Ineffective  Goal: Achieve or maintain patent airway  Outcome: Ongoing     Problem: Gas Exchange - Impaired  Goal: Absence of hypoxia  Outcome: Ongoing  Goal: Promote optimal lung function  Outcome: Ongoing     Problem: Breathing Pattern - Ineffective  Goal: Ability to achieve and maintain a regular respiratory rate  Outcome: Ongoing     Problem: Body Temperature -  Risk of, Imbalanced  Goal: Ability to maintain a body temperature within defined limits  Outcome: Ongoing  Goal: Will regain or maintain usual level of consciousness  Outcome: Ongoing  Goal: Complications related to the disease process, condition or treatment will be avoided or minimized  Outcome: Ongoing     Problem: Isolation Precautions - Risk of Spread of Infection  Goal: Prevent transmission of infection  Outcome: Ongoing     Problem: Nutrition Deficits  Goal: Optimize nutritional status  Outcome: Ongoing     Problem: Risk for Fluid Volume Deficit  Goal: Maintain normal heart rhythm  Outcome: Ongoing  Goal: Maintain absence of muscle cramping  Outcome: Ongoing  Goal: Maintain normal serum potassium, sodium, calcium, phosphorus, and pH  Outcome: Ongoing     Problem: Loneliness or Risk for Loneliness  Goal: Demonstrate positive use of time alone when socialization is not possible  Outcome: Ongoing     Problem: Fatigue  Goal: Verbalize increase energy and improved vitality  Outcome: Ongoing     Problem: Patient Education: Go to Patient Education Activity  Goal: Patient/Family Education  Outcome: Ongoing     Problem:  Activity:  Goal: Fatigue will decrease  Outcome: Ongoing     Problem: Cardiac:  Goal: Hemodynamic stability will improve  Outcome: Ongoing     Problem: Coping:  Goal: Level of anxiety will decrease  Outcome: Ongoing  Goal: Ability to cope will improve  Outcome: Ongoing  Goal: Ability to establish a method of communication will improve  Outcome: Ongoing     Problem: Nutritional:  Goal: Consumption of the prescribed amount of daily calories will improve  Outcome: Ongoing     Problem: Respiratory:  Goal: Ability to maintain a clear airway will improve  Outcome: Ongoing  Goal: Ability to maintain adequate ventilation will improve  Outcome: Ongoing  Goal: Complications related to the disease process, condition or treatment will be avoided or minimized  Outcome: Ongoing     Problem: Skin Integrity:  Goal: Risk for impaired skin integrity will decrease  Outcome: Ongoing  Goal: Will show no infection signs and symptoms  Outcome: Ongoing  Goal: Absence of new skin breakdown  Outcome: Ongoing     Problem: Falls - Risk of:  Goal: Will remain free from falls  Outcome: Ongoing  Goal: Absence of physical injury  Outcome: Ongoing     Problem: Nutrition  Goal: Optimal nutrition therapy  Outcome: Ongoing

## 2021-08-26 NOTE — FLOWSHEET NOTE
1026-- Pt is A&Ox4. PERRLA. Equal strength, follows commands. No fever or chills. Dyspnea upon exertion, lung sounds are diminished in all lobes. Pt weaned down to 6L n/c, spo2 at 93%. Denies any chest pain, s1 and s2 present. Abdomen is soft and non-tender with active bowels x4. No edema, skin intact. No pain with urination reported. Reports of pt not sleeping well last night with confusion and agitation. Pt is calm and cooperative this morning and able to answer all questions appropriately. Up to chair. Spoke to daughter on phone and gave update per HIPPA code. Call light within reach.

## 2021-08-27 ENCOUNTER — CARE COORDINATION (OUTPATIENT)
Dept: CASE MANAGEMENT | Age: 86
End: 2021-08-27

## 2021-08-27 DIAGNOSIS — J12.82 PNEUMONIA DUE TO COVID-19 VIRUS: Primary | ICD-10-CM

## 2021-08-27 DIAGNOSIS — U07.1 PNEUMONIA DUE TO COVID-19 VIRUS: Primary | ICD-10-CM

## 2021-08-27 PROCEDURE — 1111F DSCHRG MED/CURRENT MED MERGE: CPT | Performed by: FAMILY MEDICINE

## 2021-08-27 NOTE — CARE COORDINATION
[facility] Transitions Initial Follow Up Call    Call within 2 business days of discharge: Yes    Patient: Jana Knowles Patient : 1932   MRN: [id number]  Reason for Admission: 2021 - 2021 CV-19 PN  Discharge Date: 21 RARS: Readmission Risk Score: 18  NR  CV-19    Last Discharge [facility]       Complaint Diagnosis Description Type Department Provider    21 Shortness of Breath; Positive For Covid-19 COVID-19 . .. ED to Hosp-Admission (Discharged) (ADMITTED) [address], DO; Lauri,... Needs PCP appt. Dtr to call next wk. Transitions of Care Initial Call    Was this an external facility discharge? No Discharge Facility:     Challenges to be reviewed by the provider   Additional needs identified to be addressed with provider: No  none             Method of communication with provider : none      Advance Care Planning:   Does patient have an Advance Directive: reviewed and current. Was this a readmission? No  Patient stated reason for admission: Dtr/caregiver reports some cough, not always productive, exertional SOB, and generalized weakness. Was concerned at pts level of confusion during hosp stay. Advised CV-19, PN/infection, senior displacement, and steroids can all play roll in hosp confusion. No urinary pain/urgency. States continued fogginess but knows her surroundings/people. No fevers, chills, or flu-like symptoms at this time. Had slightly loose BM today and reports good appetite and interest in meals. Sats 94-96% on 4L [place] con't. No HHC needs. Dtr to schedule PCP appt. Patients top risk factors for readmission: medical condition-CV-19 PN    Care Transition Nurse (CTN) contacted the family by telephone to perform post hospital discharge assessment. Verified name and  with family as identifiers. Provided introduction to self, and explanation of the CTN role.      CTN reviewed discharge instructions, medical action plan and red flags

## 2021-08-30 ENCOUNTER — TELEPHONE (OUTPATIENT)
Dept: FAMILY MEDICINE CLINIC | Age: 86
End: 2021-08-30

## 2021-08-31 ENCOUNTER — OFFICE VISIT (OUTPATIENT)
Dept: FAMILY MEDICINE CLINIC | Age: 86
End: 2021-08-31
Payer: MEDICARE

## 2021-08-31 VITALS — OXYGEN SATURATION: 88 % | WEIGHT: 135 LBS | HEIGHT: 63 IN | BODY MASS INDEX: 23.92 KG/M2

## 2021-08-31 DIAGNOSIS — U07.1 PNEUMONIA DUE TO COVID-19 VIRUS: Primary | ICD-10-CM

## 2021-08-31 DIAGNOSIS — J12.82 PNEUMONIA DUE TO COVID-19 VIRUS: Primary | ICD-10-CM

## 2021-08-31 PROCEDURE — 1111F DSCHRG MED/CURRENT MED MERGE: CPT | Performed by: FAMILY MEDICINE

## 2021-08-31 PROCEDURE — 99496 TRANSJ CARE MGMT HIGH F2F 7D: CPT | Performed by: FAMILY MEDICINE

## 2021-08-31 SDOH — ECONOMIC STABILITY: TRANSPORTATION INSECURITY
IN THE PAST 12 MONTHS, HAS LACK OF TRANSPORTATION KEPT YOU FROM MEETINGS, WORK, OR FROM GETTING THINGS NEEDED FOR DAILY LIVING?: NO

## 2021-08-31 SDOH — ECONOMIC STABILITY: TRANSPORTATION INSECURITY
IN THE PAST 12 MONTHS, HAS THE LACK OF TRANSPORTATION KEPT YOU FROM MEDICAL APPOINTMENTS OR FROM GETTING MEDICATIONS?: NO

## 2021-08-31 SDOH — ECONOMIC STABILITY: FOOD INSECURITY: WITHIN THE PAST 12 MONTHS, THE FOOD YOU BOUGHT JUST DIDN'T LAST AND YOU DIDN'T HAVE MONEY TO GET MORE.: NEVER TRUE

## 2021-08-31 SDOH — ECONOMIC STABILITY: FOOD INSECURITY: WITHIN THE PAST 12 MONTHS, YOU WORRIED THAT YOUR FOOD WOULD RUN OUT BEFORE YOU GOT MONEY TO BUY MORE.: NEVER TRUE

## 2021-08-31 ASSESSMENT — SOCIAL DETERMINANTS OF HEALTH (SDOH): HOW HARD IS IT FOR YOU TO PAY FOR THE VERY BASICS LIKE FOOD, HOUSING, MEDICAL CARE, AND HEATING?: NOT VERY HARD

## 2021-08-31 ASSESSMENT — PATIENT HEALTH QUESTIONNAIRE - PHQ9
SUM OF ALL RESPONSES TO PHQ9 QUESTIONS 1 & 2: 2
2. FEELING DOWN, DEPRESSED OR HOPELESS: 1
SUM OF ALL RESPONSES TO PHQ QUESTIONS 1-9: 2
1. LITTLE INTEREST OR PLEASURE IN DOING THINGS: 1

## 2021-08-31 NOTE — PROGRESS NOTES
Christel Carroll is a 80 y.o. female evaluated via audiovisual on 8/31/2021. Consent:  She and/or health care decision maker is aware that that she may receive a bill for this audiovisual service, depending on her insurance coverage, and has provided verbal consent to proceed: Yes      Note: not billable if this call serves to triage the patient into an appointment for the relevant concern      Keanu Appiah MD     8/31/2021  Christel Carroll is a 80 y.o. female being evaluated by a Virtual Visit (audio visual visit) encounter to address concerns as mentioned above. A caregiver was present when appropriate. Due to this being a TeleHealth encounter (During [facility] public health emergency), eServices were provided through a telephonic synchronous discussion virtually to substitute for in-person clinic visit. Patient and provider were located at their individual homes. --Keanu Appiah MD on 8/31/2021 at 12:47 PM    An electronic signature was used to authenticate this note. Post-Discharge Transitional Care Management Services or Hospital Follow Up      Christel Carroll   YOB: 1932    Date of Office Visit:  8/31/2021  Date of Hospital Admission: 8/18/21  Date of Hospital Discharge: 8/26/21  Readmission Risk Score(high >=14%.  Medium >=10%):Readmission Risk Score: 18      Care management risk score Rising risk (score 2-5) and Complex Care (Scores >=6): 1     Non face to face  following discharge, date last encounter closed (first attempt may have been earlier): 8/27/2021  3:57 PM 8/27/2021  3:57 PM    Call initiated 2 business days of discharge: Yes     Patient Active Problem List   Diagnosis    Degeneration of intervertebral disc of lumbosacral region    Age related osteoporosis    Right knee DJD    Hyperlipidemia    HTN (hypertension)    Macular degeneration    Chronic obstructive pulmonary disease ([facility] [address]    Legal blindness    Pseudophakia    Scotoma medications ordered at time of hospital discharge: Yes    Chief Complaint   Patient presents with    Follow-Up from [address] [place] [address] d/c 8/26; health has been declining; is acting confused and is not sleeping well at all; blood oxygen at 88%; coughing up a lot of mucus        HPI    Inpatient course: Discharge summary reviewed- see chart. Interval history/Current status: Patient was discharged on O2 and dexamethasone after being diagnosed with COVID PNA and treated with remdesivir and dexamethasone. Her O2 requirement at d/c was 4L. At that time she was referred to pulm and ID for f/u. Now, she is experiencing severe nighttime and mild daytime confusion and she is very short of breath. Her daughter is present during video visit and states that patient's O2 say on 8L gets as low as 75% and does not get above 90%. She has been proning at home. Daughter wonders if she is reacting to steroids. Review of Systems See above. Vitals:    08/31/21 1032   SpO2: (!) 88%   Weight: 135 lb (61.2 kg)   Height: 5' 3\" (1.6 m)     Body mass index is 23.91 kg/m². Wt Readings from Last 3 Encounters:   08/31/21 135 lb (61.2 kg)   08/19/21 135 lb (61.2 kg)   08/13/21 140 lb (63.5 kg)     BP Readings from Last 3 Encounters:   08/26/21 (!) 147/92   08/13/21 112/64   08/13/21 (!) 109/48       Physical Exam  Constitutional:       General: She is in acute distress. Appearance: She is well-developed. She is ill-appearing. HENT:      Head: Normocephalic. Pulmonary:      Effort: Tachypnea and respiratory distress present. No accessory muscle usage. Comments: Unable to speak more htan a few words at a time  Neurological:      Mental Status: She is alert. Comments: But fatigued             Assessment/Plan:  Isabel Dickey was seen today for follow-up from hospital.    Diagnoses and all orders for this visit:    Pneumonia due to COVID-19 virus  Comments:   Worsening with persistent hypoxia and dyspnea in spite of O2 8L NC. Daughter agrees to call 911 and have Mrs. Yayo Esparza transported to ED for further eval.  Orders:  -     DE DISCHARGE MEDS RECONCILED W/ CURRENT OUTPATIENT MED LIST            Medical Decision Making: high complexity

## 2021-09-01 ENCOUNTER — CARE COORDINATION (OUTPATIENT)
Dept: CASE MANAGEMENT | Age: 86
End: 2021-09-01

## 2021-09-08 ENCOUNTER — TELEPHONE (OUTPATIENT)
Dept: FAMILY MEDICINE CLINIC | Age: 86
End: 2021-09-08

## 2021-09-08 ENCOUNTER — PATIENT MESSAGE (OUTPATIENT)
Dept: FAMILY MEDICINE CLINIC | Age: 86
End: 2021-09-08

## 2021-09-08 NOTE — TELEPHONE ENCOUNTER
Patients daughter called in and said her mom was in the hospital twice. She is asking for a hospital follow up. . I do not have anything soon. If you let me know its ok to OB I will. .     Also daughter said patient was dehydrated when she went into the hospital and they stopped her chlorthalidone. She is asking if she should restart this? Also patient has a [facility] nurse that comes and they are wondering if they could do labs for cbc and metabolic?     Please advise and thank you

## 2021-09-08 NOTE — TELEPHONE ENCOUNTER
Scheduled patient for 9/13     Are you able to put the order in for labs and I can fax over.     Please and thank you

## 2021-09-10 DIAGNOSIS — J12.82 PNEUMONIA DUE TO COVID-19 VIRUS: ICD-10-CM

## 2021-09-10 DIAGNOSIS — I10 ESSENTIAL HYPERTENSION: Primary | ICD-10-CM

## 2021-09-10 DIAGNOSIS — U07.1 PNEUMONIA DUE TO COVID-19 VIRUS: ICD-10-CM

## 2021-09-10 LAB
ANION GAP SERPL CALCULATED.3IONS-SCNC: 11 MEQ/L (ref 9–15)
BASOPHILS ABSOLUTE: 0.1 K/UL (ref 0–0.2)
BASOPHILS RELATIVE PERCENT: 1 %
BUN BLDV-MCNC: 18 MG/DL (ref 8–23)
CALCIUM SERPL-MCNC: 9.3 MG/DL (ref 8.5–9.9)
CHLORIDE BLD-SCNC: 97 MEQ/L (ref 95–107)
CO2: 28 MEQ/L (ref 20–31)
CREAT SERPL-MCNC: 0.75 MG/DL (ref 0.5–0.9)
EOSINOPHILS ABSOLUTE: 0.2 K/UL (ref 0–0.7)
EOSINOPHILS RELATIVE PERCENT: 2.8 %
GFR AFRICAN AMERICAN: >60
GFR NON-AFRICAN AMERICAN: >60
GLUCOSE BLD-MCNC: 191 MG/DL (ref 70–99)
HCT VFR BLD CALC: 32.3 % (ref 37–47)
HEMOGLOBIN: 11.2 G/DL (ref 12–16)
LYMPHOCYTES ABSOLUTE: 0.9 K/UL (ref 1–4.8)
LYMPHOCYTES RELATIVE PERCENT: 11.1 %
MCH RBC QN AUTO: 31.2 PG (ref 27–31.3)
MCHC RBC AUTO-ENTMCNC: 34.8 % (ref 33–37)
MCV RBC AUTO: 89.7 FL (ref 82–100)
MONOCYTES ABSOLUTE: 0.4 K/UL (ref 0.2–0.8)
MONOCYTES RELATIVE PERCENT: 5 %
NEUTROPHILS ABSOLUTE: 6.3 K/UL (ref 1.4–6.5)
NEUTROPHILS RELATIVE PERCENT: 80.1 %
PDW BLD-RTO: 14.1 % (ref 11.5–14.5)
PLATELET # BLD: 226 K/UL (ref 130–400)
POTASSIUM SERPL-SCNC: 3.8 MEQ/L (ref 3.4–4.9)
RBC # BLD: 3.6 M/UL (ref 4.2–5.4)
SODIUM BLD-SCNC: 136 MEQ/L (ref 135–144)
WBC # BLD: 7.9 K/UL (ref 4.8–10.8)

## 2021-09-10 NOTE — TELEPHONE ENCOUNTER
From: Kenny Eli  To: Denis Rooney MD  Sent: 9/8/2021 2:01 PM EDT  Subject: Visit Follow-Up Question    Dr. Cammie Nails,  Can you please write an order for bloodwork for a CBC and Metabolic Panel for my mother, Kenny Eli as a follow up to make sure her glucose and electrolytes are stable since shes been home for a week? We have Manuel Flores with [facility] visiting twice a week.    Thank you,   Clif Pillai

## 2021-09-13 ENCOUNTER — OFFICE VISIT (OUTPATIENT)
Dept: FAMILY MEDICINE CLINIC | Age: 86
End: 2021-09-13
Payer: MEDICARE

## 2021-09-13 ENCOUNTER — HOSPITAL ENCOUNTER (OUTPATIENT)
Dept: GENERAL RADIOLOGY | Age: 86
Discharge: HOME | End: 2021-09-15
Payer: MEDICARE

## 2021-09-13 VITALS
RESPIRATION RATE: 17 BRPM | HEIGHT: 63 IN | DIASTOLIC BLOOD PRESSURE: 70 MMHG | TEMPERATURE: 96.4 F | OXYGEN SATURATION: 94 % | BODY MASS INDEX: 24.24 KG/M2 | SYSTOLIC BLOOD PRESSURE: 122 MMHG | WEIGHT: 136.8 LBS | HEART RATE: 85 BPM

## 2021-09-13 DIAGNOSIS — J12.82 PNEUMONIA DUE TO COVID-19 VIRUS: Primary | ICD-10-CM

## 2021-09-13 DIAGNOSIS — J12.82 PNEUMONIA DUE TO COVID-19 VIRUS: ICD-10-CM

## 2021-09-13 DIAGNOSIS — E83.42 HYPOMAGNESEMIA: ICD-10-CM

## 2021-09-13 DIAGNOSIS — R41.0 ACUTE DELIRIUM: ICD-10-CM

## 2021-09-13 DIAGNOSIS — R89.9 ABNORMAL LABORATORY TEST RESULT: ICD-10-CM

## 2021-09-13 DIAGNOSIS — R60.9 CHRONIC EDEMA: ICD-10-CM

## 2021-09-13 DIAGNOSIS — U07.1 PNEUMONIA DUE TO COVID-19 VIRUS: Primary | ICD-10-CM

## 2021-09-13 DIAGNOSIS — U07.1 PNEUMONIA DUE TO COVID-19 VIRUS: ICD-10-CM

## 2021-09-13 DIAGNOSIS — E04.1 THYROID NODULE: ICD-10-CM

## 2021-09-13 DIAGNOSIS — R79.9 ABNORMAL FINDING OF BLOOD CHEMISTRY, UNSPECIFIED: ICD-10-CM

## 2021-09-13 DIAGNOSIS — E87.1 HYPONATREMIA: ICD-10-CM

## 2021-09-13 PROCEDURE — 71046 X-RAY EXAM CHEST 2 VIEWS: CPT

## 2021-09-13 PROCEDURE — 1111F DSCHRG MED/CURRENT MED MERGE: CPT | Performed by: FAMILY MEDICINE

## 2021-09-13 PROCEDURE — 99215 OFFICE O/P EST HI 40 MIN: CPT | Performed by: FAMILY MEDICINE

## 2021-09-13 RX ORDER — ZINC GLUCONATE 50 MG
50 TABLET ORAL DAILY
COMMUNITY

## 2021-09-13 NOTE — PROGRESS NOTES
Post-Discharge Transitional Care Management Services or Hospital Follow Up      Doreen Barraza   YOB: 1932    Date of Office Visit:  9/13/2021  Date of Hospital Admission: 8/18/21  Date of Hospital Discharge: 8/26/21  Readmission Risk Score(high >=14%.  Medium >=10%):Readmission Risk Score: 18      Care management risk score Rising risk (score 2-5) and Complex Care (Scores >=6): 1     Non face to face  following discharge, date last encounter closed (first attempt may have been earlier): 8/27/2021  3:57 PM 8/27/2021  3:57 PM    Call initiated 2 business days of discharge: Yes     Patient Active Problem List   Diagnosis    Degeneration of intervertebral disc of lumbosacral region    Age related osteoporosis    Right knee DJD    Hyperlipidemia    HTN (hypertension)    Macular degeneration    Chronic obstructive pulmonary disease ([address]    Legal blindness    Pseudophakia    Scotoma involving central area, bilateral    Thoracic or lumbosacral neuritis or radiculitis, unspecified    Enthesopathy of hip region    Pulmonary disorder    COVID-19    Pneumonia due to COVID-19 virus    Hypoxia    Thyroid nodule    Hypomagnesemia       Allergies   Allergen Reactions    Bisphosphonates Swelling    Sulfa Antibiotics Swelling    Lisinopril Other (See Comments)     ACE inhibitors make her cough       Medications listed as ordered at the time of discharge from hospital   Lety Harrison, [address] Medication Instructions NAOMI:    Printed on:09/13/21 1052   Medication Information                      amLODIPine (NORVASC) 5 MG tablet  Take 5 mg by mouth daily At night             Ascorbic Acid  MG CPCR  Take 1,000 mg by mouth daily             aspirin 81 MG EC tablet  Take 1 tablet by mouth 2 times daily             budesonide-formoterol (SYMBICORT) 160-4.5 MCG/ACT AERO  Inhale 2 puffs into the lungs 2 times daily             Coenzyme Q10 (CO Q 10 PO)  Take by mouth             Magnesium 400 MG CAPS  Take by mouth 2 times daily              Melatonin 1 MG CHEW  Take 3 mg by mouth At bedtime as needed             metoprolol tartrate (LOPRESSOR) 25 MG tablet  TAKE ONE TABLET BY MOUTH TWO TIMES A DAY             Multiple Vitamins-Minerals (PRESERVISION AREDS 2+MULTI VIT PO)  Take by mouth             Omega-3 Fatty Acids (FISH OIL PO)  Take by mouth             OXYGEN  Please supply patient with a portable oxygen concentrator             potassium chloride (KLOR-CON M20) 20 MEQ extended release tablet  20 mEq daily              pravastatin (PRAVACHOL) 40 MG tablet               Probiotic Product (PROBIOTIC & ACIDOPHILUS EX ST) CAPS  Take by mouth             vitamin D (CHOLECALCIFEROL) 1000 UNIT TABS tablet  Take 1,000 Units by mouth daily             zinc 50 MG TABS tablet  Take 50 mg by mouth daily                   Medications marked \"taking\" at this time  Outpatient Medications Marked as Taking for the 9/13/21 encounter (Office Visit) with Rossy Calvert MD   Medication Sig Dispense Refill    zinc 50 MG TABS tablet Take 50 mg by mouth daily      Ascorbic Acid  MG CPCR Take 1,000 mg by mouth daily      Melatonin 1 MG CHEW Take 3 mg by mouth At bedtime as needed      metoprolol tartrate (LOPRESSOR) 25 MG tablet TAKE ONE TABLET BY MOUTH TWO TIMES A DAY      budesonide-formoterol (SYMBICORT) 160-4.5 MCG/ACT AERO Inhale 2 puffs into the lungs 2 times daily 3 Inhaler 1    potassium chloride (KLOR-CON M20) 20 MEQ extended release tablet 20 mEq daily       Coenzyme Q10 (CO Q 10 PO) Take by mouth      OXYGEN Please supply patient with a portable oxygen concentrator 1 Units 0    aspirin 81 MG EC tablet Take 1 tablet by mouth 2 times daily (Patient taking differently: Take 81 mg by mouth daily ) 60 tablet 0    Magnesium 400 MG CAPS Take by mouth 2 times daily       Probiotic Product (PROBIOTIC & ACIDOPHILUS EX ST) CAPS Take by mouth      Multiple Vitamins-Minerals (PRESERVISION AREDS 2+MULTI VIT PO) Take by mouth      amLODIPine (NORVASC) 5 MG tablet Take 5 mg by mouth daily At night      pravastatin (PRAVACHOL) 40 MG tablet       vitamin D (CHOLECALCIFEROL) 1000 UNIT TABS tablet Take 1,000 Units by mouth daily      Omega-3 Fatty Acids (FISH OIL PO) Take by mouth          Medications patient taking as of now reconciled against medications ordered at time of hospital discharge: Yes    Chief Complaint   Patient presents with    Follow-Up from Hospital     d/c 9/2 from [facility]; pt is freezing; has after effects from covid (is congested)     Discuss Medications       HPI    Inpatient course: Discharge summary reviewed- see chart. See D/C summary from [facility] below:    DISCHARGE SUMMARY     PATIENT NAME: [address] [place] [address] [place]: 8/31/2021   MRN: [id number] DISCHARGE DATE: 9/2/2021     ATTENDING PHYSICIAN: Jennifer Hernandez MD Code Status: Not on file    Highest Readmission Risk Score: 34   The 30 day readmissions risk score is derived from an internally validated risk model which evaluates patient level characteristics, utilization history, medication orders and lab results up until the day of discharge. Patients with a score of 40 or above are considered highest risk for readmission. Specific patient level drivers will be listed at the bottom of the summary. CONSULTING TEAMS DURING HOSPITALIZATION: Geriatrics, Infectious Disease, Pulmonology   Treatment Team:   Attending Provider: Jennifer Hernandez MD  Nurse Practitioner: OLIMPIA Keith CNP  REASON FOR HOSPITALIZATION: Dehydration, diarrhea, hypoxia    DIAGNOSIS:  Active Problems:  Degeneration of lumbar or lumbosacral intervertebral disc POA: Yes  Legal blindness POA: Yes  Macular degeneration POA: Yes  Hypertension POA: Yes  Hyperlipidemia POA: Yes  Thyroid nodule POA: Yes  Diarrhea POA: Yes  Pulmonary hypertension ([address] POA: Yes  Malnutrition of moderate degree ([address] POA: Yes  Counseling regarding advance care planning and goals of care POA: Yes  Metabolic encephalopathy POA: Yes  Resolved Problems:  COVID-19 POA: Yes  Acute respiratory failure with hypoxia (HCC) POA: Yes  Dehydration POA: Yes  Altered mental status POA: Unknown  At risk for delirium POA: Yes        OPERATIONS DURING HOSPITALIZATION: None    PROCEDURES DURING HOSPITALIZATION: CT Chest, CXR    HOSPITAL COURSE:   This is a 80year old female with a PMHx of hypertension, pulmonary hypertension, macular degeneration, former smoker, DM (A1C 6.1 on 3/27/21-not on meds), emphysema normally on home O2 2L NC at night who was recently dx with COVID on 8/13. Patient began feeling weak with diarrhea, dry cough on 8/4/21 after exposure to COVID at a wedding and sought treatment on 8/13 in the ED. She was given 2L fluid and IV decadron then sent to an outpatient center for monoclonal antibody infusion the same day and returned home. Patient continued to worsen and was hospitalized at [facility] from 8/18/-8/26 and was treated with Decadron and Remdesivir. She also received Keflex for ?UTI. She presents to the Emergency department today with shortness of breath, general weakness and confusion. Denied new or worsening cough.           ED Course: Arrived with sat 88% RA, BP stable, HR 90's, afebrile. Labs show mild hyponatremia, stable sCr, hyperglycemia (240), leukocytosis (21.34) and normal proBNP. Ddimer elevated at 1200. . Lactate 3.17-->2.90, s/p 1L NS. CT PE completed - neg for PE. CXR with bilateral opacities. UA negative. Pt given Vanc/Zosyn and UC/BC's sent. Patients work up did not reveal any new infections. COVID has resolved. She was seen by Infectious Disease, Pulmonology and Geriatrics. She was treated for dehydration- likely cause of lab abnormalities with IV fluids. Labs have normalized. She was also treated for multifactorial delirium with Seroquel. She will Seroquel at home over the next week as needed. Patient seen by physical therapy with recommendations for home therapy.  [facility] arranged [facility]. Medically stable for home. Continue home oxygen at 4L NC.         Active Problems:       Respiratory failure with hypoxia (HCC) POA:  COVID-19 POA: Yes   Emphysema  Pulmonary HTN  Dehydration  Leukocytosis  -Patient symptomatic on 8/9 and seen in ED on 8/13  -CT PE on 8/18 at OSH without PE but did show mild to moderate emphysematous disease  -Patient was admitted from 8/18 to 8/26 at [facility] for Resp Failure/COVID and completed a course of Remdesivir and Decadron. She required hi-flow during her stay but was weaned to 4L NC on 8/26 and returned home. Patient was discharged with decadron 6mg and keflex 250mg TID from 8/26->8/31.   -CT PE on admission 8/31: w/o PE  -CXR: There are patchy subpleural airspace opacities bilaterally (right greater than left). -Lactate 3.17-->2.80-->2.9-->3.4-->1.3, hds , suspect elevation r/t dehydration, diarrhea  -Leukocytosis 21.34, proBNP normal, ddimer 1200  -procal 0.10  -s/p 1L fluids in ED, and repeat fluids x 2L on floor. Pt is hemodynamically stable  -new infections ruled out  -Maintaining sat's >95% on 4L NC   PLAN:   Infectious Disease Consult  Pulmonology consult  Follow final BC's (NG x 48hrs)  Incentive spirometer  Albuterol PRN  Continue home oxygen  PT-home PT - F2F on chart     Encephalopathy  -periods of confusion observed. had haldol x 1   -likely multifactorial - hospitalization, steroid use, infection, hypoxia, dehydration  -s/p fluids, new infection ruled out  PLAN:   Delirium protocol  Fall precautions  S/p fluids  Geriatrics consult--. Seroquel once daily PRN x 7 days. agitation     Diarrhea - resolved  -patient was having diarrhea at home.  Denied any on the day of admission  -was recently in hospital, covid and received abx (keflex)  -denies diarrhea   PLAN:   Cdiff negative     Hypertension   BP stable  C/w BB     Hyperlipidemia  -c.w statin     Degeneration of lumbar or lumbosacral intervertebral disc POA: Yes  -tylenol PRN     Legal blindness POA: Yes  Macular degeneration POA: Yes  -follow up with Ophthal       Incidental Thyroid Nodule  -noted on CT 8/31: 1.3 cm left thyroid nodule   -F/U imaging as arranged by PCP         Interval history/Current status: IS feeling better in general. Has been weaning O2. Last two days is taking longer for O2 to recover. Wonders about secondary infection. Delirium has resolved and she is no longer on Seroquel. Review of Systems    Vitals:    09/13/21 1014   BP: 122/70   Pulse: 85   Resp: 17   Temp: 96.4 °F (35.8 °C)   TempSrc: Temporal   SpO2: 94%   Weight: 136 lb 12.8 oz (62.1 kg)   Height: 5' 3\" (1.6 m)     Body mass index is 24.23 kg/m². Wt Readings from Last 3 Encounters:   09/13/21 136 lb 12.8 oz (62.1 kg)   08/31/21 135 lb (61.2 kg)   08/19/21 135 lb (61.2 kg)     BP Readings from Last 3 Encounters:   09/13/21 122/70   08/26/21 (!) 147/92   08/13/21 112/64       Physical Exam  Constitutional:       Appearance: She is well-developed. HENT:      Head: Normocephalic. Eyes:      Conjunctiva/sclera: Conjunctivae normal.   Pulmonary:      Effort: Pulmonary effort is normal.   Neurological:      Mental Status: She is alert and oriented to person, place, and time. Psychiatric:         Behavior: Behavior normal.         Thought Content: Thought content normal.         Judgment: Judgment normal.       In W/C with NC O2. Bibasilar and RML crackles, no resp distress      Assessment/Plan:  Elisa Pop was seen today for follow-up from hospital and discuss medications. Diagnoses and all orders for this visit:    Pneumonia due to COVID-19 virus  Comments:  with abnl lung exam ? COVID residual v. PNA v. CHF, CXR ordered. Labs pending. Orders:  -     CO DISCHARGE MEDS RECONCILED W/ CURRENT OUTPATIENT MED LIST  -     CBC With Auto Differential; Future  -     XR CHEST STANDARD (2 VW);  Future  -     Cancel: CBC With Auto Differential; Future    Acute delirium  Comments:  rESOLVED    Hyponatremia  Comments:  resolved; stay off chlorthalidone    Thyroid nodule  -     US HEAD NECK SOFT TISSUE THYROID; Future  -     TSH Without Reflex; Future    Abnormal laboratory test result  -     Cardiolipin Antibody, IgM; Future  -     Hemoglobin A1C; Future    Abnormal finding of blood chemistry, unspecified   -     Hemoglobin A1C; Future    Hypomagnesemia  -     Magnesium; Future    Chronic edema  Comments:  no diuretic given recent episodes of dehydration, normal BP and venous insuff etiology. Elevate legs and wear compression hose. Medical Decision Making: high complexity    Time spent 43 minutes.

## 2021-09-14 NOTE — RESULT ENCOUNTER NOTE
Your xray results show no sign of bacterial pneumonia. The changes mentioned by the radiologist (fibrosis) can be associated with COVID infection. They appear to be mild to moderate and may resolve over time. There is no medical intervention for this issue.

## 2021-09-22 ENCOUNTER — TELEPHONE (OUTPATIENT)
Dept: FAMILY MEDICINE CLINIC | Age: 86
End: 2021-09-22

## 2021-09-22 DIAGNOSIS — R60.1 GENERALIZED EDEMA: Primary | ICD-10-CM

## 2021-09-22 DIAGNOSIS — I50.41 ACUTE COMBINED SYSTOLIC AND DIASTOLIC HEART FAILURE (HCC): ICD-10-CM

## 2021-09-22 RX ORDER — FUROSEMIDE 20 MG/1
20 TABLET ORAL DAILY
Qty: 5 TABLET | Refills: 0 | Status: SHIPPED | OUTPATIENT
Start: 2021-09-22 | End: 2021-10-13 | Stop reason: DRUGHIGH

## 2021-09-22 NOTE — TELEPHONE ENCOUNTER
I am sorry that Mrs. Eunice Parish is experiencing this problem. It is a potentially complicated situation and she needs to be seen in the office by me as soon as possible. In the meantime, I will prescribe furosemide 20 mg p.o. every morning for 5 days. Also, I would like her nurse to please draw labs which I have ordered. If she is able to get here for an appointment tomorrow, these labs can be drawn in the medical office building instead of by the nurse.

## 2021-09-30 ENCOUNTER — TELEPHONE (OUTPATIENT)
Dept: FAMILY MEDICINE CLINIC | Age: 86
End: 2021-09-30

## 2021-09-30 NOTE — TELEPHONE ENCOUNTER
BRIEF DISCHARGE NOTE:    Reason for hospitalization -  Cataract surgery     Final Diagnosis - Visually significant Cataract    Procedures and treatment provided - Status post phacoemulsification with placement of intraocular lens     Diet - Advance to regular as tolerated    Activity - as tolerated    Disposition at the end of the case - Good.    Discharge: to home    The patient tolerated the procedure well and knows to follow up with me tomorrow morning in the eye clinic, sooner if needed.    Patient and family instructions (as appropriate) - Given to patient on discharge    Keysha Valle MD     Shantell Abernathy, calling from [facility] is calling to verify if patient has been diagnosed with diabetes? Please advise and thank you.        Shantell Abernathy ph# [phone], ext [phone]

## 2021-10-06 NOTE — TELEPHONE ENCOUNTER
Radames Rene called back from [facility] to verify and see if patient has been diagnosed with Diabetes type 2?     Please advise and thank you

## 2021-10-13 ENCOUNTER — OFFICE VISIT (OUTPATIENT)
Dept: FAMILY MEDICINE CLINIC | Age: 86
End: 2021-10-13
Payer: MEDICARE

## 2021-10-13 VITALS
OXYGEN SATURATION: 97 % | HEART RATE: 78 BPM | WEIGHT: 132 LBS | SYSTOLIC BLOOD PRESSURE: 144 MMHG | RESPIRATION RATE: 13 BRPM | DIASTOLIC BLOOD PRESSURE: 70 MMHG | TEMPERATURE: 96.2 F | BODY MASS INDEX: 23.39 KG/M2 | HEIGHT: 63 IN

## 2021-10-13 DIAGNOSIS — J96.21 ACUTE ON CHRONIC RESPIRATORY FAILURE WITH HYPOXIA AND HYPERCAPNIA (HCC): Primary | ICD-10-CM

## 2021-10-13 DIAGNOSIS — I10 PRIMARY HYPERTENSION: ICD-10-CM

## 2021-10-13 DIAGNOSIS — J42 CHRONIC BRONCHITIS, UNSPECIFIED CHRONIC BRONCHITIS TYPE (HCC): ICD-10-CM

## 2021-10-13 DIAGNOSIS — J96.22 ACUTE ON CHRONIC RESPIRATORY FAILURE WITH HYPOXIA AND HYPERCAPNIA (HCC): Primary | ICD-10-CM

## 2021-10-13 DIAGNOSIS — I50.33 ACUTE ON CHRONIC HEART FAILURE WITH PRESERVED EJECTION FRACTION (HCC): ICD-10-CM

## 2021-10-13 DIAGNOSIS — E83.42 HYPOMAGNESEMIA: ICD-10-CM

## 2021-10-13 DIAGNOSIS — U09.9 POST-COVID SYNDROME: ICD-10-CM

## 2021-10-13 PROCEDURE — 99214 OFFICE O/P EST MOD 30 MIN: CPT | Performed by: FAMILY MEDICINE

## 2021-10-13 PROCEDURE — 1111F DSCHRG MED/CURRENT MED MERGE: CPT | Performed by: FAMILY MEDICINE

## 2021-10-13 RX ORDER — ALBUTEROL SULFATE 2.5 MG/3ML
2.5 SOLUTION RESPIRATORY (INHALATION) 3 TIMES DAILY
COMMUNITY
Start: 2021-10-05 | End: 2021-11-04

## 2021-10-13 RX ORDER — MAGNESIUM OXIDE 400 MG/1
400 TABLET ORAL 2 TIMES DAILY
COMMUNITY

## 2021-10-13 RX ORDER — TRIAMCINOLONE ACETONIDE 0.25 MG/G
OINTMENT TOPICAL 2 TIMES DAILY
COMMUNITY
Start: 2021-10-05 | End: 2021-10-19

## 2021-10-13 RX ORDER — FLUTICASONE FUROATE AND VILANTEROL 100; 25 UG/1; UG/1
POWDER RESPIRATORY (INHALATION) DAILY
COMMUNITY
Start: 2021-10-06 | End: 2021-11-05

## 2021-10-13 RX ORDER — FUROSEMIDE 40 MG/1
40 TABLET ORAL 2 TIMES DAILY
COMMUNITY
End: 2021-11-17

## 2021-10-13 RX ORDER — FUROSEMIDE 40 MG/1
40 TABLET ORAL DAILY
COMMUNITY
Start: 2021-10-06 | End: 2021-11-17

## 2021-10-13 NOTE — PROGRESS NOTES
plac Post-Discharge Transitional Care Management Services or Hospital Follow Up      Catherine Sweeney   YOB: 1932    Date of Office Visit:  10/13/2021  Date of Hospital Admission: 8/18/21  Date of Hospital Discharge: 8/26/21  Readmission Risk Score(high >=14%.  Medium >=10%):Readmission Risk Score: 18      Care management risk score Rising risk (score 2-5) and Complex Care (Scores >=6): 1     Non face to face  following discharge, date last encounter closed (first attempt may have been earlier): *No documented post hospital discharge outreach found in the last 14 days *No documented post hospital discharge outreach found in the last 14 days    Call initiated 2 business days of discharge: *No response recorded in the last 14 days     Patient Active Problem List   Diagnosis    Degeneration of intervertebral disc of lumbosacral region    Age related osteoporosis    Right knee DJD    Hyperlipidemia    HTN (hypertension)    Macular degeneration    Chronic obstructive pulmonary disease ([facility] [address]    Legal blindness    Pseudophakia    Scotoma involving central area, bilateral    Thoracic or lumbosacral neuritis or radiculitis, unspecified    Enthesopathy of hip region    Pulmonary disorder    COVID-19    Pneumonia due to COVID-19 virus    Hypoxia    Thyroid nodule    Hypomagnesemia       Allergies   Allergen Reactions    Bisphosphonates Swelling    Sulfa Antibiotics Swelling    Lisinopril Other (See Comments)     ACE inhibitors make her cough       Medications listed as ordered at the time of discharge from hospital   [facility], [address] Medication Instructions NAOMI:    Printed on:10/13/21 1013   Medication Information                      albuterol (PROVENTIL) (2.5 MG/3ML) 0.083% nebulizer solution  Inhale 2.5 mg into the lungs 3 times daily             Ascorbic Acid  MG CPCR  Take 1,000 mg by mouth daily             aspirin 81 MG EC tablet  Take 1 tablet by mouth 2 times daily Coenzyme Q10 (CO Q 10 PO)  Take by mouth             fluticasone-vilanterol (BREO ELLIPTA) 100-25 MCG/INH AEPB inhaler  Inhale into the lungs daily             furosemide (LASIX) 40 MG tablet  Take 40 mg by mouth daily             furosemide (LASIX) 40 MG tablet  Take 40 mg by mouth 2 times daily             Magnesium 400 MG CAPS  Take by mouth 2 times daily              magnesium oxide (MAG-OX) 400 MG tablet  Take 400 mg by mouth 2 times daily             Melatonin 1 MG CHEW  Take 3 mg by mouth At bedtime as needed             metoprolol tartrate (LOPRESSOR) 25 MG tablet  Take 1.5 tablets by mouth 2 times daily             Multiple Vitamins-Minerals (PRESERVISION AREDS 2+MULTI VIT PO)  Take by mouth             Omega-3 Fatty Acids (FISH OIL PO)  Take by mouth             OXYGEN  Please supply patient with a portable oxygen concentrator             potassium chloride (KLOR-CON M20) 20 MEQ extended release tablet  20 mEq daily              pravastatin (PRAVACHOL) 40 MG tablet               Probiotic Product (PROBIOTIC & ACIDOPHILUS EX ST) CAPS  Take by mouth             triamcinolone (KENALOG) 0.025 % ointment  Apply topically 2 times daily             vitamin D (CHOLECALCIFEROL) 1000 UNIT TABS tablet  Take 1,000 Units by mouth daily             zinc 50 MG TABS tablet  Take 50 mg by mouth daily                   Medications marked \"taking\" at this time  Outpatient Medications Marked as Taking for the 10/13/21 encounter (Office Visit) with Debbie Sousa MD   Medication Sig Dispense Refill    fluticasone-vilanterol (BREO ELLIPTA) 100-25 MCG/INH AEPB inhaler Inhale into the lungs daily      albuterol (PROVENTIL) (2.5 MG/3ML) 0.083% nebulizer solution Inhale 2.5 mg into the lungs 3 times daily      magnesium oxide (MAG-OX) 400 MG tablet Take 400 mg by mouth 2 times daily      triamcinolone (KENALOG) 0.025 % ointment Apply topically 2 times daily      furosemide (LASIX) 40 MG tablet Take 40 mg by mouth daily      furosemide (LASIX) 40 MG tablet Take 40 mg by mouth 2 times daily      metoprolol tartrate (LOPRESSOR) 25 MG tablet Take 1.5 tablets by mouth 2 times daily 1 tablet 0    zinc 50 MG TABS tablet Take 50 mg by mouth daily      Ascorbic Acid  MG CPCR Take 1,000 mg by mouth daily      Melatonin 1 MG CHEW Take 3 mg by mouth At bedtime as needed      potassium chloride (KLOR-CON M20) 20 MEQ extended release tablet 20 mEq daily       Coenzyme Q10 (CO Q 10 PO) Take by mouth      OXYGEN Please supply patient with a portable oxygen concentrator 1 Units 0    aspirin 81 MG EC tablet Take 1 tablet by mouth 2 times daily (Patient taking differently: Take 81 mg by mouth daily ) 60 tablet 0    Magnesium 400 MG CAPS Take by mouth 2 times daily       Probiotic Product (PROBIOTIC & ACIDOPHILUS EX ST) CAPS Take by mouth      Multiple Vitamins-Minerals (PRESERVISION AREDS 2+MULTI VIT PO) Take by mouth      pravastatin (PRAVACHOL) 40 MG tablet       vitamin D (CHOLECALCIFEROL) 1000 UNIT TABS tablet Take 1,000 Units by mouth daily      Omega-3 Fatty Acids (FISH OIL PO) Take by mouth          Medications patient taking as of now reconciled against medications ordered at time of hospital discharge: Yes    Chief Complaint   Patient presents with    Hypertension     1 month f/u     Follow-Up from Hospital     d/c 10/5 [facility]    Inpatient course: Discharge summary reviewed- see chart.  See below for copy of d/c summary from [facility] on 10/05/2021:        DISCHARGE SUMMARY     PATIENT NAME: Sarai Mejia DATE: 9/27/2021   MRN: [id number] DISCHARGE DATE: 10/5/2021     ATTENDING PHYSICIAN: Vidhi Farley MD Code Status: Not on file    Highest Readmission Risk Score: 30   The 30 day readmissions risk score is derived from an internally validated risk model which evaluates patient level characteristics, utilization history, medication orders and lab results up until the day of discharge. Patients with a score of 40 or above are considered highest risk for readmission. Specific patient level drivers will be listed at the bottom of the summary. CONSULTING TEAMS DURING HOSPITALIZATION: Cardiology, Pulmonology  Treatment Team:   Attending Provider: Portia Camara MD  Nurse Practitioner: OLIMPIA Rojas CNP    REASON FOR HOSPITALIZATION:   Acute on Chronic Hypoxic Respiratory Failure    DIAGNOSIS:  Active Problems:  Thyroid nodule POA: Yes  Pulmonary hypertension (HCC) POA: Yes  Coronary artery disease involving native coronary artery of native heart without angina pectoris POA: Yes  Essential hypertension POA: Yes  General weakness POA: Yes  Resolved Problems:  Acute on chronic respiratory failure with hypoxia (HCC) POA: Yes  Fluid overload POA: Yes  Hyponatremia POA: Yes  Hypokalemia POA: Yes  Acute diastolic CHF (congestive heart failure) (HCC) POA: Yes  Right-sided heart failure (HCC) POA: Yes        OPERATIONS DURING HOSPITALIZATION: None    PROCEDURES DURING HOSPITALIZATION: No procedures performed    HOSPITAL COURSE:   PT is an 81 yo F with HTN, CAD, COPD, and recent COVID-19 infection with resultant respiratory failure requiring home 4L O2 via NC who presented with worsening SOB along with edema and weight gain. She was found to have acute on chronic respiratory failure with increased oxygen requirement. She was diagnosed with acute decompensated heart failure with preserved ejection fraction and echocardiogram was consistent with RV failure and pulmonary hypertension. She was diuresed with IV lasix with significant improvement and transitioned to po lasix 40 mg daily at time of discharge. She was also treated with LABA/ICS and scheduled breathing treatments along with aggressive IS, airway clearance, and mobilization. She gradually improved and on day of discharge desat study was performed and patient was having good saturations on her home 4L O2 with both rest and exertion. Patient developed morbiliform rash on her chest and back during this admission which was attributed to recently initiated seroquel and improving with triamcinolone ointment and discontinuation of seroquel. Patient was discharged with home health care arranged. She will follow up with cardiology and will need repeat echocardiogram in 3 months. Patients daughter is scheduling close pulmonology follow up with Dr. Flaca Lomax. Transitions of Care Critical Issues:  Lasix 40 mg daily, albuterol nebs TID, started [facility]  Cardiology and Pulmonology follow up  [address]: No pending results. INCIDENTAL OR ACTIONABLE FINDING (Last Refresh: 10/05/2021 2:10 PM)  Test(s):  CT CHEST W IVCON PE        PATIENT CONDITION AT DISCHARGE: Improved    DISCHARGE DISPOSITION: Home with [facility]      Interval history/Current status: BP is running 100-110/60. Is starting to regain some energy and dyspnea on exertion is improving somewhat. No swelling. No fevers, chills, sweats. Good appetite. No significant constipation. Review of Systems  See above. Vitals:    10/13/21 0937   BP: (!) 144/70   Pulse: 78   Resp: 13   Temp: 96.2 °F (35.7 °C)   TempSrc: Temporal   SpO2: 97%   Weight: 132 lb (59.9 kg)   Height: 5' 3\" (1.6 m)     Body mass index is 23.38 kg/m². Wt Readings from Last 3 Encounters:   10/13/21 132 lb (59.9 kg)   09/13/21 136 lb 12.8 oz (62.1 kg)   08/31/21 135 lb (61.2 kg)     BP Readings from Last 3 Encounters:   10/13/21 (!) 144/70   09/13/21 122/70   08/26/21 (!) 147/92       Physical Exam  Constitutional:       Appearance: She is well-developed. HENT:      Head: Normocephalic. Eyes:      Conjunctiva/sclera: Conjunctivae normal.   Pulmonary:      Effort: Pulmonary effort is normal.   Neurological:      Mental Status: She is alert and oriented to person, place, and time. Psychiatric:         Behavior: Behavior normal.         Thought Content:  Thought content normal. Judgment: Judgment normal.       Lab Results   Component Value Date    WBC 9.4 09/13/2021    HGB 11.4 (L) 09/13/2021    HCT 33.0 (L) 09/13/2021     09/13/2021    HDL 36 (L) 03/27/2018    ALT 23 08/26/2021    AST 19 08/26/2021     (L) 09/13/2021    K 5.1 (H) 09/13/2021    CL 96 09/13/2021    CREATININE 0.62 09/13/2021    BUN 15 09/13/2021    CO2 25 09/13/2021    TSH 2.360 09/13/2021    INR 1.0 08/13/2021    LABA1C 6.1 (H) 03/27/2018     XR CHEST (2 VW)  Narrative: Exam: XR CHEST (2 VW)      CLINICAL HISTORY: U07.1 Pneumonia due to COVID-19 virus ICD10     COMPARISON: Chest x-ray from August 11, 2021 and chest CT from August 18, 2021    CHEST X-ray, 2 VIEWS  FINDINGS:     The cardiomediastinal silhouette is within normal limits. There are no infiltrates, consolidations or effusions. There are diffuse increased pulmonary markings similar to the previous study extending to the periphery worrisome for chronic interstitial disease, early pulmonary fibrosis. Bones of the thorax appear intact. Impression: There are no focal consolidations. There are increased pulmonary markings throughout both lungs similar to previous studies worrisome for pulmonary fibrosis. Assessment/Plan:      Chavo Marroquin was seen today for hypertension and follow-up from hospital.    Diagnoses and all orders for this visit:    Acute on chronic respiratory failure with hypoxia and hypercapnia ([address]  -     Basic Metabolic Panel; Future    Acute on chronic heart failure with preserved ejection fraction (HCC)  -     Handicap Placard MISC; by Does not apply route  -     Handicap Placard MISC; by Does not apply route    Post-COVID syndrome    Primary hypertension  -     metoprolol tartrate (LOPRESSOR) 25 MG tablet; Take 1.5 tablets by mouth 2 times daily    Hypomagnesemia  -     Magnesium;  Future    Other orders  -     OR DISCHARGE MEDS RECONCILED W/ CURRENT OUTPATIENT MED LIST        Medical Decision Making: high complexity

## 2021-10-25 ENCOUNTER — PATIENT MESSAGE (OUTPATIENT)
Dept: FAMILY MEDICINE CLINIC | Age: 86
End: 2021-10-25

## 2021-10-26 DIAGNOSIS — I50.33 ACUTE ON CHRONIC HEART FAILURE WITH PRESERVED EJECTION FRACTION (HCC): ICD-10-CM

## 2021-10-26 NOTE — TELEPHONE ENCOUNTER
From: Doe Lindsay  To: Emely Casiano MD  Sent: 10/25/2021 4:18 PM EDT  Subject: Prescription Question    Dr. Melissa Berkowitz,  I have attached an image of the rejection notice from the [facility] regarding the two prescriptions you had written for handicap tags for my mom, Doe Lindsay. Would you please rewrite the prescriptions according to the [facility]'s needs? Sorry to bother you with this.    Thank you

## 2021-11-15 DIAGNOSIS — E83.42 HYPOMAGNESEMIA: ICD-10-CM

## 2021-11-15 DIAGNOSIS — R60.1 GENERALIZED EDEMA: ICD-10-CM

## 2021-11-15 LAB
ALBUMIN SERPL-MCNC: 4.4 G/DL (ref 3.5–4.6)
ALP BLD-CCNC: 82 U/L (ref 40–130)
ALT SERPL-CCNC: 11 U/L (ref 0–33)
ANION GAP SERPL CALCULATED.3IONS-SCNC: 13 MEQ/L (ref 9–15)
AST SERPL-CCNC: 20 U/L (ref 0–35)
BASOPHILS ABSOLUTE: 0.1 K/UL (ref 0–0.2)
BASOPHILS RELATIVE PERCENT: 1.1 %
BILIRUB SERPL-MCNC: 0.6 MG/DL (ref 0.2–0.7)
BUN BLDV-MCNC: 38 MG/DL (ref 8–23)
CALCIUM SERPL-MCNC: 10.2 MG/DL (ref 8.5–9.9)
CHLORIDE BLD-SCNC: 101 MEQ/L (ref 95–107)
CO2: 27 MEQ/L (ref 20–31)
CREAT SERPL-MCNC: 0.74 MG/DL (ref 0.5–0.9)
EOSINOPHILS ABSOLUTE: 0.3 K/UL (ref 0–0.7)
EOSINOPHILS RELATIVE PERCENT: 3.5 %
GFR AFRICAN AMERICAN: >60
GFR NON-AFRICAN AMERICAN: >60
GLOBULIN: 3.4 G/DL (ref 2.3–3.5)
GLUCOSE BLD-MCNC: 206 MG/DL (ref 70–99)
HCT VFR BLD CALC: 42.4 % (ref 37–47)
HEMOGLOBIN: 14.1 G/DL (ref 12–16)
LYMPHOCYTES ABSOLUTE: 1.6 K/UL (ref 1–4.8)
LYMPHOCYTES RELATIVE PERCENT: 19.3 %
MAGNESIUM: 2.3 MG/DL (ref 1.7–2.4)
MCH RBC QN AUTO: 28.7 PG (ref 27–31.3)
MCHC RBC AUTO-ENTMCNC: 33.2 % (ref 33–37)
MCV RBC AUTO: 86.4 FL (ref 82–100)
MONOCYTES ABSOLUTE: 0.4 K/UL (ref 0.2–0.8)
MONOCYTES RELATIVE PERCENT: 5.5 %
NEUTROPHILS ABSOLUTE: 5.8 K/UL (ref 1.4–6.5)
NEUTROPHILS RELATIVE PERCENT: 70.6 %
PDW BLD-RTO: 14.1 % (ref 11.5–14.5)
PLATELET # BLD: 304 K/UL (ref 130–400)
POTASSIUM SERPL-SCNC: 3.7 MEQ/L (ref 3.4–4.9)
RBC # BLD: 4.91 M/UL (ref 4.2–5.4)
SODIUM BLD-SCNC: 141 MEQ/L (ref 135–144)
TOTAL PROTEIN: 7.8 G/DL (ref 6.3–8)
WBC # BLD: 8.2 K/UL (ref 4.8–10.8)

## 2021-11-17 ENCOUNTER — VIRTUAL VISIT (OUTPATIENT)
Dept: FAMILY MEDICINE CLINIC | Age: 86
End: 2021-11-17
Payer: MEDICARE

## 2021-11-17 DIAGNOSIS — J42 CHRONIC BRONCHITIS, UNSPECIFIED CHRONIC BRONCHITIS TYPE (HCC): ICD-10-CM

## 2021-11-17 DIAGNOSIS — I50.41 ACUTE COMBINED SYSTOLIC AND DIASTOLIC HEART FAILURE (HCC): ICD-10-CM

## 2021-11-17 DIAGNOSIS — R09.02 HYPOXIA: Primary | ICD-10-CM

## 2021-11-17 DIAGNOSIS — R73.03 PREDIABETES: ICD-10-CM

## 2021-11-17 DIAGNOSIS — I10 PRIMARY HYPERTENSION: ICD-10-CM

## 2021-11-17 DIAGNOSIS — E83.42 HYPOMAGNESEMIA: ICD-10-CM

## 2021-11-17 PROCEDURE — 99214 OFFICE O/P EST MOD 30 MIN: CPT | Performed by: FAMILY MEDICINE

## 2021-11-17 NOTE — PROGRESS NOTES
2021    TELEHEALTH EVALUATION -- Audio/Visual (During USOVZ-21 public health emergency)    HPI:    Yusuf Gore (:  1932) has requested an audio/video evaluation for the following concern(s):    Hypertension (1 month follow up )    Blood pressure had been running high. Metoprolol was increased to 37.5 mg twice a day at last visit. However, her cardiologist told her to hold off with that change. Has ECHO coming up. Hypoxia - 94% at rest; low 80s with mild exertion with rapid improvement at rest; on O2 NC 3.5 L. Patient labs: Glucose 206, CBC normal, magnesium normal.    Hyperglycemia - previously prediabetic on A1C in 2018 - A1C 6.1. Daughter wonders berberine. Review of Systems    Prior to Visit Medications    Medication Sig Taking?  Authorizing Provider   Handicap Placard MISC by Does not apply route NO EXPIRATION, DISABILITY IS PERMANENT Yes Amelia Srivastava MD   magnesium oxide (MAG-OX) 400 MG tablet Take 400 mg by mouth 2 times daily Yes Historical Provider, MD   furosemide (LASIX) 40 MG tablet Take 40 mg by mouth daily Yes Historical Provider, MD   metoprolol tartrate (LOPRESSOR) 25 MG tablet Take 1.5 tablets by mouth 2 times daily Yes Amelia Srivastava MD   zinc 50 MG TABS tablet Take 50 mg by mouth daily Yes Historical Provider, MD   Ascorbic Acid  MG CPCR Take 1,000 mg by mouth daily Yes Historical Provider, MD   Melatonin 1 MG CHEW Take 3 mg by mouth At bedtime as needed Yes Historical Provider, MD   potassium chloride (KLOR-CON M20) 20 MEQ extended release tablet 20 mEq daily  Yes Historical Provider, MD   Coenzyme Q10 (CO Q 10 PO) Take by mouth Yes Historical Provider, MD   OXYGEN Please supply patient with a portable oxygen concentrator Yes Sandra Blanco PA-C   aspirin 81 MG EC tablet Take 1 tablet by mouth 2 times daily  Patient taking differently: Take 81 mg by mouth daily  Yes NORI Elizondo - CNP   Magnesium 400 MG CAPS Take by mouth 2 times daily  Yes Historical Provider, MD   Probiotic Product (PROBIOTIC & ACIDOPHILUS EX ST) CAPS Take by mouth Yes Historical Provider, MD   Multiple Vitamins-Minerals (PRESERVISION AREDS 2+MULTI VIT PO) Take by mouth Yes Historical Provider, MD   pravastatin (PRAVACHOL) 40 MG tablet  Yes Historical Provider, MD   vitamin D (CHOLECALCIFEROL) 1000 UNIT TABS tablet Take 1,000 Units by mouth daily Yes Historical Provider, MD   Omega-3 Fatty Acids (FISH OIL PO) Take by mouth Yes Historical Provider, MD       Social History     Tobacco Use    Smoking status: Former Smoker     Quit date: 1986     Years since quittin.9    Smokeless tobacco: Never Used   Substance Use Topics    Alcohol use: No    Drug use: No            PHYSICAL EXAMINATION:  [ INSTRUCTIONS:  \"[x]\" Indicates a positive item  \"[]\" Indicates a negative item  -- DELETE ALL ITEMS NOT EXAMINED]  Vital Signs: (As obtained by patient/caregiver or practitioner observation)  Patient appears to be alert and oriented to person, place, time, situation and is in mild resp distress having just walked into kitchen  Mood appears stable and speech and thought are grossly normal.    ASSESSMENT/PLAN:  Kirill Linares was seen today for hypertension. Diagnoses and all orders for this visit:    Hypoxia  Comments:  Stable, fair control on O2 followed by pulmonology. On O2 3.5 L NC. Chronic bronchitis, unspecified chronic bronchitis type ([address]  Comments:  Stable, fair control on O2 followed by pulmonology. On O2 3.5 L NC. Acute combined systolic and diastolic heart failure (HCC)   Comments:  Stable, fair control on O2 followed by cardiology; ECHO pending    Primary hypertension  Comments:  Stable and well controlled on current meds including metprolol 25 mg BID. Prediabetes  Comments:  Reassess A1C.  Advised deferral of berbarine herb until levels are determined  Orders:  -     Hemoglobin A1C; Future    Hypomagnesemia  Comments:  stable and well-controlled        Reviewed with the patient: all disease processes, current clinical status, medications, activities and diet.      Side effects, adverse effects of the medication prescribed today, as well as treatment plan/ rationale and result expectations have been discussed with the patient who expresses understanding and desires to proceed.     Close follow up to evaluate treatment results and for coordination of care. I have reviewed the patient's medical history in detail and updated the computerized patient record. More than 50% of the appointment was spent in face-to-face counseling, education and care coordination. Return in about 3 months (around 2/17/2022) for hypoxia, preDM, HTN - DOXY. Shannon Britt, was evaluated through a synchronous (real-time) audio-video encounter. The patient (or guardian if applicable) is aware that this is a billable service. Verbal consent to proceed has been obtained within the past 12 months. The visit was conducted pursuant to the emergency declaration under the [address] [place] [address], [address] [place] [address] authority and the [employer] General Act. Patient identification was verified, and a caregiver was present when appropriate. The patient was located in a state where the provider was credentialed to provide care. Total time spent on this encounter: Not billed by time    --Katty Steinberg MD on 11/17/2021 at 12:44 PM    An electronic signature was used to authenticate this note.

## 2022-02-17 ENCOUNTER — CARE COORDINATION (OUTPATIENT)
Dept: CARE COORDINATION | Age: 87
End: 2022-02-17

## 2022-02-17 NOTE — CARE COORDINATION
ACM spoke to patient's daughter. ACM was advised that patient is no longer using [facility] for primary care services. ACM updated the chart.

## 2022-03-16 ENCOUNTER — HOSPITAL ENCOUNTER (OUTPATIENT)
Dept: PULMONOLOGY | Age: 87
Setting detail: THERAPIES SERIES
Discharge: HOME | End: 2022-03-16
Payer: MEDICARE

## 2022-03-16 PROCEDURE — G0237 THERAPEUTIC PROCD STRG ENDUR: HCPCS

## 2022-03-22 ENCOUNTER — HOSPITAL ENCOUNTER (OUTPATIENT)
Dept: PULMONOLOGY | Age: 87
Setting detail: THERAPIES SERIES
Discharge: HOME | End: 2022-03-22
Payer: MEDICARE

## 2022-03-22 PROCEDURE — G0237 THERAPEUTIC PROCD STRG ENDUR: HCPCS

## 2022-03-24 ENCOUNTER — HOSPITAL ENCOUNTER (OUTPATIENT)
Dept: PULMONOLOGY | Age: 87
Setting detail: THERAPIES SERIES
Discharge: HOME | End: 2022-03-24
Payer: MEDICARE

## 2022-03-24 PROCEDURE — G0237 THERAPEUTIC PROCD STRG ENDUR: HCPCS

## 2022-03-29 ENCOUNTER — HOSPITAL ENCOUNTER (OUTPATIENT)
Dept: PULMONOLOGY | Age: 87
Setting detail: THERAPIES SERIES
Discharge: HOME | End: 2022-03-29
Payer: MEDICARE

## 2022-03-29 PROCEDURE — G0237 THERAPEUTIC PROCD STRG ENDUR: HCPCS

## 2022-03-30 ENCOUNTER — TELEPHONE (OUTPATIENT)
Dept: FAMILY MEDICINE CLINIC | Age: 87
End: 2022-03-30

## 2022-03-31 ENCOUNTER — HOSPITAL ENCOUNTER (OUTPATIENT)
Dept: PULMONOLOGY | Age: 87
Setting detail: THERAPIES SERIES
Discharge: HOME | End: 2022-03-31
Payer: MEDICARE

## 2022-03-31 PROCEDURE — G0237 THERAPEUTIC PROCD STRG ENDUR: HCPCS

## 2022-04-05 ENCOUNTER — HOSPITAL ENCOUNTER (OUTPATIENT)
Dept: PULMONOLOGY | Age: 87
Setting detail: THERAPIES SERIES
Discharge: HOME | End: 2022-04-05
Payer: MEDICARE

## 2022-04-05 PROCEDURE — G0237 THERAPEUTIC PROCD STRG ENDUR: HCPCS

## 2022-04-07 ENCOUNTER — HOSPITAL ENCOUNTER (OUTPATIENT)
Dept: PULMONOLOGY | Age: 87
Setting detail: THERAPIES SERIES
Discharge: HOME | End: 2022-04-07
Payer: MEDICARE

## 2022-04-07 PROCEDURE — G0237 THERAPEUTIC PROCD STRG ENDUR: HCPCS

## 2022-04-12 ENCOUNTER — HOSPITAL ENCOUNTER (OUTPATIENT)
Dept: PULMONOLOGY | Age: 87
Setting detail: THERAPIES SERIES
Discharge: HOME | End: 2022-04-12
Payer: MEDICARE

## 2022-04-12 PROCEDURE — G0237 THERAPEUTIC PROCD STRG ENDUR: HCPCS

## 2022-04-14 ENCOUNTER — HOSPITAL ENCOUNTER (OUTPATIENT)
Dept: PULMONOLOGY | Age: 87
Setting detail: THERAPIES SERIES
Discharge: HOME | End: 2022-04-14
Payer: MEDICARE

## 2022-04-14 PROCEDURE — G0237 THERAPEUTIC PROCD STRG ENDUR: HCPCS

## 2022-04-19 ENCOUNTER — HOSPITAL ENCOUNTER (OUTPATIENT)
Dept: PULMONOLOGY | Age: 87
Setting detail: THERAPIES SERIES
Discharge: HOME | End: 2022-04-19
Payer: MEDICARE

## 2022-04-19 PROCEDURE — G0237 THERAPEUTIC PROCD STRG ENDUR: HCPCS

## 2022-04-21 ENCOUNTER — HOSPITAL ENCOUNTER (OUTPATIENT)
Dept: PULMONOLOGY | Age: 87
Setting detail: THERAPIES SERIES
Discharge: HOME | End: 2022-04-21
Payer: MEDICARE

## 2022-04-21 PROCEDURE — G0237 THERAPEUTIC PROCD STRG ENDUR: HCPCS

## 2022-04-26 ENCOUNTER — HOSPITAL ENCOUNTER (OUTPATIENT)
Dept: PULMONOLOGY | Age: 87
Setting detail: THERAPIES SERIES
Discharge: HOME | End: 2022-04-26
Payer: MEDICARE

## 2022-04-26 PROCEDURE — G0237 THERAPEUTIC PROCD STRG ENDUR: HCPCS

## 2022-04-28 ENCOUNTER — HOSPITAL ENCOUNTER (OUTPATIENT)
Dept: PULMONOLOGY | Age: 87
Setting detail: THERAPIES SERIES
Discharge: HOME | End: 2022-04-28
Payer: MEDICARE

## 2022-04-28 PROCEDURE — G0237 THERAPEUTIC PROCD STRG ENDUR: HCPCS

## 2022-05-03 ENCOUNTER — HOSPITAL ENCOUNTER (OUTPATIENT)
Dept: PULMONOLOGY | Age: 87
Setting detail: THERAPIES SERIES
Discharge: HOME | End: 2022-05-03
Payer: MEDICARE

## 2022-05-03 PROCEDURE — G0237 THERAPEUTIC PROCD STRG ENDUR: HCPCS

## 2022-05-05 ENCOUNTER — HOSPITAL ENCOUNTER (OUTPATIENT)
Dept: PULMONOLOGY | Age: 87
Setting detail: THERAPIES SERIES
Discharge: HOME | End: 2022-05-05
Payer: MEDICARE

## 2022-05-05 PROCEDURE — G0237 THERAPEUTIC PROCD STRG ENDUR: HCPCS

## 2022-05-05 PROCEDURE — 94625 PHY/QHP OP PULM RHB W/O MNTR: CPT

## 2022-05-10 ENCOUNTER — HOSPITAL ENCOUNTER (OUTPATIENT)
Dept: PULMONOLOGY | Age: 87
Setting detail: THERAPIES SERIES
Discharge: HOME | End: 2022-05-10
Payer: MEDICARE

## 2022-05-10 PROCEDURE — G0237 THERAPEUTIC PROCD STRG ENDUR: HCPCS

## 2022-05-12 ENCOUNTER — HOSPITAL ENCOUNTER (OUTPATIENT)
Dept: PULMONOLOGY | Age: 87
Setting detail: THERAPIES SERIES
Discharge: HOME | End: 2022-05-12
Payer: MEDICARE

## 2022-05-12 PROCEDURE — G0237 THERAPEUTIC PROCD STRG ENDUR: HCPCS

## 2022-05-17 ENCOUNTER — HOSPITAL ENCOUNTER (OUTPATIENT)
Dept: PULMONOLOGY | Age: 87
Setting detail: THERAPIES SERIES
Discharge: HOME | End: 2022-05-17
Payer: MEDICARE

## 2022-05-17 PROCEDURE — G0237 THERAPEUTIC PROCD STRG ENDUR: HCPCS

## 2022-05-19 ENCOUNTER — HOSPITAL ENCOUNTER (OUTPATIENT)
Dept: PULMONOLOGY | Age: 87
Setting detail: THERAPIES SERIES
Discharge: HOME | End: 2022-05-19
Payer: MEDICARE

## 2022-05-19 PROCEDURE — G0237 THERAPEUTIC PROCD STRG ENDUR: HCPCS

## 2022-05-24 ENCOUNTER — HOSPITAL ENCOUNTER (OUTPATIENT)
Dept: PULMONOLOGY | Age: 87
Setting detail: THERAPIES SERIES
Discharge: HOME | End: 2022-05-24
Payer: MEDICARE

## 2022-05-24 PROCEDURE — G0237 THERAPEUTIC PROCD STRG ENDUR: HCPCS

## 2022-05-26 ENCOUNTER — HOSPITAL ENCOUNTER (OUTPATIENT)
Dept: PULMONOLOGY | Age: 87
Setting detail: THERAPIES SERIES
Discharge: HOME | End: 2022-05-26
Payer: MEDICARE

## 2022-05-26 PROCEDURE — G0237 THERAPEUTIC PROCD STRG ENDUR: HCPCS

## 2022-05-31 ENCOUNTER — APPOINTMENT (OUTPATIENT)
Dept: PULMONOLOGY | Age: 87
End: 2022-05-31
Payer: MEDICARE

## 2022-06-02 ENCOUNTER — HOSPITAL ENCOUNTER (OUTPATIENT)
Dept: PULMONOLOGY | Age: 87
Setting detail: THERAPIES SERIES
Discharge: HOME | End: 2022-06-02
Payer: MEDICARE

## 2022-06-02 PROCEDURE — G0237 THERAPEUTIC PROCD STRG ENDUR: HCPCS

## 2022-06-07 ENCOUNTER — HOSPITAL ENCOUNTER (OUTPATIENT)
Dept: PULMONOLOGY | Age: 87
Setting detail: THERAPIES SERIES
Discharge: HOME | End: 2022-06-07
Payer: MEDICARE

## 2022-06-07 PROCEDURE — G0237 THERAPEUTIC PROCD STRG ENDUR: HCPCS

## 2022-06-09 ENCOUNTER — APPOINTMENT (OUTPATIENT)
Dept: PULMONOLOGY | Age: 87
End: 2022-06-09
Payer: MEDICARE

## 2022-06-14 ENCOUNTER — HOSPITAL ENCOUNTER (OUTPATIENT)
Dept: PULMONOLOGY | Age: 87
Setting detail: THERAPIES SERIES
Discharge: HOME | End: 2022-06-14
Payer: MEDICARE

## 2022-06-14 PROCEDURE — G0237 THERAPEUTIC PROCD STRG ENDUR: HCPCS

## 2022-06-16 ENCOUNTER — HOSPITAL ENCOUNTER (OUTPATIENT)
Dept: PULMONOLOGY | Age: 87
Setting detail: THERAPIES SERIES
Discharge: HOME | End: 2022-06-16
Payer: MEDICARE

## 2022-06-16 PROCEDURE — G0237 THERAPEUTIC PROCD STRG ENDUR: HCPCS

## 2022-06-21 ENCOUNTER — APPOINTMENT (OUTPATIENT)
Dept: PULMONOLOGY | Age: 87
End: 2022-06-21
Payer: MEDICARE

## 2022-06-23 ENCOUNTER — HOSPITAL ENCOUNTER (OUTPATIENT)
Dept: PULMONOLOGY | Age: 87
Setting detail: THERAPIES SERIES
Discharge: HOME | End: 2022-06-23
Payer: MEDICARE

## 2022-06-23 PROCEDURE — G0237 THERAPEUTIC PROCD STRG ENDUR: HCPCS

## 2022-06-28 ENCOUNTER — HOSPITAL ENCOUNTER (OUTPATIENT)
Dept: PULMONOLOGY | Age: 87
Setting detail: THERAPIES SERIES
Discharge: HOME | End: 2022-06-28
Payer: MEDICARE

## 2022-06-28 PROCEDURE — G0237 THERAPEUTIC PROCD STRG ENDUR: HCPCS

## 2022-06-30 ENCOUNTER — APPOINTMENT (OUTPATIENT)
Dept: PULMONOLOGY | Age: 87
End: 2022-06-30
Payer: MEDICARE

## 2022-07-05 ENCOUNTER — HOSPITAL ENCOUNTER (OUTPATIENT)
Dept: PULMONOLOGY | Age: 87
Setting detail: THERAPIES SERIES
Discharge: HOME | End: 2022-07-05
Payer: MEDICARE

## 2022-07-05 PROCEDURE — G0237 THERAPEUTIC PROCD STRG ENDUR: HCPCS

## 2022-07-07 ENCOUNTER — HOSPITAL ENCOUNTER (OUTPATIENT)
Dept: PULMONOLOGY | Age: 87
Setting detail: THERAPIES SERIES
Discharge: HOME | End: 2022-07-07
Payer: MEDICARE

## 2022-07-07 PROCEDURE — G0237 THERAPEUTIC PROCD STRG ENDUR: HCPCS

## 2022-07-12 ENCOUNTER — HOSPITAL ENCOUNTER (OUTPATIENT)
Dept: PULMONOLOGY | Age: 87
Setting detail: THERAPIES SERIES
Discharge: HOME | End: 2022-07-12
Payer: MEDICARE

## 2022-07-12 PROCEDURE — G0237 THERAPEUTIC PROCD STRG ENDUR: HCPCS

## 2022-07-14 ENCOUNTER — HOSPITAL ENCOUNTER (OUTPATIENT)
Dept: PULMONOLOGY | Age: 87
Setting detail: THERAPIES SERIES
Discharge: HOME | End: 2022-07-14
Payer: MEDICARE

## 2022-07-14 PROCEDURE — G0237 THERAPEUTIC PROCD STRG ENDUR: HCPCS

## 2022-07-19 ENCOUNTER — HOSPITAL ENCOUNTER (OUTPATIENT)
Dept: PULMONOLOGY | Age: 87
Setting detail: THERAPIES SERIES
Discharge: HOME | End: 2022-07-19
Payer: MEDICARE

## 2022-07-19 PROCEDURE — G0237 THERAPEUTIC PROCD STRG ENDUR: HCPCS

## 2022-07-26 ENCOUNTER — HOSPITAL ENCOUNTER (OUTPATIENT)
Dept: PULMONOLOGY | Age: 87
Setting detail: THERAPIES SERIES
Discharge: HOME | End: 2022-07-26
Payer: MEDICARE

## 2022-07-26 PROCEDURE — G0237 THERAPEUTIC PROCD STRG ENDUR: HCPCS

## 2022-07-28 ENCOUNTER — HOSPITAL ENCOUNTER (OUTPATIENT)
Dept: PULMONOLOGY | Age: 87
Setting detail: THERAPIES SERIES
Discharge: HOME | End: 2022-07-28

## 2022-07-28 PROCEDURE — G0237 THERAPEUTIC PROCD STRG ENDUR: HCPCS

## 2022-08-02 ENCOUNTER — HOSPITAL ENCOUNTER (OUTPATIENT)
Dept: PULMONOLOGY | Age: 87
Setting detail: THERAPIES SERIES
Discharge: HOME | End: 2022-08-02
Payer: MEDICARE

## 2022-08-02 PROCEDURE — G0237 THERAPEUTIC PROCD STRG ENDUR: HCPCS

## (undated) DEVICE — TRAY CATH CATH OD16FR BG F HYDROCOATED

## (undated) DEVICE — TIBURON SPLIT SHEET: Brand: CONVERTORS

## (undated) DEVICE — 400ML COMPACT EVACUATOR KIT, 1/8" PVC WITH TROCAR: Brand: HEMOVAC® WOUND DRAINAGE SYSTEM

## (undated) DEVICE — LABEL MED MED CHPOR

## (undated) DEVICE — SPLINT KNEE UNIV FOR LESS THAN 36IN L20IN FOAM LAM E CNTCT

## (undated) DEVICE — BANDAGE COMPR 396IN LEN 6IN W 1 LAYR CLP CLSR COT E W/ CLP

## (undated) DEVICE — SPONGE DRN W4XL4IN RAYON/POLYESTER 6 PLY NONWOVEN PRECUT

## (undated) DEVICE — MAT FLR SURG QUICKWICK 28X54 IN DISP

## (undated) DEVICE — Z DISCONTINUED PER MEDLINE USE 2741944 DRESSING AQUACEL 12 IN SURG W9XL30CM SIL CVR WTRPRF VIR BACT BARR ANTIMIC

## (undated) DEVICE — STERILE PATIENT PROTECTIVE PAD FOR IMP® KNEE POSITIONERS & COHESIVE WRAP (10 / CASE): Brand: DE MAYO KNEE POSITIONER®

## (undated) DEVICE — SUTURE VCRL SZ 2-0 L36IN ABSRB UD L36MM CT-1 1/2 CIR J945H

## (undated) DEVICE — 2000CC GUARDIAN II: Brand: GUARDIAN

## (undated) DEVICE — ZIMMER® STERILE DISPOSABLE TOURNIQUET CUFF, DUAL PORT, SINGLE BLADDER, 24 IN. (61 CM)

## (undated) DEVICE — Device: Brand: STABLECUT®

## (undated) DEVICE — T4 HOOD

## (undated) DEVICE — STAPLER EXT SKIN 35 WIDE S STL STPL SQUEEZE HNDL VISISTAT

## (undated) DEVICE — 3M™ STERI-DRAPE™ U-DRAPE 1015: Brand: STERI-DRAPE™

## (undated) DEVICE — HIGH FLOW TIP

## (undated) DEVICE — GLOVE ORANGE PI 7 1/2   MSG9075

## (undated) DEVICE — ELECTRODE PT RET AD L9FT HI MOIST COND ADH HYDRGEL CORDED

## (undated) DEVICE — ULTRAMIX BOWL (NEW) KNEE

## (undated) DEVICE — GLOVE ORANGE PI 8   MSG9080

## (undated) DEVICE — UNDERCAST PADDING: Brand: DEROYAL

## (undated) DEVICE — BLADE SAW W125XL70MM THK064MM CUT THK094MM REPL RECIP DBL

## (undated) DEVICE — PACK PROCEDURE SURG TOTAL KNEE PACK

## (undated) DEVICE — SUTURE VCRL SZ 1 L36IN ABSRB UD L36MM CT-1 1/2 CIR J947H

## (undated) DEVICE — EVACUATOR SURG 400CC PVC 3 SPR BLB FOR WND DRNGE

## (undated) DEVICE — TIBURON TOP SHEET: Brand: CONVERTORS

## (undated) DEVICE — CHLORAPREP 26ML ORANGE

## (undated) DEVICE — SIPS DUAL 2 MINUTE TIP

## (undated) DEVICE — REAMER PATELLA SZ 32